# Patient Record
Sex: MALE | Race: WHITE | NOT HISPANIC OR LATINO | Employment: FULL TIME | URBAN - METROPOLITAN AREA
[De-identification: names, ages, dates, MRNs, and addresses within clinical notes are randomized per-mention and may not be internally consistent; named-entity substitution may affect disease eponyms.]

---

## 2017-10-09 ENCOUNTER — ALLSCRIPTS OFFICE VISIT (OUTPATIENT)
Dept: OTHER | Facility: OTHER | Age: 62
End: 2017-10-09

## 2017-10-10 NOTE — PROGRESS NOTES
Assessment  1  Swelling of right eyelid (374 82) (H02 843)    Plan  Swelling of right eyelid    · Erythromycin 5 MG/GM Ophthalmic Ointment; APPLY 1 cm ribbon IN affected EYE  3 TIMES A DAY   · Medrol 4 MG Oral Tablet Therapy Pack (MethylPREDNISolone); Take 6 tablets with  food on day 1, then 5 tablets on day 2, then 4 tablets on day  3 then 3 tablets on day 4 then 2 tablets on day 5 then 1 t    Discussion/Summary    Rto prn  Possible side effects of new medications were reviewed with the patient/guardian today  The treatment plan was reviewed with the patient/guardian  The patient/guardian understands and agrees with the treatment plan      Chief Complaint  patient presenting c/o right eye stye sl/cma      History of Present Illness  HPI: 65 y o m seen for R upper eyelid swelling after insect bite 5 days ago, not better - no vision changes, no therapy tried      Review of Systems    Constitutional: no fever or chills, feels well, no tiredness, no recent weight loss or weight gain  ENT: no complaints of earache, no loss of hearing, no nosebleeds or nasal discharge, no sore throat or hoarseness  Active Problems  1  Atrial fibrillation (427 31) (I48 91)   2  Encounter for CDL (commercial driving license) exam (A37 7) (Z02 4)   3  Encounter for pre-employment health screening examination (V70 5) (Z02 1)    Social History   · Never A Smoker    Current Meds   1  Aspirin 325 MG Oral Tablet; 1 Every Day; Therapy: 82TQG0076 to  Requested for: 33YEK0024 Recorded   2  Verapamil HCl  MG Oral Tablet Extended Release; TAKE 1 TABLET DAILY AS   DIRECTED; Therapy: 76HCM1697 to (Renew:[id number])  Requested for: 13Jun2017; Last   Rx:12Jun2017; Status: ACTIVE - Transmit to [facility] Verification Ordered    The medication list was reviewed and updated today  Allergies  1   No Known Drug Allergies    Vitals   Recorded: 99SWF9923 01:19PM   Temperature 97 4 F   Heart Rate 80   Respiration 16   Systolic 888 Diastolic 78   Height 6 ft    Weight 285 lb    BMI Calculated 38 65   BSA Calculated 2 48     Physical Exam    Constitutional   General appearance: No acute distress, well appearing and well nourished  morbidly obese  Eyes   Conjunctiva and lids: Abnormal   Conjunctiva Findings: normal in both eyes  Eye Lids: right upper eyelid swelling  Pupils and irises: Equal, round and reactive to light  Skin   Skin and subcutaneous tissue: Normal without rashes or lesions           Signatures   Electronically signed by : EVARISTO Cadena ; Oct  9 2017  1:46PM EST                       (Author)

## 2017-11-27 ENCOUNTER — ALLSCRIPTS OFFICE VISIT (OUTPATIENT)
Dept: OTHER | Facility: OTHER | Age: 62
End: 2017-11-27

## 2017-11-28 NOTE — PROGRESS NOTES
Assessment    1  Acute upper respiratory infection (465 9) (J06 9)    Discussion/Summary    Viral URIMucinexprn  The treatment plan was reviewed with the patient/guardian  The patient/guardian understands and agrees with the treatment plan      Chief Complaint  Patient presents for c/o sore throat and cough  nil/lpn      History of Present Illness  HPI: 65 y o m seen for cough, sore throat, hoarseness of voice x 3 days, better today, no fever, no therapy tried      Review of Systems   Constitutional: no fever or chills, feels well, no tiredness, no recent weight loss or weight gain  ENT: sore throat-- and-- hoarseness, but-- no earache-- and-- no nasal discharge  Respiratory: cough, but-- no shortness of breath-- and-- no wheezing  Gastrointestinal: no complaints of abdominal pain, no constipation, no nausea or vomiting, no diarrhea or bloody stools  Active Problems  1  Atrial fibrillation (427 31) (I48 91)   2  Encounter for CDL (commercial driving license) exam (U74 0) (Z02 4)   3  Encounter for pre-employment health screening examination (V70 5) (Z02 1)    Social History     · Never A Smoker    Current Meds   1  Aspirin 325 MG Oral Tablet; 1 Every Day; Therapy: 74DXI8191 to  Requested for: 10OSF3689 Recorded   2  Verapamil HCl  MG Oral Tablet Extended Release; TAKE 1 TABLET DAILY AS DIRECTED; Therapy: 71UEQ2760 to (Renew:[id number])  Requested for: 13Jun2017; Last Rx:12Jun2017; Status: ACTIVE - Transmit to [facility] Verification Ordered    The medication list was reviewed and updated today  Allergies  1  No Known Drug Allergies    Vitals   Recorded: 07XJZ7678 08:35AM   Temperature 97 2 F   Heart Rate 84   Respiration Quality Normal   Respiration 16   Systolic 206   Diastolic 80   Height 6 ft    Weight 287 lb    BMI Calculated 38 92   BSA Calculated 2 48       Physical Exam   Constitutional  General appearance: No acute distress, well appearing and well nourished  obese    Ears, Nose, Mouth, and Throat  Otoscopic examination: Tympanic membrance translucent with normal light reflex  Canals patent without erythema  Oropharynx: Normal with no erythema, edema, exudate or lesions  Pulmonary  Respiratory effort: No increased work of breathing or signs of respiratory distress  Auscultation of lungs: Clear to auscultation, equal breath sounds bilaterally, no wheezes, no rales, no rhonci           Signatures   Electronically signed by : EVARISTO Fagan ; Nov 27 2017 10:14AM EST                       (Author)

## 2018-01-10 ENCOUNTER — ALLSCRIPTS OFFICE VISIT (OUTPATIENT)
Dept: OTHER | Facility: OTHER | Age: 63
End: 2018-01-10

## 2018-01-10 DIAGNOSIS — R51.9 HEADACHE: ICD-10-CM

## 2018-01-12 VITALS
HEART RATE: 84 BPM | SYSTOLIC BLOOD PRESSURE: 130 MMHG | TEMPERATURE: 97.2 F | WEIGHT: 287 LBS | BODY MASS INDEX: 38.87 KG/M2 | HEIGHT: 72 IN | RESPIRATION RATE: 16 BRPM | DIASTOLIC BLOOD PRESSURE: 80 MMHG

## 2018-01-12 NOTE — PROGRESS NOTES
Assessment   1  Frequent headaches (784 0) (R51)    Plan   Frequent headaches    · * CT HEAD WO CONTRAST; Status:Need Information - Financial Authorization; Requested [id number]; Discussion/Summary      Rto prn  The treatment plan was reviewed with the patient/guardian  The patient/guardian understands and agrees with the treatment plan      Chief Complaint   Patient presents for c o headaches secondary to toothaches or sinus  Patient has been using his wife's nasonex  nil/lpn      History of Present Illness   HPI: 65 y o m seen for HAs starting in R sided occipital are and moving up to R temple daily for 2 m - contributed to toothache ( started as the same time) - did not have dental insurance and postponed dental visit until 2 wks ago - had dental work done - toothache resolved, HAs cont - dentist advised pt to change sleeping position - pt increased head of the bed last 2 days and HAs resolved      Review of Systems        Constitutional: no fever or chills, feels well, no tiredness, no recent weight loss or weight gain  ENT: no complaints of earache, no loss of hearing, no nosebleeds or nasal discharge, no sore throat or hoarseness  Respiratory: no complaints of shortness of breath, no wheezing or cough, no dyspnea on exertion, no orthopnea or PND  Active Problems   1  Atrial fibrillation (427 31) (I48 91)   2  Encounter for CDL (commercial driving license) exam (C38 1) (Z02 4)   3  Encounter for pre-employment health screening examination (V70 5) (Z02 1)    Social History    · Never A Smoker    Current Meds    1  Aspirin 325 MG Oral Tablet; 1 Every Day; Therapy: 46FHJ5901 to  Requested for: 59CPV0985 Recorded   2  Verapamil HCl  MG Oral Tablet Extended Release; TAKE 1 TABLET DAILY AS     DIRECTED; Therapy: 54RJC6056 to (Noemi Juárez)  Requested for: 05MCQ0192; Last     Rx:27Xfz0206 Ordered     The medication list was reviewed and updated today  Allergies   1   No Known Drug Allergies    Vitals    Recorded: 94UTV6714 08:27AM   Temperature 97 1 F   Heart Rate 88   Respiration Quality Normal   Respiration 16   Systolic 359   Diastolic 80   Height 6 ft    Weight 288 lb    BMI Calculated 39 06   BSA Calculated 2 49     Physical Exam        Constitutional      General appearance: No acute distress, well appearing and well nourished  morbidly obese  Eyes      Conjunctiva and lids: No swelling, erythema, or discharge  Pupils and irises: Equal, round and reactive to light  Musculoskeletal      Gait and station: Normal        Neurologic      Cranial nerves: Cranial nerves 2-12 intact            Signatures    Electronically signed by : EVARISTO Payne ; Boom 10 2018 10:43AM EST                       (Author)

## 2018-01-14 VITALS
BODY MASS INDEX: 38.6 KG/M2 | DIASTOLIC BLOOD PRESSURE: 78 MMHG | WEIGHT: 285 LBS | HEIGHT: 72 IN | HEART RATE: 80 BPM | RESPIRATION RATE: 16 BRPM | SYSTOLIC BLOOD PRESSURE: 130 MMHG | TEMPERATURE: 97.4 F

## 2018-01-22 ENCOUNTER — HOSPITAL ENCOUNTER (OUTPATIENT)
Dept: RADIOLOGY | Facility: HOSPITAL | Age: 63
Discharge: HOME | End: 2018-01-22
Attending: FAMILY MEDICINE
Payer: COMMERCIAL

## 2018-01-22 VITALS
TEMPERATURE: 97.1 F | DIASTOLIC BLOOD PRESSURE: 80 MMHG | WEIGHT: 288 LBS | BODY MASS INDEX: 39.01 KG/M2 | HEART RATE: 88 BPM | HEIGHT: 72 IN | RESPIRATION RATE: 16 BRPM | SYSTOLIC BLOOD PRESSURE: 135 MMHG

## 2018-01-22 DIAGNOSIS — R51.9 HEADACHE: ICD-10-CM

## 2018-01-22 PROCEDURE — 70450 CT HEAD/BRAIN W/O DYE: CPT

## 2018-01-23 ENCOUNTER — GENERIC CONVERSION - ENCOUNTER (OUTPATIENT)
Dept: OTHER | Facility: OTHER | Age: 63
End: 2018-01-23

## 2018-01-24 NOTE — RESULT NOTES
Verified Results  * CT HEAD WO CONTRAST 38NLC6459 10:23AM Stone Sommers Order Number: [id number]   Performing Comments: NO AUTHORIZATION REQUIRED WITH PLAN PLEASE SCHEDULE Meme Hash   - Patient Instructions: To schedule this appointment, please contact Central Scheduling at [phone]  Test Name Result Flag Reference   CT HEAD WO CONTRAST (Report)     CT BRAIN - WITHOUT CONTRAST     INDICATION: Headache     COMPARISON: None  TECHNIQUE: CT examination of the brain was performed  In addition to axial images, coronal reformatted images were created and submitted for interpretation  Radiation dose length product (DLP) for this visit: 1237 05 mGy-cm   This examination, like all CT scans performed in the [facility], was performed utilizing techniques to minimize radiation dose exposure, including the use of    iterative reconstruction and automated exposure control  IMAGE QUALITY: Diagnostic  FINDINGS:      PARENCHYMA: No intracranial mass, mass effect or midline shift  No CT signs of acute infarction  There is no parenchymal hemorrhage  VENTRICLES AND EXTRA-AXIAL SPACES: Normal for patient's age  VISUALIZED ORBITS AND PARANASAL SINUSES: Retention cyst or polyp left maxillary base  CALVARIUM AND EXTRACRANIAL SOFT TISSUES: Degenerative change right temporomandibular joint  IMPRESSION:     No acute intracranial abnormality  Workstation performed: [id number]     Signed by:    Nancy Noel MD   1/23/18

## 2018-01-31 ENCOUNTER — HOSPITAL ENCOUNTER (EMERGENCY)
Facility: HOSPITAL | Age: 63
Discharge: HOME | End: 2018-01-31
Attending: EMERGENCY MEDICINE | Admitting: EMERGENCY MEDICINE
Payer: COMMERCIAL

## 2018-01-31 ENCOUNTER — APPOINTMENT (EMERGENCY)
Dept: RADIOLOGY | Facility: HOSPITAL | Age: 63
End: 2018-01-31
Payer: COMMERCIAL

## 2018-01-31 VITALS
WEIGHT: 290 LBS | RESPIRATION RATE: 18 BRPM | OXYGEN SATURATION: 99 % | DIASTOLIC BLOOD PRESSURE: 75 MMHG | TEMPERATURE: 96.9 F | BODY MASS INDEX: 39.33 KG/M2 | HEART RATE: 82 BPM | SYSTOLIC BLOOD PRESSURE: 128 MMHG

## 2018-01-31 DIAGNOSIS — J32.0 RIGHT MAXILLARY SINUSITIS: ICD-10-CM

## 2018-01-31 DIAGNOSIS — R51.9 HEADACHE: Primary | ICD-10-CM

## 2018-01-31 LAB
ALBUMIN SERPL BCP-MCNC: 3.5 G/DL (ref 3.5–5)
ALP SERPL-CCNC: 65 U/L (ref 46–116)
ALT SERPL W P-5'-P-CCNC: 37 U/L (ref 12–78)
ANION GAP SERPL CALCULATED.3IONS-SCNC: 7 MMOL/L (ref 4–13)
AST SERPL W P-5'-P-CCNC: 19 U/L (ref 5–45)
BASOPHILS # BLD AUTO: 0 THOUSANDS/ΜL (ref 0–0.1)
BASOPHILS NFR BLD AUTO: 0 % (ref 0–1)
BILIRUB SERPL-MCNC: 0.4 MG/DL (ref 0.2–1)
BUN SERPL-MCNC: 18 MG/DL (ref 5–25)
CALCIUM SERPL-MCNC: 8.5 MG/DL (ref 8.3–10.1)
CHLORIDE SERPL-SCNC: 106 MMOL/L (ref 100–108)
CO2 SERPL-SCNC: 28 MMOL/L (ref 21–32)
CREAT SERPL-MCNC: 0.76 MG/DL (ref 0.6–1.3)
EOSINOPHIL # BLD AUTO: 0.2 THOUSAND/ΜL (ref 0–0.61)
EOSINOPHIL NFR BLD AUTO: 2 % (ref 0–6)
ERYTHROCYTE [DISTWIDTH] IN BLOOD BY AUTOMATED COUNT: 12.9 % (ref 11.6–15.1)
ERYTHROCYTE [SEDIMENTATION RATE] IN BLOOD: 14 MM/HOUR (ref 2–10)
GFR SERPL CREATININE-BSD FRML MDRD: 98 ML/MIN/1.73SQ M
GLUCOSE SERPL-MCNC: 101 MG/DL (ref 65–140)
HCT VFR BLD AUTO: 47 % (ref 42–52)
HGB BLD-MCNC: 15.9 G/DL (ref 14–18)
LYMPHOCYTES # BLD AUTO: 2.7 THOUSANDS/ΜL (ref 0.6–4.47)
LYMPHOCYTES NFR BLD AUTO: 36 % (ref 14–44)
MCH RBC QN AUTO: 29.4 PG (ref 27–31)
MCHC RBC AUTO-ENTMCNC: 33.8 G/DL (ref 31.4–37.4)
MCV RBC AUTO: 87 FL (ref 82–98)
MONOCYTES # BLD AUTO: 0.7 THOUSAND/ΜL (ref 0.17–1.22)
MONOCYTES NFR BLD AUTO: 9 % (ref 4–12)
NEUTROPHILS # BLD AUTO: 3.9 THOUSANDS/ΜL (ref 1.85–7.62)
NEUTS SEG NFR BLD AUTO: 52 % (ref 43–75)
NRBC BLD AUTO-RTO: 0 /100 WBCS
PLATELET # BLD AUTO: 201 THOUSANDS/UL (ref 130–400)
PMV BLD AUTO: 8.8 FL (ref 8.9–12.7)
POTASSIUM SERPL-SCNC: 4.2 MMOL/L (ref 3.5–5.3)
PROT SERPL-MCNC: 7.5 G/DL (ref 6.4–8.2)
RBC # BLD AUTO: 5.41 MILLION/UL (ref 4.7–6.1)
SODIUM SERPL-SCNC: 141 MMOL/L (ref 136–145)
WBC # BLD AUTO: 7.5 THOUSAND/UL (ref 4.8–10.8)

## 2018-01-31 PROCEDURE — 80053 COMPREHEN METABOLIC PANEL: CPT | Performed by: EMERGENCY MEDICINE

## 2018-01-31 PROCEDURE — 99284 EMERGENCY DEPT VISIT MOD MDM: CPT

## 2018-01-31 PROCEDURE — 85652 RBC SED RATE AUTOMATED: CPT | Performed by: EMERGENCY MEDICINE

## 2018-01-31 PROCEDURE — 85025 COMPLETE CBC W/AUTO DIFF WBC: CPT | Performed by: EMERGENCY MEDICINE

## 2018-01-31 PROCEDURE — 36415 COLL VENOUS BLD VENIPUNCTURE: CPT | Performed by: EMERGENCY MEDICINE

## 2018-01-31 PROCEDURE — 70496 CT ANGIOGRAPHY HEAD: CPT

## 2018-01-31 PROCEDURE — 70498 CT ANGIOGRAPHY NECK: CPT

## 2018-01-31 RX ORDER — ASPIRIN 325 MG
TABLET ORAL DAILY
COMMUNITY
Start: 2009-03-18 | End: 2021-01-07 | Stop reason: HOSPADM

## 2018-01-31 RX ORDER — AMOXICILLIN AND CLAVULANATE POTASSIUM 875; 125 MG/1; MG/1
1 TABLET, FILM COATED ORAL ONCE
Status: COMPLETED | OUTPATIENT
Start: 2018-01-31 | End: 2018-01-31

## 2018-01-31 RX ORDER — AMOXICILLIN AND CLAVULANATE POTASSIUM 875; 125 MG/1; MG/1
1 TABLET, FILM COATED ORAL EVERY 12 HOURS SCHEDULED
Qty: 20 TABLET | Refills: 0 | Status: SHIPPED | OUTPATIENT
Start: 2018-01-31 | End: 2018-02-10

## 2018-01-31 RX ORDER — METHYLPREDNISOLONE 4 MG/1
TABLET ORAL
Qty: 21 TABLET | Refills: 0 | Status: SHIPPED | OUTPATIENT
Start: 2018-01-31 | End: 2018-06-21 | Stop reason: ALTCHOICE

## 2018-01-31 RX ORDER — BUTALBITAL, ACETAMINOPHEN AND CAFFEINE 50; 325; 40 MG/1; MG/1; MG/1
1 TABLET ORAL EVERY 6 HOURS PRN
Qty: 15 TABLET | Refills: 0 | Status: SHIPPED | OUTPATIENT
Start: 2018-01-31 | End: 2018-06-21

## 2018-01-31 RX ORDER — VERAPAMIL HYDROCHLORIDE 240 MG/1
1 TABLET, FILM COATED, EXTENDED RELEASE ORAL DAILY
COMMUNITY
Start: 2016-12-14 | End: 2018-06-21 | Stop reason: SDUPTHER

## 2018-01-31 RX ORDER — MOMETASONE FUROATE 50 UG/1
2 SPRAY, METERED NASAL DAILY
COMMUNITY
Start: 2012-10-17 | End: 2021-01-05 | Stop reason: CLARIF

## 2018-01-31 RX ORDER — BUTALBITAL, ACETAMINOPHEN AND CAFFEINE 50; 325; 40 MG/1; MG/1; MG/1
1 TABLET ORAL ONCE
Status: COMPLETED | OUTPATIENT
Start: 2018-01-31 | End: 2018-01-31

## 2018-01-31 RX ADMIN — IOHEXOL 85 ML: 350 INJECTION, SOLUTION INTRAVENOUS at 06:12

## 2018-01-31 RX ADMIN — AMOXICILLIN AND CLAVULANATE POTASSIUM 1 TABLET: 875; 125 TABLET, FILM COATED ORAL at 07:29

## 2018-01-31 RX ADMIN — BUTALBITAL, ACETAMINOPHEN, AND CAFFEINE 1 TABLET: 50; 325; 40 TABLET ORAL at 04:29

## 2018-01-31 NOTE — DISCHARGE INSTRUCTIONS

## 2018-02-12 ENCOUNTER — TELEPHONE (OUTPATIENT)
Dept: FAMILY MEDICINE CLINIC | Facility: CLINIC | Age: 63
End: 2018-02-12

## 2018-02-12 NOTE — TELEPHONE ENCOUNTER
Patient called stating that he had a CT without contrast on 1/22/18  The CT was normal   His symptoms got worse so her went to the ER where they performed a CT with contrast   He states that they found an infection in his sinuses  He is upset because he states the CT with contrast should've been ordered in the first place  He is going to appeal this with his insurance company    I spoke to Dr Albino Harrington who states that his symptoms did not warrant a CT with contrast

## 2018-06-20 ENCOUNTER — TELEPHONE (OUTPATIENT)
Dept: CARDIOLOGY CLINIC | Facility: CLINIC | Age: 63
End: 2018-06-20

## 2018-06-20 RX ORDER — VERAPAMIL HYDROCHLORIDE 240 MG/1
240 TABLET, FILM COATED, EXTENDED RELEASE ORAL DAILY
Qty: 30 TABLET | Refills: 5 | Status: CANCELLED | OUTPATIENT
Start: 2018-06-20

## 2018-06-20 NOTE — TELEPHONE ENCOUNTER
LM a message to call cardiology and set up a follow up appt in order to get the med refilled or contact pcp and ask them to refill the med

## 2018-06-20 NOTE — TELEPHONE ENCOUNTER
I see  [facility]  Please confirm this  If that is the case, we cannot refill this medication until the patient is seen in cardiology follow-up

## 2018-06-21 ENCOUNTER — OFFICE VISIT (OUTPATIENT)
Dept: CARDIOLOGY CLINIC | Facility: CLINIC | Age: 63
End: 2018-06-21
Payer: COMMERCIAL

## 2018-06-21 VITALS
HEIGHT: 73 IN | HEART RATE: 76 BPM | SYSTOLIC BLOOD PRESSURE: 120 MMHG | DIASTOLIC BLOOD PRESSURE: 80 MMHG | BODY MASS INDEX: 38.04 KG/M2 | OXYGEN SATURATION: 98 % | WEIGHT: 287 LBS

## 2018-06-21 DIAGNOSIS — I48.0 PAROXYSMAL ATRIAL FIBRILLATION (HCC): Primary | ICD-10-CM

## 2018-06-21 PROCEDURE — 93000 ELECTROCARDIOGRAM COMPLETE: CPT | Performed by: INTERNAL MEDICINE

## 2018-06-21 PROCEDURE — 99214 OFFICE O/P EST MOD 30 MIN: CPT | Performed by: INTERNAL MEDICINE

## 2018-06-21 RX ORDER — VERAPAMIL HYDROCHLORIDE 240 MG/1
240 TABLET, FILM COATED, EXTENDED RELEASE ORAL DAILY
Qty: 90 TABLET | Refills: 3 | Status: SHIPPED | OUTPATIENT
Start: 2018-06-21 | End: 2019-04-23 | Stop reason: SDUPTHER

## 2018-06-21 NOTE — PROGRESS NOTES
Cardiology Outpatient Consultation                                                           Gerardo Cochran  [id number]  1955      Appreciate consult by: Delia Morales MD    1  Paroxysmal atrial fibrillation (HCC)  POCT ECG    verapamil (CALAN-SR) 240 mg CR tablet       Discussion/Summary:  Persistent atrial fibrillation-rate controlled  Chads Vasc score of 1  Continue verapamil 240 mg daily  Continue aspirin 325 mg daily    Hypertension-controlled    Aortic sclerosis-benign physical examination    Morbid obesity/deconditioning-discussed weight loss    HPI:  29-year-old gentleman with a history of obesity, hypertension, persistent atrial fibrillation on longstanding rate control agent presents for follow-up  He reports his heart rates have been well controlled  He denies having dizziness or lightheadedness  He denies having chest heaviness  He denies having any syncopal episodes  He denies having any significant bleeding or bruising  Past Medical History:   Diagnosis Date    Atrial fibrillation (HCC)     Hypertension     Paroxysmal atrial fibrillation (HCC)     Valvular heart disease      Social History     Social History    Marital status: /Civil Union     Spouse name: N/A    Number of children: N/A    Years of education: N/A     Occupational History    Not on file       Social History Main Topics    Smoking status: Never Smoker    Smokeless tobacco: Never Used    Alcohol use Yes      Comment: occ - once week    Drug use: No    Sexual activity: Not on file     Other Topics Concern    Not on file     Social History Narrative    No narrative on file      Family History   Problem Relation Age of Onset    No Known Problems Mother     Stroke Father     Heart failure Brother         heart problems    Brain cancer Brother          0     Past Surgical History:   Procedure Laterality Date    HERNIA REPAIR      KNEE SURGERY Current Outpatient Prescriptions:     aspirin 325 mg tablet, Take by mouth daily, Disp: , Rfl:     mometasone (NASONEX) 50 mcg/act nasal spray, 2 puffs into each nostril daily, Disp: , Rfl:     verapamil (CALAN-SR) 240 mg CR tablet, Take 1 tablet (240 mg total) by mouth daily, Disp: 90 tablet, Rfl: 3  No Known Allergies  Vitals:    06/21/18 1545   BP: 120/80   BP Location: Left arm   Patient Position: Sitting   Cuff Size: Large   Pulse: 76   SpO2: 98%   Weight: 130 kg (287 lb)   Height: 6' 1" (1 854 m)       Review of Systems:  Review of Systems   Constitutional: Negative  HENT: Negative  Eyes: Negative  Respiratory: Negative  Cardiovascular: Negative  Gastrointestinal: Negative  Endocrine: Negative  Genitourinary: Negative  Musculoskeletal: Negative  Skin: Negative  Allergic/Immunologic: Negative  Neurological: Negative  Hematological: Negative  Psychiatric/Behavioral: Negative  Vitals:    06/21/18 1545   BP: 120/80   BP Location: Left arm   Patient Position: Sitting   Cuff Size: Large   Pulse: 76   SpO2: 98%   Weight: 130 kg (287 lb)   Height: 6' 1" (1 854 m)     Physical Exam:  Physical Exam   Constitutional: He is oriented to person, place, and time  No distress  HENT:   Head: Normocephalic and atraumatic  Right Ear: External ear normal    Left Ear: External ear normal    Eyes: Conjunctivae are normal  Pupils are equal, round, and reactive to light  Right eye exhibits no discharge  Left eye exhibits no discharge  No scleral icterus  Neck: Normal range of motion  Neck supple  No JVD present  No tracheal deviation present  No thyromegaly present  Cardiovascular: An irregular rhythm present  Exam reveals gallop  Exam reveals no friction rub  Murmur heard  Pulmonary/Chest: Effort normal and breath sounds normal  No stridor  No respiratory distress  He has no wheezes  He has no rales  He exhibits no tenderness  Abdominal: Soft   Bowel sounds are normal  He exhibits no distension and no mass  There is no tenderness  There is no rebound and no guarding  Musculoskeletal: Normal range of motion  He exhibits no edema, tenderness or deformity  Neurological: He is alert and oriented to person, place, and time  He has normal reflexes  No cranial nerve deficit  He exhibits normal muscle tone  Coordination normal    Skin: Skin is warm and dry  No rash noted  He is not diaphoretic  No erythema  No pallor  Psychiatric: He has a normal mood and affect  His behavior is normal  Judgment and thought content normal        Labs:     Lab Results   Component Value Date    WBC 7 50 01/31/2018    HGB 15 9 01/31/2018    HCT 47 0 01/31/2018    MCV 87 01/31/2018     01/31/2018     Lab Results   Component Value Date     01/31/2018    K 4 2 01/31/2018     01/31/2018    CO2 28 01/31/2018    ANIONGAP 7 01/31/2018    BUN 18 01/31/2018    CREATININE 0 76 01/31/2018    GLUCOSE 101 01/31/2018    CALCIUM 8 5 01/31/2018    AST 19 01/31/2018    ALT 37 01/31/2018    ALKPHOS 65 01/31/2018    PROT 7 5 01/31/2018    BILITOT 0 40 01/31/2018    EGFR 98 01/31/2018     No results found for: CHOL  No results found for: HDL  No results found for: LDLCALC  No results found for: TRIG  No results found for: HGBA1C  No results found for: TSH    Imaging & Testing   I have personally reviewed pertinent reports  EKG: Personally reviewed  Atrial fibrillation lafb no acute st/t wave changes    Cardiac testing:   No results found for this or any previous visit  Umu Jacome MD [facility] [phone]  Please call with any questions or suggestions    Counseling :  A description of the counseling:   Goals and Barriers:  Patient's ability to self care:  Medication side effect reviewed with patient in detail and all their questions answered  "This note has been constructed using a voice recognition system  Therefore there may be syntax, spelling, and/or grammatical errors   Please call if you have any questions  "

## 2019-04-23 DIAGNOSIS — I48.0 PAROXYSMAL ATRIAL FIBRILLATION (HCC): ICD-10-CM

## 2019-04-23 RX ORDER — VERAPAMIL HYDROCHLORIDE 240 MG/1
240 TABLET, FILM COATED, EXTENDED RELEASE ORAL DAILY
Qty: 90 TABLET | Refills: 3 | Status: SHIPPED | OUTPATIENT
Start: 2019-04-23 | End: 2020-04-23 | Stop reason: SDUPTHER

## 2020-04-20 ENCOUNTER — TELEPHONE (OUTPATIENT)
Dept: CARDIOLOGY CLINIC | Facility: CLINIC | Age: 65
End: 2020-04-20

## 2020-04-23 ENCOUNTER — TELEMEDICINE (OUTPATIENT)
Dept: CARDIOLOGY CLINIC | Facility: CLINIC | Age: 65
End: 2020-04-23
Payer: COMMERCIAL

## 2020-04-23 VITALS — SYSTOLIC BLOOD PRESSURE: 120 MMHG | DIASTOLIC BLOOD PRESSURE: 70 MMHG | WEIGHT: 250 LBS | BODY MASS INDEX: 32.98 KG/M2

## 2020-04-23 DIAGNOSIS — I48.0 PAROXYSMAL ATRIAL FIBRILLATION (HCC): ICD-10-CM

## 2020-04-23 PROCEDURE — 99214 OFFICE O/P EST MOD 30 MIN: CPT | Performed by: INTERNAL MEDICINE

## 2020-04-23 RX ORDER — VERAPAMIL HYDROCHLORIDE 240 MG/1
240 TABLET, FILM COATED, EXTENDED RELEASE ORAL DAILY
Qty: 90 TABLET | Refills: 3 | Status: SHIPPED | OUTPATIENT
Start: 2020-04-23 | End: 2021-01-07 | Stop reason: HOSPADM

## 2020-09-24 ENCOUNTER — TELEPHONE (OUTPATIENT)
Dept: CARDIOLOGY CLINIC | Facility: CLINIC | Age: 65
End: 2020-09-24

## 2021-01-05 ENCOUNTER — HOSPITAL ENCOUNTER (OUTPATIENT)
Facility: HOSPITAL | Age: 66
Setting detail: OBSERVATION
Discharge: HOME | End: 2021-01-07
Attending: EMERGENCY MEDICINE | Admitting: INTERNAL MEDICINE
Payer: COMMERCIAL

## 2021-01-05 ENCOUNTER — APPOINTMENT (EMERGENCY)
Dept: RADIOLOGY | Facility: HOSPITAL | Age: 66
End: 2021-01-05
Payer: COMMERCIAL

## 2021-01-05 DIAGNOSIS — R29.90 STROKE-LIKE SYMPTOMS: ICD-10-CM

## 2021-01-05 DIAGNOSIS — I63.9 ACUTE ISCHEMIC STROKE (HCC): ICD-10-CM

## 2021-01-05 DIAGNOSIS — G45.9 TIA (TRANSIENT ISCHEMIC ATTACK): ICD-10-CM

## 2021-01-05 DIAGNOSIS — I48.91 ATRIAL FIBRILLATION, UNSPECIFIED TYPE (HCC): ICD-10-CM

## 2021-01-05 DIAGNOSIS — R29.810 FACIAL DROOP: Primary | ICD-10-CM

## 2021-01-05 LAB
ALBUMIN SERPL BCP-MCNC: 3.4 G/DL (ref 3.5–5)
ALP SERPL-CCNC: 54 U/L (ref 46–116)
ALT SERPL W P-5'-P-CCNC: 58 U/L (ref 12–78)
ANION GAP SERPL CALCULATED.3IONS-SCNC: 7 MMOL/L (ref 4–13)
APTT PPP: 28 SECONDS (ref 23–37)
AST SERPL W P-5'-P-CCNC: 33 U/L (ref 5–45)
BACTERIA UR QL AUTO: NORMAL /HPF
BASOPHILS # BLD AUTO: 0.04 THOUSANDS/ΜL (ref 0–0.1)
BASOPHILS NFR BLD AUTO: 1 % (ref 0–1)
BILIRUB SERPL-MCNC: 0.2 MG/DL (ref 0.2–1)
BILIRUB UR QL STRIP: NEGATIVE
BUN SERPL-MCNC: 21 MG/DL (ref 5–25)
CALCIUM ALBUM COR SERPL-MCNC: 8.9 MG/DL (ref 8.3–10.1)
CALCIUM SERPL-MCNC: 8.4 MG/DL (ref 8.3–10.1)
CHLORIDE SERPL-SCNC: 100 MMOL/L (ref 100–108)
CLARITY UR: CLEAR
CO2 SERPL-SCNC: 28 MMOL/L (ref 21–32)
COLOR UR: COLORLESS
CREAT SERPL-MCNC: 0.81 MG/DL (ref 0.6–1.3)
EOSINOPHIL # BLD AUTO: 0.26 THOUSAND/ΜL (ref 0–0.61)
EOSINOPHIL NFR BLD AUTO: 3 % (ref 0–6)
ERYTHROCYTE [DISTWIDTH] IN BLOOD BY AUTOMATED COUNT: 13.2 % (ref 11.6–15.1)
GFR SERPL CREATININE-BSD FRML MDRD: 94 ML/MIN/1.73SQ M
GLUCOSE SERPL-MCNC: 113 MG/DL (ref 65–140)
GLUCOSE SERPL-MCNC: 96 MG/DL (ref 65–140)
GLUCOSE UR STRIP-MCNC: NEGATIVE MG/DL
HCT VFR BLD AUTO: 45.2 % (ref 36.5–49.3)
HGB BLD-MCNC: 15.5 G/DL (ref 12–17)
HGB UR QL STRIP.AUTO: ABNORMAL
INR PPP: 1.06 (ref 0.84–1.19)
KETONES UR STRIP-MCNC: NEGATIVE MG/DL
LEUKOCYTE ESTERASE UR QL STRIP: NEGATIVE
LYMPHOCYTES # BLD AUTO: 2.46 THOUSANDS/ΜL (ref 0.6–4.47)
LYMPHOCYTES NFR BLD AUTO: 31 % (ref 14–44)
MCH RBC QN AUTO: 30.7 PG (ref 26.8–34.3)
MCHC RBC AUTO-ENTMCNC: 34.3 G/DL (ref 31.4–37.4)
MCV RBC AUTO: 90 FL (ref 82–98)
MONOCYTES # BLD AUTO: 0.71 THOUSAND/ΜL (ref 0.17–1.22)
MONOCYTES NFR BLD AUTO: 9 % (ref 4–12)
NEUTROPHILS # BLD AUTO: 4.42 THOUSANDS/ΜL (ref 1.85–7.62)
NEUTS SEG NFR BLD AUTO: 56 % (ref 43–75)
NITRITE UR QL STRIP: NEGATIVE
NON-SQ EPI CELLS URNS QL MICRO: NORMAL /HPF
PH UR STRIP.AUTO: 6 [PH]
PLATELET # BLD AUTO: 182 THOUSANDS/UL (ref 149–390)
PMV BLD AUTO: 10.6 FL (ref 8.9–12.7)
POTASSIUM SERPL-SCNC: 4.1 MMOL/L (ref 3.5–5.3)
PROT SERPL-MCNC: 7.3 G/DL (ref 6.4–8.2)
PROT UR STRIP-MCNC: NEGATIVE MG/DL
PROTHROMBIN TIME: 13.8 SECONDS (ref 11.6–14.5)
RBC # BLD AUTO: 5.05 MILLION/UL (ref 3.88–5.62)
RBC #/AREA URNS AUTO: NORMAL /HPF
SODIUM SERPL-SCNC: 135 MMOL/L (ref 136–145)
SP GR UR STRIP.AUTO: <=1.005 (ref 1–1.03)
TROPONIN I SERPL-MCNC: <0.02 NG/ML
UROBILINOGEN UR QL STRIP.AUTO: 0.2 E.U./DL
WBC # BLD AUTO: 7.89 THOUSAND/UL (ref 4.31–10.16)
WBC #/AREA URNS AUTO: NORMAL /HPF

## 2021-01-05 PROCEDURE — 82948 REAGENT STRIP/BLOOD GLUCOSE: CPT

## 2021-01-05 PROCEDURE — 80053 COMPREHEN METABOLIC PANEL: CPT | Performed by: EMERGENCY MEDICINE

## 2021-01-05 PROCEDURE — 96360 HYDRATION IV INFUSION INIT: CPT

## 2021-01-05 PROCEDURE — 81001 URINALYSIS AUTO W/SCOPE: CPT | Performed by: EMERGENCY MEDICINE

## 2021-01-05 PROCEDURE — G1004 CDSM NDSC: HCPCS

## 2021-01-05 PROCEDURE — 93005 ELECTROCARDIOGRAM TRACING: CPT

## 2021-01-05 PROCEDURE — 70498 CT ANGIOGRAPHY NECK: CPT

## 2021-01-05 PROCEDURE — 99285 EMERGENCY DEPT VISIT HI MDM: CPT

## 2021-01-05 PROCEDURE — 84484 ASSAY OF TROPONIN QUANT: CPT | Performed by: EMERGENCY MEDICINE

## 2021-01-05 PROCEDURE — 36415 COLL VENOUS BLD VENIPUNCTURE: CPT | Performed by: EMERGENCY MEDICINE

## 2021-01-05 PROCEDURE — 99285 EMERGENCY DEPT VISIT HI MDM: CPT | Performed by: EMERGENCY MEDICINE

## 2021-01-05 PROCEDURE — 99220 PR INITIAL OBSERVATION CARE/DAY 70 MINUTES: CPT | Performed by: NURSE PRACTITIONER

## 2021-01-05 PROCEDURE — 70496 CT ANGIOGRAPHY HEAD: CPT

## 2021-01-05 PROCEDURE — 85730 THROMBOPLASTIN TIME PARTIAL: CPT | Performed by: EMERGENCY MEDICINE

## 2021-01-05 PROCEDURE — 85610 PROTHROMBIN TIME: CPT | Performed by: EMERGENCY MEDICINE

## 2021-01-05 PROCEDURE — 85025 COMPLETE CBC W/AUTO DIFF WBC: CPT | Performed by: EMERGENCY MEDICINE

## 2021-01-05 RX ORDER — CLOPIDOGREL BISULFATE 75 MG/1
300 TABLET ORAL ONCE
Status: COMPLETED | OUTPATIENT
Start: 2021-01-05 | End: 2021-01-05

## 2021-01-05 RX ORDER — ATORVASTATIN CALCIUM 40 MG/1
40 TABLET, FILM COATED ORAL
Status: DISCONTINUED | OUTPATIENT
Start: 2021-01-05 | End: 2021-01-07

## 2021-01-05 RX ORDER — ASPIRIN 81 MG/1
81 TABLET, CHEWABLE ORAL DAILY
Status: DISCONTINUED | OUTPATIENT
Start: 2021-01-06 | End: 2021-01-07 | Stop reason: HOSPADM

## 2021-01-05 RX ORDER — CLOPIDOGREL BISULFATE 75 MG/1
75 TABLET ORAL DAILY
Status: DISCONTINUED | OUTPATIENT
Start: 2021-01-06 | End: 2021-01-07

## 2021-01-05 RX ORDER — ASPIRIN 325 MG
325 TABLET ORAL ONCE
Status: COMPLETED | OUTPATIENT
Start: 2021-01-05 | End: 2021-01-05

## 2021-01-05 RX ADMIN — SODIUM CHLORIDE 1000 ML: 0.9 INJECTION, SOLUTION INTRAVENOUS at 18:01

## 2021-01-05 RX ADMIN — ASPIRIN 325 MG: 325 TABLET, FILM COATED ORAL at 18:53

## 2021-01-05 RX ADMIN — ATORVASTATIN CALCIUM 40 MG: 40 TABLET, FILM COATED ORAL at 18:53

## 2021-01-05 RX ADMIN — CLOPIDOGREL BISULFATE 300 MG: 75 TABLET ORAL at 18:53

## 2021-01-05 RX ADMIN — IOHEXOL 85 ML: 350 INJECTION, SOLUTION INTRAVENOUS at 17:49

## 2021-01-05 NOTE — LETTER
[address] [facility] [address]  Dept: [phone]    January 7, 2021     Patient: Katya Wheatley   YOB: 1955   Date of Visit: 1/5/2021       To Whom it May Concern:    Dayo Gisell is under my professional care  He was seen in the hospital from 1/5/2021   to 01/07/21  He may return to work on 1/11/21 without limitations  If you have any questions or concerns, please don't hesitate to call           Sincerely,          Renu Acosta MD

## 2021-01-05 NOTE — QUICK NOTE
Stroke alert activated by Dr Nickolas Tsang in the [facility] ED via the [facility]  at [phone]  Patient is a 60year old man with afib, not on AC (coumadin previously DC'd about 4 years ago), who presents with acute onset of L facial weakness an slurred speech  Time of onset was about 1645  Patient was at home with his wife  She noticed that he had new onset L facial weakness and dysarthria at that time  The symptoms improved slightly by the time he was in the ED, and during the evaluation, they resolved nearly completely  On exam:   /79  Cardiac rhythm is irregularly irregular, rate is 120-130  Awake, alert, following commands, normal orientation, naming, and fluency  Mild facial weakness on the L (improving on subsequent evaluations), and mild slurred speech  Full VF's, and normal eye movements  MOTOR: normal, no drift, 5/5 throughout  COORDINAITON: normal    GAIT: narrow based  Normal stride  No ataxia  NIHSS 0-1    CTH images reviewed: no hemorrhage  No loss of grey white differentiation  CTA H&N images reviewed: distal M2 cutoff on the R      Images were reviewed with Dr Jack Cobb in neuroradiology  AP: 60year old man with afib not on AC, presenting with L facial weakness and dysarthria, 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       Had an extensive discussion with the patient and his wife, Frank Gruber  We discussed     -the occlusion identified on his imaging  -the possibility and likelihood that deficits would return due to the occlusion     -the risks of TPA, including symptomatic hemorrhage, which had to be weighed against potential facial weakness and dysarthria, and   -the possibility that TPA would not be offered if symptoms returned, unless a stat MRI could be performed that showed low risk of hemorrhage  The patient and his wife expressed understanding of this complex situation, and they opted not to take on the risk of TPA        I discussed with Dr Priyank Rendon, and recommended a dual antiplatelet load with aspirin 325 and plavix 75, and high intensity statin, admission for stroke evaluation, and restarting anticoagulation if appropriate        Patient to have a formal neurology evaluation in AM

## 2021-01-05 NOTE — ED PROVIDER NOTES
History  Chief Complaint   Patient presents with    STROKE Alert     Pt was last seen well at 1630, arrived with L facial droop  Patient was last seen well 1 hour prior to arrival   Approximately 1/2 hour prior to arrival his wife noted facial droop on the left side associated with slurred speech  The patient was awake and alert and able to walk down the steps by himself  She quickly brought him to the hospital   By the time of arrival wife states that the speech has improved tenderness remarkably less slurred  Facial droop is about the same  Patient was examined in a wheelchair in sent immediately to CT scan  Of note, the patient has had a history of AFib and was maintained on Coumadin for years  He was taken off Coumadin for 5 years ago after he was found to have converted to sinus rhythm  Patient is only on aspirin now          Prior to Admission Medications   Prescriptions Last Dose Informant Patient Reported? Taking?   aspirin 325 mg tablet  Self Yes No   Sig: Take by mouth daily   mometasone (NASONEX) 50 mcg/act nasal spray  Self Yes No   Si puffs into each nostril daily   verapamil (CALAN-SR) 240 mg CR tablet   No No   Sig: Take 1 tablet (240 mg total) by mouth daily      Facility-Administered Medications: None       Past Medical History:   Diagnosis Date    Atrial fibrillation (HCC)     Hypertension     Paroxysmal atrial fibrillation (HCC)     Valvular heart disease        Past Surgical History:   Procedure Laterality Date    HERNIA REPAIR      KNEE SURGERY         Family History   Problem Relation Age of Onset    No Known Problems Mother     Stroke Father     Heart failure Brother         heart problems    Brain cancer Brother          0     I have reviewed and agree with the history as documented      E-Cigarette/Vaping     E-Cigarette/Vaping Substances     Social History     Tobacco Use    Smoking status: Never Smoker    Smokeless tobacco: Never Used   Substance Use Topics    Alcohol use: Not Currently    Drug use: No       Review of Systems   Constitutional: Negative for chills and fever  HENT: Negative for congestion and sore throat  Eyes: Negative for visual disturbance  Respiratory: Negative for cough and shortness of breath  Cardiovascular: Negative for chest pain  Gastrointestinal: Negative for abdominal pain  Genitourinary: Negative for dysuria  Musculoskeletal: Negative for back pain  Neurological: Positive for facial asymmetry and speech difficulty  Negative for tremors, syncope, weakness, light-headedness, numbness and headaches  Hematological: Does not bruise/bleed easily  Psychiatric/Behavioral: Negative for confusion  All other systems reviewed and are negative  Physical Exam  Physical Exam  Vitals signs and nursing note reviewed  Constitutional:       Appearance: Normal appearance  HENT:      Head: Normocephalic  Right Ear: External ear normal       Left Ear: External ear normal       Nose: Nose normal       Mouth/Throat:      Pharynx: Oropharynx is clear  Eyes:      Conjunctiva/sclera: Conjunctivae normal    Neck:      Musculoskeletal: Normal range of motion and neck supple  Cardiovascular:      Rate and Rhythm: Normal rate  Rhythm irregular  Pulses: Normal pulses  Pulmonary:      Effort: Pulmonary effort is normal    Abdominal:      General: Bowel sounds are normal       Palpations: Abdomen is soft  Musculoskeletal: Normal range of motion  Skin:     General: Skin is warm and dry  Capillary Refill: Capillary refill takes less than 2 seconds  Neurological:      General: No focal deficit present  Mental Status: He is alert and oriented to person, place, and time     Psychiatric:         Mood and Affect: Mood normal          Behavior: Behavior normal          Vital Signs  ED Triage Vitals   Temperature Pulse Respirations Blood Pressure SpO2   01/05/21 1745 01/05/21 1745 01/05/21 1745 01/05/21 1745 01/05/21 1745   97 6 °F (36 4 °C) 89 16 123/74 94 %      Temp Source Heart Rate Source Patient Position - Orthostatic VS BP Location FiO2 (%)   01/05/21 1745 01/05/21 1745 01/05/21 1745 01/05/21 1745 --   Tympanic Monitor Sitting Right arm       Pain Score       01/05/21 1845       4           Vitals:    01/05/21 1800 01/05/21 1815 01/05/21 1830 01/05/21 1845   BP: 145/79 137/81 135/83 152/81   Pulse: 96 86 88 92   Patient Position - Orthostatic VS: Lying Sitting Sitting Sitting         Visual Acuity  Visual Acuity      Most Recent Value   L Pupil Size (mm)  3   R Pupil Size (mm)  3          ED Medications  Medications   atorvastatin (LIPITOR) tablet 40 mg (40 mg Oral Given 1/5/21 1853)   sodium chloride 0 9 % bolus 1,000 mL (1,000 mL Intravenous New Bag 1/5/21 1801)   iohexol (OMNIPAQUE) 350 MG/ML injection (SINGLE-DOSE) 85 mL (85 mL Intravenous Given 1/5/21 1749)   aspirin tablet 325 mg (325 mg Oral Given 1/5/21 1853)   clopidogrel (PLAVIX) tablet 300 mg (300 mg Oral Given 1/5/21 1853)       Diagnostic Studies  Results Reviewed     Procedure Component Value Units Date/Time    Urine Microscopic [[id number]]  (Normal) Collected: 01/05/21 1817    Lab Status: Final result Specimen: Urine, Clean Catch Updated: 01/05/21 1836     RBC, UA 0-1 /hpf      WBC, UA None Seen /hpf      Epithelial Cells Occasional /hpf      Bacteria, UA Occasional /hpf     UA (URINE) with reflex to Scope [[id number]]  (Abnormal) Collected: 01/05/21 1817    Lab Status: Final result Specimen: Urine, Clean Catch Updated: 01/05/21 1826     Color, UA Colorless     Clarity, UA Clear     Specific Gravity, UA <=1 005     pH, UA 6 0     Leukocytes, UA Negative     Nitrite, UA Negative     Protein, UA Negative mg/dl      Glucose, UA Negative mg/dl      Ketones, UA Negative mg/dl      Urobilinogen, UA 0 2 E U /dl      Bilirubin, UA Negative     Blood, UA Trace-Intact    Troponin I [[id number]]  (Normal) Collected: 01/05/21 1758    Lab Status: Final result Specimen: Blood from Arm, Right Updated: 01/05/21 1824     Troponin I <0 02 ng/mL     Comprehensive metabolic panel [[id number]]  (Abnormal) Collected: 01/05/21 1758    Lab Status: Final result Specimen: Blood from Arm, Right Updated: 01/05/21 1821     Sodium 135 mmol/L      Potassium 4 1 mmol/L      Chloride 100 mmol/L      CO2 28 mmol/L      ANION GAP 7 mmol/L      BUN 21 mg/dL      Creatinine 0 81 mg/dL      Glucose 96 mg/dL      Calcium 8 4 mg/dL      Corrected Calcium 8 9 mg/dL      AST 33 U/L      ALT 58 U/L      Alkaline Phosphatase 54 U/L      Total Protein 7 3 g/dL      Albumin 3 4 g/dL      Total Bilirubin 0 20 mg/dL      eGFR 94 ml/min/1 73sq m     Narrative:      [place] guidelines for Chronic Kidney Disease (CKD):     Stage 1 with normal or high GFR (GFR > 90 mL/min/1 73 square meters)    Stage 2 Mild CKD (GFR = 60-89 mL/min/1 73 square meters)    Stage 3A Moderate CKD (GFR = 45-59 mL/min/1 73 square meters)    Stage 3B Moderate CKD (GFR = 30-44 mL/min/1 73 square meters)    Stage 4 Severe CKD (GFR = 15-29 mL/min/1 73 square meters)    Stage 5 End Stage CKD (GFR <15 mL/min/1 73 square meters)  Note: GFR calculation is accurate only with a steady state creatinine    Protime-INR [[id number]]  (Normal) Collected: 01/05/21 1758    Lab Status: Final result Specimen: Blood from Arm, Right Updated: 01/05/21 1816     Protime 13 8 seconds      INR 1 06    APTT [[id number]]  (Normal) Collected: 01/05/21 1758    Lab Status: Final result Specimen: Blood from Arm, Right Updated: 01/05/21 1816     PTT 28 seconds     CBC and differential [[id number]]  (Normal) Collected: 01/05/21 1758    Lab Status: Final result Specimen: Blood from Arm, Right Updated: 01/05/21 1805     WBC 7 89 Thousand/uL      RBC 5 05 Million/uL      Hemoglobin 15 5 g/dL      Hematocrit 45 2 %      MCV 90 fL      MCH 30 7 pg      MCHC 34 3 g/dL      RDW 13 2 %      MPV 10 6 fL      Platelets 377 Thousands/uL Neutrophils Relative 56 %      Lymphocytes Relative 31 %      Monocytes Relative 9 %      Eosinophils Relative 3 %      Basophils Relative 1 %      Neutrophils Absolute 4 42 Thousands/µL      Lymphocytes Absolute 2 46 Thousands/µL      Monocytes Absolute 0 71 Thousand/µL      Eosinophils Absolute 0 26 Thousand/µL      Basophils Absolute 0 04 Thousands/µL     Fingerstick Glucose (POCT) [[id number]]  (Normal) Collected: 01/05/21 1734    Lab Status: Final result Updated: 01/05/21 1741     POC Glucose 113 mg/dl                  CTA head and neck with and without contrast   Final Result by Rik Ortiz MD (01/05 1838)      Right distal M2/M3 branch occlusion  Cardiomegaly  I personally discussed this study with DR [facility] on 1/5/2021 at 6:11 PM                         Workstation performed: [id number]         CT stroke alert brain   Final Result by Rik Otriz MD (01/05 1802)      No acute intracranial abnormality  Microangiopathic changes        Findings were directly discussed with Yen Rosas on 1/5/2021 5:49 PM       Workstation performed: [id number]                    Procedures  ECG 12 Lead Documentation Only    Date/Time: 1/5/2021 6:05 PM  Performed by: Alan Pallas, MD  Authorized by: Alan Pallas, MD     Indications / Diagnosis:  Stroke alert, AFib  ECG reviewed by me, the ED Provider: yes    Patient location:  ED  Interpretation:     Interpretation: abnormal    Rate:     ECG rate:  98    ECG rate assessment: normal    Rhythm:     Rhythm: atrial fibrillation    Ectopy:     Ectopy: none    QRS:     QRS axis:  Left    QRS intervals:  Normal  Conduction:     Conduction: normal    ST segments:     ST segments:  Normal  T waves:     T waves: normal    Other findings:     Other findings: prolonged qTc interval               ED Course                 Stroke Assessment     Row Name 01/05/21 1955             NIH Stroke Scale    Interval  Baseline      Level of Consciousness (1a )  0      LOC Questions (1b )  0      LOC Commands (1c )  0      Best Gaze (2 )  0      Visual (3 )  0      Facial Palsy (4 )  1      Motor Arm, Left (5a )  0      Motor Arm, Right (5b )  0      Motor Leg, Left (6a )  0      Motor Leg, Right (6b )  0      Limb Ataxia (7 )  0      Sensory (8 )  0      Best Language (9 )  0      Dysarthria (10 )  1      Extinction and Inattention (11 ) (Formerly Neglect)  0      Total  2                                  MDM  Number of Diagnoses or Management Options  Facial droop:   Stroke-like symptoms:   Diagnosis management comments: After discussion with the patient his wife and neurologist, and noted that his exam is much improved after returning from [address], he has decided to go against tPA for now  Disposition  Final diagnoses:   Facial droop   Stroke-like symptoms     Time reflects when diagnosis was documented in both MDM as applicable and the Disposition within this note     Time User Action Codes Description Comment    1/5/2021  7:54 PM Sameul Seat A Add [R29 810] Facial droop     1/5/2021  7:54 PM Sameul Seat A Add [R29 90] Stroke-like symptoms       ED Disposition     ED Disposition Condition Date/Time Comment    Admit Stable Tue Jan 5, 2021  7:54 PM Case was discussed with hospitalist and the patient's admission status was agreed to be Ad mission Status: observation status to the service of Dr Cady Haskins   Follow-up Information    None         Patient's Medications   Discharge Prescriptions    No medications on file     No discharge procedures on file      PDMP Review     None          ED Provider  Electronically Signed by           Chad Chua MD  01/05/21 0358

## 2021-01-06 ENCOUNTER — APPOINTMENT (OUTPATIENT)
Dept: NON INVASIVE DIAGNOSTICS | Facility: HOSPITAL | Age: 66
End: 2021-01-06
Payer: COMMERCIAL

## 2021-01-06 LAB
ALBUMIN SERPL BCP-MCNC: 3.7 G/DL (ref 3.5–5)
ALP SERPL-CCNC: 63 U/L (ref 46–116)
ALT SERPL W P-5'-P-CCNC: 69 U/L (ref 12–78)
ANION GAP SERPL CALCULATED.3IONS-SCNC: 8 MMOL/L (ref 4–13)
AST SERPL W P-5'-P-CCNC: 35 U/L (ref 5–45)
BILIRUB SERPL-MCNC: 0.7 MG/DL (ref 0.2–1)
BUN SERPL-MCNC: 17 MG/DL (ref 5–25)
CALCIUM SERPL-MCNC: 8.8 MG/DL (ref 8.3–10.1)
CHLORIDE SERPL-SCNC: 103 MMOL/L (ref 100–108)
CHOLEST SERPL-MCNC: 186 MG/DL (ref 50–200)
CO2 SERPL-SCNC: 26 MMOL/L (ref 21–32)
CREAT SERPL-MCNC: 0.91 MG/DL (ref 0.6–1.3)
ERYTHROCYTE [DISTWIDTH] IN BLOOD BY AUTOMATED COUNT: 13.3 % (ref 11.6–15.1)
EST. AVERAGE GLUCOSE BLD GHB EST-MCNC: 108 MG/DL
GFR SERPL CREATININE-BSD FRML MDRD: 89 ML/MIN/1.73SQ M
GLUCOSE SERPL-MCNC: 102 MG/DL (ref 65–140)
HBA1C MFR BLD: 5.4 %
HCT VFR BLD AUTO: 48.4 % (ref 36.5–49.3)
HDLC SERPL-MCNC: 38 MG/DL
HGB BLD-MCNC: 16.4 G/DL (ref 12–17)
LDLC SERPL CALC-MCNC: 129 MG/DL (ref 0–100)
MAGNESIUM SERPL-MCNC: 2.1 MG/DL (ref 1.6–2.6)
MCH RBC QN AUTO: 30.3 PG (ref 26.8–34.3)
MCHC RBC AUTO-ENTMCNC: 33.9 G/DL (ref 31.4–37.4)
MCV RBC AUTO: 90 FL (ref 82–98)
PLATELET # BLD AUTO: 185 THOUSANDS/UL (ref 149–390)
PMV BLD AUTO: 10.6 FL (ref 8.9–12.7)
POTASSIUM SERPL-SCNC: 3.8 MMOL/L (ref 3.5–5.3)
PROT SERPL-MCNC: 8.1 G/DL (ref 6.4–8.2)
RBC # BLD AUTO: 5.41 MILLION/UL (ref 3.88–5.62)
SODIUM SERPL-SCNC: 137 MMOL/L (ref 136–145)
TRIGL SERPL-MCNC: 94 MG/DL
WBC # BLD AUTO: 7.35 THOUSAND/UL (ref 4.31–10.16)

## 2021-01-06 PROCEDURE — 99245 OFF/OP CONSLTJ NEW/EST HI 55: CPT | Performed by: INTERNAL MEDICINE

## 2021-01-06 PROCEDURE — 85027 COMPLETE CBC AUTOMATED: CPT | Performed by: NURSE PRACTITIONER

## 2021-01-06 PROCEDURE — 80053 COMPREHEN METABOLIC PANEL: CPT | Performed by: NURSE PRACTITIONER

## 2021-01-06 PROCEDURE — 99226 PR SBSQ OBSERVATION CARE/DAY 35 MINUTES: CPT | Performed by: INTERNAL MEDICINE

## 2021-01-06 PROCEDURE — 80061 LIPID PANEL: CPT | Performed by: NURSE PRACTITIONER

## 2021-01-06 PROCEDURE — 83735 ASSAY OF MAGNESIUM: CPT | Performed by: NURSE PRACTITIONER

## 2021-01-06 PROCEDURE — 99245 OFF/OP CONSLTJ NEW/EST HI 55: CPT | Performed by: PSYCHIATRY & NEUROLOGY

## 2021-01-06 PROCEDURE — 93306 TTE W/DOPPLER COMPLETE: CPT | Performed by: INTERNAL MEDICINE

## 2021-01-06 PROCEDURE — 83036 HEMOGLOBIN GLYCOSYLATED A1C: CPT | Performed by: NURSE PRACTITIONER

## 2021-01-06 PROCEDURE — 93306 TTE W/DOPPLER COMPLETE: CPT

## 2021-01-06 PROCEDURE — 97161 PT EVAL LOW COMPLEX 20 MIN: CPT

## 2021-01-06 RX ORDER — PANTOPRAZOLE SODIUM 40 MG/1
40 TABLET, DELAYED RELEASE ORAL
Status: DISCONTINUED | OUTPATIENT
Start: 2021-01-06 | End: 2021-01-07 | Stop reason: HOSPADM

## 2021-01-06 RX ADMIN — ENOXAPARIN SODIUM 135 MG: 150 INJECTION SUBCUTANEOUS at 09:34

## 2021-01-06 RX ADMIN — CLOPIDOGREL BISULFATE 75 MG: 75 TABLET ORAL at 09:38

## 2021-01-06 RX ADMIN — PANTOPRAZOLE SODIUM 40 MG: 40 TABLET, DELAYED RELEASE ORAL at 09:34

## 2021-01-06 RX ADMIN — ASPIRIN 81 MG CHEWABLE TABLET 81 MG: 81 TABLET CHEWABLE at 09:34

## 2021-01-06 RX ADMIN — METOPROLOL TARTRATE 25 MG: 25 TABLET, FILM COATED ORAL at 20:03

## 2021-01-06 RX ADMIN — ENOXAPARIN SODIUM 135 MG: 150 INJECTION SUBCUTANEOUS at 20:02

## 2021-01-06 RX ADMIN — ATORVASTATIN CALCIUM 40 MG: 40 TABLET, FILM COATED ORAL at 17:02

## 2021-01-06 RX ADMIN — METOPROLOL TARTRATE 25 MG: 25 TABLET, FILM COATED ORAL at 09:38

## 2021-01-06 NOTE — PROGRESS NOTES
Progress Note Emmanuel Garay 1955, 60 y o  male MRN: [id number]    Unit/Bed#: [address] [place] [address] Encounter: [id number]    Primary Care Provider: Chelly Anderson MD   Date and time admitted to hospital: 1/5/2021  5:45 PM        * TIA (transient ischemic attack)  Assessment & Plan  Patient came home after work and had 2 beers  Wife reported left facial droop slurred speech  Patient's symptoms resolved in the ED  ·  CT of the head revealed a right distal M2/M3 branch occlusion  TPA was discussed but ultimately patient and wife declined  Admit to telemetry  · Patient currently on aspirin, Plavix and statin  · Lipid panel showed cholesterol 186,   · Hemoglobin A1c was 5 4  · MRI of the brain is pending  · 2D echo with bubble study pending  · Neurology consult appreciated  · Patient noted to be in atrial fibrillation in the emergency room  Patient might need to be transitioned to aspirin along with Eliquis    Atrial fibrillation [facility])  Assessment & Plan  Patient rate controlled atrial fibrillation in the emergency department, unclear onset  · Patient has known history of atrial fibrillation from 2015  Patient was on Coumadin anticoagulation previously and was transitioned to aspirin  · Patient's chads vascular score is 2 with an adjusted stroke rate of 2 2% per year  · Follow-up 2D echo  · Patient started on metoprolol 25 milligram p o  B i d   Verapamil was discontinued  · Discussed the risks and benefits of novel anticoagulants versus Coumadin  Patient agreeable to take Eliquis  · Patient is currently on Lovenox 1 milligram/kilogram subcutaneously b i d  Pending MRI  · Patient is scheduled for nuclear stress test in a m  To rule out ischemia      VTE Pharmacologic Prophylaxis:   Pharmacologic: Enoxaparin (Lovenox)  Mechanical VTE Prophylaxis in Place: Yes    Patient Centered Rounds: I have performed bedside rounds with nursing staff today      Discussions with Specialists or Other Care Team Provider: Dr Adeola Alva    Education and Discussions with Family / Patient: yes    Time Spent for Care: 45 minutes  More than 50% of total time spent on counseling and coordination of care as described above  Current Length of Stay: 0 day(s)    Current Patient Status: Observation   Certification Statement: The patient will continue to require additional inpatient hospital stay due to TIA, atrial fibrillation    Discharge Plan:  Home    Code Status: Level 1 - Full Code      Subjective:   Patient is feeling much better  Patient feels back to normal   No slurred speech left facial droop or weakness  Patient denies any chest pain, palpitations or shortness of breath  Objective:     Vitals:   Temp (24hrs), Av °F (36 7 °C), Min:97 6 °F (36 4 °C), Max:98 6 °F (37 °C)    Temp:  [97 6 °F (36 4 °C)-98 6 °F (37 °C)] 98 5 °F (36 9 °C)  HR:  [] 96  Resp:  [16-21] 18  BP: (121-152)/(71-93) 139/93  SpO2:  [90 %-97 %] 96 %  Body mass index is 37 81 kg/m²  Input and Output Summary (last 24 hours): Intake/Output Summary (Last 24 hours) at 2021 1427  Last data filed at 2021 0219  Gross per 24 hour   Intake 1310 ml   Output 200 ml   Net 1110 ml       Physical Exam:     Physical Exam  Constitutional:       General: He is not in acute distress  HENT:      Head: Normocephalic and atraumatic  Eyes:      Conjunctiva/sclera: Conjunctivae normal       Pupils: Pupils are equal, round, and reactive to light  Neck:      Musculoskeletal: Normal range of motion and neck supple  Cardiovascular:      Rate and Rhythm: Normal rate  Heart sounds: Murmur present  Comments: Irregularly irregular   ejection systolic murmur  Pulmonary:      Effort: Pulmonary effort is normal  No respiratory distress  Breath sounds: No wheezing, rhonchi or rales  Chest:      Chest wall: No tenderness  Abdominal:      General: Bowel sounds are normal  There is no distension  Palpations: Abdomen is soft  Tenderness: There is no abdominal tenderness  There is no guarding or rebound  Skin:     General: Skin is warm and dry  Findings: No rash  Neurological:      Mental Status: He is alert  Cranial Nerves: No cranial nerve deficit  Additional Data:     Labs:    Results from last 7 days   Lab Units 01/06/21  0831 01/05/21  1758   WBC Thousand/uL 7 35 7 89   HEMOGLOBIN g/dL 16 4 15 5   HEMATOCRIT % 48 4 45 2   PLATELETS Thousands/uL 185 182   NEUTROS PCT %  --  56   LYMPHS PCT %  --  31   MONOS PCT %  --  9   EOS PCT %  --  3     Results from last 7 days   Lab Units 01/06/21  0831   POTASSIUM mmol/L 3 8   CHLORIDE mmol/L 103   CO2 mmol/L 26   BUN mg/dL 17   CREATININE mg/dL 0 91   CALCIUM mg/dL 8 8   ALK PHOS U/L 63   ALT U/L 69   AST U/L 35     Results from last 7 days   Lab Units 01/05/21  1758   INR  1 06       * I Have Reviewed All Lab Data Listed Above  * Additional Pertinent Lab Tests Reviewed: All [facility] Admission Reviewed      Recent Cultures (last 7 days):           Last 24 Hours Medication List:   Current Facility-Administered Medications   Medication Dose Route Frequency Provider Last Rate    aspirin  81 mg Oral Daily HAILEE Gamboa      atorvastatin  40 mg Oral Daily With [employer], CRNP      clopidogrel  75 mg Oral Daily HAILEE Gamboa      enoxaparin  1 mg/kg Subcutaneous Q12H [address] Otoniel Vinh, CRNP      metoprolol tartrate  25 mg Oral Q12H [address] Otoniel Vinh, CRNP      pantoprazole  40 mg Oral Early Morning Otoniel Vinh, CRNP          Today, Patient Was Seen By: Deloris Rodriguez MD    ** Please Note: Dictation voice to text software may have been used in the creation of this document   **

## 2021-01-06 NOTE — CONSULTS
Clarice[address]   Neurology Initial Consult    Alen Jo is a 60 y o  male  [address] [place] 86-*          Information obtained from:   Chief Complaint   Patient presents with   [address]     Pt was last seen well at 1630, arrived with L facial droop  Assessment/Plan:    1  Questionable CVA of M to territory-MRI remains pending  2  Atrial fibrillation-not being treated with anticoagulation  3  Hypertension  4  Hyperlipidemia-not on statin prior to arrival     -monitor on telemetry  -neuro assessments per stroke pathway  -full-dose aspirin given in the ER  -continue baby aspirin 81 mg daily  -Lipitor 40 mg daily  -Plavix 300 mg given in the ER, then followed by 75 mg daily-continued pending outcome of MRI  -weight based Lovenox per Cardiology-cardiology recommending switched to Eliquis 5 mg b i d  When cleared by Neurology  -blood pressure management per Cardiology  -MRI of the brain pending  -echocardiogram completed  -PT/OT  -speech therapy  -cardiology planning nuclear stress test in a m  The patient is is 71-year-old male with comorbid history consistent with atrial fibrillation, hypertension and now hyperlipidemia  Patient was on anticoagulation for some time up until several years ago at which point this was discontinued  Patient returns from work yesterday sat down to play video games and have a beverage when he started slurring his words and having facial drooping  Patient's wife took a look at him brought him into the ER via car  Stroke alert was called upon patient's arrival, continued to have facial asymmetry  Was taken to CT scan for further testing  Patient noted to have right distal M2/M3 occlusion which is consistent with patient's symptomatology of left facial droop and slurred speech    Neurology MD had extensive discussion with patient and his wife noted occlusion was identified on imaging and possible likelihood that deficits would return due to this occlusion  Risks of tPA including symptomatic hemorrhage had to be weighed against the potential facial weakness and dysarthria  Possibility that tPA would not be offered if symptoms return, unless stat MRI should be performed and show low risk of hemorrhage  Patient is wife for aware of a complex situation they opted not to take the risk of tPA at this time  Patient was then loaded on Plavix and aspirin noted for further evaluation and treatment under the stroke pathway  MRI remains pending at this time, echocardiogram has been completed and Cardiology has followed up with their patient recommendations  Patient started on weight based Lovenox however cardiology prefers starting patient on Eliquis 5 mg b i d  When cleared by Neurology  Will await MRI of the brain for further recommendations at this time  Patient remains on dual anti-platelet therapies with statin  Will consider increase in statin to 80 mg daily pending outcome of MRI  Morris Cruz will need follow up in 8-10 weeks with general attending or advance practitioner  He will not require outpatient neurological testing  HPI:  Morris Cruz is a 52-year-old male with comorbid history consistent with hypertension, valve disease and atrial fibrillation  Patient previously been treated with anticoagulation using Coumadin up to approximately 4 years ago at which point this was discontinued  Patient has had follow-up with Cardiology since this time  Patient reported that he had gotten home from work, set down the play video games and have a beer when he started having slurred speech  He stated that his son had indicated to his wife that he was having odd speech and when she came to look at him she noticed a facial droop at which point she got him in the car brought him to the ER  Upon arrival to the ER at 5:30 p m , stroke alert was called and patient was taken to CT scan    CT of the head with no acute intracranial abnormalities, CT of the head and neck with positive right distal M2/M3 occlusion  No other atherosclerosis was noted on this exam   ER MD and Neurology MD spoke directly with patient and his wife regarding use of tPA  It is noted that signs and symptoms do correlate with the M2 territory, this is unlikely to improve with without treatment  The benefits and the risk of tPA were discussed with the patient and his wife and ultimately they decided against the use of thrombolytics therapy  Patient received full-dose aspirin and Plavix load 300 mg, was admitted to telemetry for further workup and treatment under the stroke pathway  Met with patient at bedside, he had noted no complaints of visual disturbances, dizziness, headache, paresthesias of the upper extremities or face, and denied any reports of eye drooping during this event  He did present today with slight labial droop of the left lower mouth which did recover with smile  No eye drooping, no facial paresthesia noted  Patient had good visual fields with EOM intact  Strength, reflexes and sensations were all equal and normal throughout  Patient did ambulate, gait is steady has good heel toe walking and tandem gait  Echocardiogram completed with EF of 65% with no diagnostic regional wall motion abnormality  Wall thickness was moderately increased  Left atrium was mildly to moderately dilated as well as the right atrium  There was no good quality bubble study recorded during this exam     Discussed with Cardiology this a m , planning on initiating patient on weight based Lovenox and schedule for stress testing in a m  Mitch Ammon Patient continues on dual anti-platelet therapies for now will consider discontinuation of Plavix once MRI is completed  Patient does have elevated LDL at 129, is currently on Lipitor 40 mg daily, he is not on this at home  Pending outcome of MRI will consider use of high-dose statin        Past Medical History:   Diagnosis Date    [facility] fibrillation ([facility] )     Hypertension     Paroxysmal atrial fibrillation ([facility] )     Valvular heart disease        Past Surgical History:   Procedure Laterality Date    HERNIA REPAIR      KNEE SURGERY         No Known Allergies      Current Facility-Administered Medications:     aspirin chewable tablet 81 mg, 81 mg, Oral, Daily, Isela Erica, CRNP, 81 mg at 01/06/21 0934    atorvastatin (LIPITOR) tablet 40 mg, 40 mg, Oral, Daily With Dinner, Isela Erica, CRNP, 40 mg at 01/05/21 1853    clopidogrel (PLAVIX) tablet 75 mg, 75 mg, Oral, Daily, Isela Erica, CRNP, 75 mg at 01/06/21 1402    enoxaparin (LOVENOX) subcutaneous injection 135 mg, 1 mg/kg, Subcutaneous, Q12H [address], Yan Link, CRNP, 135 mg at 01/06/21 0934    metoprolol tartrate (LOPRESSOR) tablet 25 mg, 25 mg, Oral, Q12H [address], Yan Link, CRNP, 25 mg at 01/06/21 7668    pantoprazole (PROTONIX) EC tablet 40 mg, 40 mg, Oral, Early Morning, Yan Link, CRNP, 40 mg at 01/06/21 8840    Social History     Socioeconomic History    Marital status: /Civil Union     Spouse name: Not on file    Number of children: Not on file    Years of education: Not on file    Highest education level: Not on file   Occupational History    Not on file   Social Needs    Financial resource strain: Not on file    Food insecurity     Worry: Not on file     Inability: Not on file   [employer] needs     Medical: Not on file     Non-medical: Not on file   Tobacco Use    Smoking status: Never Smoker    Smokeless tobacco: Never Used   Substance and Sexual Activity    Alcohol use:  Yes     Alcohol/week: 6 0 standard drinks     Types: 6 Cans of beer per week     Frequency: 2-3 times a week     Drinks per session: 1 or 2     Binge frequency: Monthly    Drug use: No    Sexual activity: Not on file     Comment: not asked   Lifestyle    Physical activity     Days per week: Not on file     Minutes per session: Not on file    Stress: Not on file   Relationships  Social connections     Talks on phone: Not on file     Gets together: Not on file     Attends Confucianist service: Not on file     Active member of club or organization: Not on file     Attends meetings of clubs or organizations: Not on file     Relationship status: Not on file    Intimate partner violence     Fear of current or ex partner: Not on file     Emotionally abused: Not on file     Physically abused: Not on file     Forced sexual activity: Not on file   Other Topics Concern    Not on file   Social History Narrative    Not on file       Family History   Problem Relation Age of Onset    No Known Problems Mother     Stroke Father     Heart failure Brother         heart problems    Brain cancer Brother          0         Review of systems:  Please see HPI for positive symptoms  Constitutional: No fever, no chills, no weight change  Ocular: No diplopia, no blurred vision, spots/zigzag lines  HEENT:  No sore throat, headache or congestion  COR:  No chest pain  No palpitations  Lungs:  no sob  GI:  no  nausea, no vomiting, no diarrhea, no constipation, no anorexia  :  No dysuria, frequency, or urgency  No hematuria  Musculoskeletal:  No joint pain or swelling   Skin:  No rash or itching  Psychiatric:  no anxiety, no depression  Endocrine:  No polyuria or polydipsia  Physical examination:  /93   Pulse 96   Temp 98 5 °F (36 9 °C)   Resp 18   Wt 130 kg (286 lb 9 6 oz)   SpO2 96%   BMI 37 81 kg/m²     GENERAL APPEARANCE:  The patient is alert, oriented  HEENT:  Head is normocephalic  Pupils are equal and reactive  NECK:  Supple without lymphadenopathy  HEART:  Regular rate and rhythm  LUNGS:  clear to auscultation  No crackles or wheezes are heard  ABDOMEN:  Soft, nontender, nondistended with good bowel sounds heard  EXTREMITIES:  Without cyanosis, clubbing or edema  Mental status: The patient is alert, attentive, and oriented     Speech is clear and fluent, good repetition, comprehension, and naming  Cranial nerves:  CN II: Visual fields are full to confrontation  Fundoscopic exam is normal with sharp discs and no vascular changes  Pupils are 3 mm and briskly reactive to light  CN III, IV, VI: At primary gaze, there is no eye deviation  CN V: Facial sensation is intact to pinprick in all 3 divisions bilaterally  Corneal responses are intact  CN VII: Face is asymmetric with normal eye closure and smile  CN VIII: Hearing is normal to rubbing fingers  CN IX, X: Palate elevates symmetrically  Phonation is normal   CN XI: Head turning and shoulder shrug are intact  CN XII: Tongue is midline with normal movements and no atrophy  Motor: There is no pronator drift of out-stretched arms  Muscle bulk and tone are normal    Muscle exam  Arm Right Left Leg Right Left   Deltoid 5/5 5/5 Iliopsoas 5/5 5/5   Biceps 5/5 5/5 Quads 5/5 5/5   Triceps 5/5 5/5 Hamstrings 5/5 5/5   Wrist Extension 5/5 5/5 Ankle Dorsi Flexion 5/5 5/5   Wrist Flexion 5/5 5/5 Ankle Plantar Flexion 5/5 5/5        Reflexes    RJ BJ TJ KJ AJ Plantars Carrasco's   Right 2+ 2+ 2+ 2+ 2+ Downgoing Not present   Left 2+ 2+ 2+ 2+ 2+ Downgoing Not present      Sensory:  Light touch, Temperature, position sense, and vibration sense are intact in fingers and toes  Coordination:  Rapid alternating movements and fine finger movements are intact  There is no dysmetria on finger-to-nose and heel-knee-shin  There are no abnormal or extraneous movements  Romberg negative  Gait/Stance:  Normal heel/toe walking and tandem gait      Lab Results   Component Value Date    WBC 7 35 01/06/2021    HGB 16 4 01/06/2021    HCT 48 4 01/06/2021    MCV 90 01/06/2021     01/06/2021     Lab Results   Component Value Date    HGBA1C 5 4 01/06/2021     Lab Results   Component Value Date    ALT 69 01/06/2021    AST 35 01/06/2021    ALKPHOS 63 01/06/2021    BILITOT 0 6 12/26/2015     Lab Results   Component Value Date    GLUCOSE 104 12/26/2015    CALCIUM 8 8 01/06/2021     12/26/2015    K 3 8 01/06/2021    CO2 26 01/06/2021     01/06/2021    BUN 17 01/06/2021    CREATININE 0 91 01/06/2021     Cholesterol 186      Review of reports and notes reveal:  Independent Interpretation of images or specimens:  Cta Head And Neck With And Without Contrast    Result Date: 1/5/2021  Right distal M2/M3 branch occlusion  Cardiomegaly  I personally discussed this study with DR BOSWELL NEUROLOGY on 1/5/2021 at 6:11 PM  Workstation performed: [id number]     Ct Stroke Alert Brain    Result Date: 1/5/2021  No acute intracranial abnormality  Microangiopathic changes  Findings were directly discussed with Yenny Maynard on 1/5/2021 5:49 PM  Workstation performed: [id number]           Thank you for this consult  Total time of encounter: 70 Minutes  More than 50% of time was spent in counseling and coordination of care of patient

## 2021-01-06 NOTE — CONSULTS
Consult received for stroke pathway  PT reports previously following keto diet though stopped this after COVID began, wt regain from 244# to 286#  Pt tries to select lean meats at home, encouraged unsaturated fats instead of saturated fat options  Discussed increasing whole grains though pt did not seem interested in this, possibly will be restarting keto diet for wt loss  Assessment completed remotely, colleague to provide nutrition information to pt  Continue diet as ordered

## 2021-01-06 NOTE — H&P
H&P- Ramila Barragan [birth year], 60 y o  male MRN: [id number]    Unit/Bed#: ED CT1 Encounter: [id number]    Primary Care Provider: Tesha Chow MD   Date and time admitted to hospital: 1/5/2021  5:45 PM    * TIA (transient ischemic attack)  Assessment & Plan  Patient came home after work and had 2 beers  He then tried to talk to his wife who noted that he had a left-sided facial droop, garbled speech and drooling  This prompted them to bring him immediately to the emergency department where he was called as a stroke alert  CT of the head revealed a right distal M2/M3 branch occlusion  TPA was discussed but ultimately patient and wife declined  Fortunately all of his symptoms resolved in the emergency department  Neurology recommendations are appreciated  Of note, patient was found to be in atrial fibrillation in the emergency department, patient was previously on Coumadin for AFib but it was stopped as he had been in normal sinus for prolonged period of time  · Admit to telemetry  · Continue aspirin and Plavix, statin  · Neuro checks q 4 hours  · Lipid panel, Hga1c in AM  · MRI in AM  · Echocardiogram  · Neuro eval    Atrial fibrillation [facility])  Assessment & Plan  Patient rate controlled atrial fibrillation in the emergency department, unclear onset  · Echo pending for the a m  · Cardiology consultation    VTE Prophylaxis: Enoxaparin (Lovenox)  / sequential compression device   Code Status: No Order    Anticipated Length of Stay:  Patient will be admitted on an Observation basis with an anticipated length of stay of less than 2 midnights  Justification for Hospital Stay: TIA     Total Time for Visit, including Counseling / Coordination of Care: 15 minutes  Greater than 50% of this total time spent on direct patient counseling and coordination of care      Chief Complaint:   STROKE Alert (Pt was last seen well at 1630, arrived with L facial droop )      History of Present Illness:    Ramila Barragan is a 60 y o  male with a PMH of hypertension, atrial fibrillation who presents with facial droop  Patient came home after work and had 2 beers  He then tried to talk to his wife who noted that he had a left-sided facial droop, garbled speech and drooling  This prompted them to bring him immediately to the emergency department where he was called as a stroke alert  CT of the head revealed a right distal M2/M3 branch occlusion  TPA was discussed but ultimately patient and wife declined  Fortunately all of his symptoms resolved in the emergency department  Neurology recommendations are appreciated  Of note, patient was found to be in atrial fibrillation in the emergency department, patient was previously on Coumadin for AFib but it was stopped as he had been in normal sinus for prolonged period of time  Labs unremarkable  Review of Systems:    Review of Systems   Constitutional: Negative  HENT: Negative  Eyes: Negative  Respiratory: Negative  Cardiovascular: Negative  Gastrointestinal: Negative  Endocrine: Negative  Genitourinary: Negative  Musculoskeletal: Negative  Skin: Negative  Allergic/Immunologic: Negative  Neurological: Positive for facial asymmetry and speech difficulty  Hematological: Negative  Psychiatric/Behavioral: Negative  Past Medical and Surgical History:     Past Medical History:   Diagnosis Date    [facility]     Hypertension     [facility]     Valvular heart disease        Past Surgical History:   Procedure Laterality Date    HERNIA REPAIR      KNEE SURGERY         Meds/Allergies:    Prior to Admission medications    Medication Sig Start Date End Date Taking?  Authorizing Provider   aspirin 325 mg tablet Take by mouth daily 3/18/09  Yes Historical Provider, MD   verapamil (CALAN-SR) 240 mg CR tablet Take 1 tablet (240 mg total) by mouth daily 4/23/20  Yes Shanna Pruitt MD   mometasone (NASONEX) 50 mcg/act nasal spray 2 puffs into each nostril daily 10/17/12 1/5/21  Historical Provider, MD       Allergies: No Known Allergies    Social History:     Marital Status: /Civil Union   Substance Use History:   Social History     Substance and Sexual Activity   Alcohol Use Yes    Frequency: 2-3 times a week    Drinks per session: 1 or 2     Social History     Tobacco Use   Smoking Status Never Smoker   Smokeless Tobacco Never Used     Social History     Substance and Sexual Activity   Drug Use No       Family History:    Family History   Problem Relation Age of Onset    No Known Problems Mother     Stroke Father     Heart failure Brother         heart problems    Brain cancer Brother          0       Physical Exam:     Vitals:   Blood Pressure: 138/84 (21)  Pulse: 82 (21)  Temperature: 97 6 °F (36 4 °C) (21)  Temp Source: Tympanic (21 174)  Respirations: 19 (21 1845)  Weight - Scale: 130 kg (286 lb 9 6 oz) (21 1800)  SpO2: 96 % (21)    Physical Exam  Vitals signs and nursing note reviewed  Constitutional:       Appearance: Normal appearance  HENT:      Head: Normocephalic and atraumatic  Nose: Nose normal    Eyes:      Extraocular Movements: Extraocular movements intact  Cardiovascular:      Rate and Rhythm: Normal rate and regular rhythm  Heart sounds: No murmur  Pulmonary:      Effort: Pulmonary effort is normal       Breath sounds: Normal breath sounds  Abdominal:      General: Abdomen is flat  Bowel sounds are normal  There is no distension  Palpations: Abdomen is soft  Tenderness: There is no abdominal tenderness  Musculoskeletal: Normal range of motion  General: No swelling  Skin:     General: Skin is warm and dry  Neurological:      General: No focal deficit present  Mental Status: He is alert and oriented to person, place, and time  Mental status is at baseline        GCS: GCS eye subscore is 4  GCS verbal subscore is 5  GCS motor subscore is 6  Cranial Nerves: No cranial nerve deficit, dysarthria or facial asymmetry  Sensory: Sensation is intact  No sensory deficit  Motor: Motor function is intact  No weakness  Coordination: Coordination is intact  Coordination normal  Finger-Nose-Finger Test and Heel to Sue Flight Test normal    Psychiatric:         Mood and Affect: Mood normal          Behavior: Behavior normal            Additional Data:     Lab Results: I have personally reviewed pertinent reports  Results from last 7 days   Lab Units 01/05/21  1758   WBC Thousand/uL 7 89   HEMOGLOBIN g/dL 15 5   HEMATOCRIT % 45 2   PLATELETS Thousands/uL 182   NEUTROS PCT % 56     Results from last 7 days   Lab Units 01/05/21  1758   SODIUM mmol/L 135*   POTASSIUM mmol/L 4 1   CHLORIDE mmol/L 100   CO2 mmol/L 28   BUN mg/dL 21   CREATININE mg/dL 0 81   CALCIUM mg/dL 8 4   TOTAL BILIRUBIN mg/dL 0 20   ALK PHOS U/L 54   ALT U/L 58   AST U/L 33     Results from last 7 days   Lab Units 01/05/21  1758   INR  1 06     Results from last 7 days   Lab Units 01/05/21  1758   TROPONIN I ng/mL <0 02     Results from last 7 days   Lab Units 01/05/21  1734   POC GLUCOSE mg/dl 113           Imaging: I have personally reviewed pertinent reports  CTA head and neck with and without contrast   Final Result by Trisha Sheppard MD (01/05 1838)      Right distal M2/M3 branch occlusion  Cardiomegaly  I personally discussed this study with DR BOSWELL NEUROLOGY on 1/5/2021 at 6:11 PM                         Workstation performed: [id number]         CT stroke alert brain   Final Result by Trisha Sheppard MD (01/05 1802)      No acute intracranial abnormality  Microangiopathic changes        Findings were directly discussed with Ck Avalos on 1/5/2021 5:49 PM       Workstation performed: [id number]             CTA head and neck with and without contrast   Final Result      Right distal M2/M3 branch occlusion  Cardiomegaly  I personally discussed this study with DR ELBERT SQUIRES on 1/5/2021 at 6:11 PM                         Workstation performed: [id number]         CT stroke alert brain   Final Result      No acute intracranial abnormality  Microangiopathic changes  Findings were directly discussed with Beatris Null on 1/5/2021 5:49 PM       Workstation performed: [id number]             EKG, Pathology, and Other Studies Reviewed on Admission:   · EKG: atrial fibrillation     Allscripts / Epic Records Reviewed: Yes     ** Please Note: This note has been constructed using a voice recognition system   **

## 2021-01-06 NOTE — CONSULTS
Consultation - Cardiology   Camille Núñez 60 y o  male MRN: [id number]  Unit/Bed#: [address] [place] [address] Encounter: [id number]    Assessment/Plan     Assessment:  1  TIA  2  Paroxysmal atrial fibrillation  3  Hypertension  4  Obesity       Plan:  Patient has been admitted to the hospitalist service  1  Patient is ordered for echocardiogram with bubble study to evaluate cardiac function, structure, wall motion and for any PFO ASD  2  Case discussed with Neurology, will start patient on weight based Lovenox 1 milligram/kilogram q 12 hours as patient's CHADS2 Vasc score is now 3 due to recent TIA  Will transition patient to oral anticoagulation prior to discharge  3  Will hold verapamil and add low-dose beta-blocker  4  Patient for MRI of the brain today    5  Will obtain nuclear stress test in a m  To evaluate for ischemia for consideration of possible antiarrhythmic therapy  History of Present Illness   Physician Requesting Consult: Tracey Owen MD  Reason for Consult / Principal Problem: TIA, PAF      HPI: Camille Núñez is a 60y o  year old male who presented to the ED with a one hour onset of Left facial droop and slurring of speech  At the time of arrival in the emergency room his speech was beginning to improved  Stroke alert was called and patient was evaluated by Neurology  CTA of the head neck did demonstrate an occlusion of the right distal M2/M3 branch  Patient did not receive tPA  Patient was also noted to be in rapid atrial fibrillation  Patient does have a history of paroxysmal atrial fibrillation of follows with Dr Jean Carlos Shell  Many years ago, patient had been on Coumadin, but this was discontinued due to a CHADS2 Vasc score of 1  Patient has been on verapamil and aspirin 325 mg  In the emergency room patient received 300 mg of Plavix in addition to aspirin  Patient states he was initially diagnosed with paroxysmal atrial fibrillation in 2003   He has been maintained on verapamil since that time  Patient does note he does have episodes of atrial fibrillation that have been noted on monitoring and EKG, but he does not have any symptoms he denies any palpitations, shortness of breath, change in his ability to perform activities  Patient has previously been tested for obstructive sleep apnea which was negative  Inpatient consult to Cardiology  Consult performed by: HAILEE Mcdonald  Consult ordered by: HAILEE Emerson          Review of Systems   Constitutional: Negative  Negative for activity change, appetite change and fatigue  HENT: Negative  Negative for congestion, facial swelling, sinus pain, sore throat and trouble swallowing  Eyes: Negative  Negative for photophobia and visual disturbance  Respiratory: Negative  Negative for chest tightness and shortness of breath  Cardiovascular: Negative  Negative for chest pain, palpitations and leg swelling  Gastrointestinal: Negative  Negative for abdominal distention, constipation, diarrhea, nausea and vomiting  Endocrine: Negative  Negative for polydipsia, polyphagia and polyuria  Genitourinary: Negative  Negative for difficulty urinating  Musculoskeletal: Negative  Negative for arthralgias, back pain and gait problem  Skin: Negative  Negative for color change  Neurological: Positive for facial asymmetry and speech difficulty  Negative for dizziness, syncope, weakness and light-headedness  Hematological: Negative  Psychiatric/Behavioral: Negative          Historical Information   Past Medical History:   Diagnosis Date    Atrial fibrillation (HCC)     Hypertension     Paroxysmal atrial fibrillation (HCC)     Valvular heart disease      Past Surgical History:   Procedure Laterality Date    HERNIA REPAIR      KNEE SURGERY       Social History     Substance and Sexual Activity   Alcohol Use Yes    Alcohol/week: 6 0 standard drinks    Types: 6 Cans of beer per week    Frequency: 2-3 times a week    Drinks per session: 1 or 2    Binge frequency: Monthly     Social History     Substance and Sexual Activity   Drug Use No     E-Cigarette/Vaping     E-Cigarette/Vaping Substances     Social History     Tobacco Use   Smoking Status Never Smoker   Smokeless Tobacco Never Used     Family History:   Family History   Problem Relation Age of Onset    No Known Problems Mother     Stroke Father     Heart failure Brother         heart problems    Brain cancer Brother          0       Meds/Allergies   all current active meds have been reviewed, current meds:   Current Facility-Administered Medications   Medication Dose Route Frequency    aspirin chewable tablet 81 mg  81 mg Oral Daily    atorvastatin (LIPITOR) tablet 40 mg  40 mg Oral Daily With Dinner    clopidogrel (PLAVIX) tablet 75 mg  75 mg Oral Daily    enoxaparin (LOVENOX) subcutaneous injection 135 mg  1 mg/kg Subcutaneous Q12H [address]    metoprolol tartrate (LOPRESSOR) tablet 25 mg  25 mg Oral Q12H [address]    pantoprazole (PROTONIX) EC tablet 40 mg  40 mg Oral Early Morning    and PTA meds:   Prior to Admission Medications   Prescriptions Last Dose Informant Patient Reported? Taking?   aspirin 325 mg tablet 2021 at Unknown time Self Yes Yes   Sig: Take by mouth daily   verapamil (CALAN-SR) 240 mg CR tablet 2021 at Unknown time  No Yes   Sig: Take 1 tablet (240 mg total) by mouth daily      Facility-Administered Medications: None     No Known Allergies    Objective   Vitals: Blood pressure 139/93, pulse 96, temperature 98 5 °F (36 9 °C), resp  rate 18, weight 130 kg (286 lb 9 6 oz), SpO2 96 %    Orthostatic Blood Pressures      Most Recent Value   Blood Pressure  139/93 filed at 2021 0734   Patient Position - Orthostatic VS  Sitting filed at 2021 2215            Intake/Output Summary (Last 24 hours) at 2021 0908  Last data filed at 2021 0219  Gross per 24 hour   Intake 1310 ml   Output 200 ml   Net 1110 ml       Invasive Devices     Peripheral Intravenous Line            Peripheral IV 01/05/21 Right Antecubital less than 1 day                Physical Exam  Constitutional:       General: He is not in acute distress  Appearance: Normal appearance  He is obese  HENT:      Head: Normocephalic and atraumatic  Right Ear: External ear normal       Left Ear: External ear normal       Nose: Nose normal    Eyes:      General: No scleral icterus  Right eye: No discharge  Left eye: No discharge  Conjunctiva/sclera: Conjunctivae normal       Pupils: Pupils are equal, round, and reactive to light  Neck:      Musculoskeletal: Normal range of motion and neck supple  Vascular: No carotid bruit  Cardiovascular:      Rate and Rhythm: Tachycardia present  Rhythm irregularly irregular  Pulses: Normal pulses  Heart sounds: No murmur  Pulmonary:      Effort: Pulmonary effort is normal  No respiratory distress  Breath sounds: Normal breath sounds  No wheezing, rhonchi or rales  Abdominal:      General: Bowel sounds are normal  There is no distension  Palpations: Abdomen is soft  Musculoskeletal:      Right lower leg: No edema  Left lower leg: No edema  Skin:     General: Skin is warm and dry  Capillary Refill: Capillary refill takes less than 2 seconds  Neurological:      Mental Status: He is alert and oriented to person, place, and time  Cranial Nerves: Facial asymmetry present  Comments: Slight left facial droop   Psychiatric:         Attention and Perception: Attention normal          Mood and Affect: Mood normal          Speech: Speech normal          Behavior: Behavior normal  Behavior is cooperative  Thought Content: Thought content normal          Cognition and Memory: Cognition normal          Judgment: Judgment normal          Lab Results:   I have personally reviewed pertinent lab results      CBC with diff:   Results from last 7 days   Lab Units 01/06/21  0831   WBC Thousand/uL 7 35   RBC Million/uL 5 41   HEMOGLOBIN g/dL 16 4   HEMATOCRIT % 48 4   MCV fL 90   MCH pg 30 3   MCHC g/dL 33 9   RDW % 13 3   MPV fL 10 6   PLATELETS Thousands/uL 185     CMP:   Results from last 7 days   Lab Units 01/06/21  0831   SODIUM mmol/L 137   POTASSIUM mmol/L 3 8   CHLORIDE mmol/L 103   CO2 mmol/L 26   BUN mg/dL 17   CREATININE mg/dL 0 91   CALCIUM mg/dL 8 8   AST U/L 35   ALT U/L 69   ALK PHOS U/L 63   EGFR ml/min/1 73sq m 89     Troponin:   0   Lab Value Date/Time    TROPONINI <0 02 01/05/2021 1758     BNP:   Results from last 7 days   Lab Units 01/06/21  0831   POTASSIUM mmol/L 3 8   CHLORIDE mmol/L 103   CO2 mmol/L 26   BUN mg/dL 17   CREATININE mg/dL 0 91   CALCIUM mg/dL 8 8   EGFR ml/min/1 73sq m 89     Coags:   Results from last 7 days   Lab Units 01/05/21  1758   PTT seconds 28   INR  1 06     Magnesium:   Results from last 7 days   Lab Units 01/06/21  0831   MAGNESIUM mg/dL 2 1     Lipid Profile:   Results from last 7 days   Lab Units 01/06/21  0831   HDL mg/dL 38*   LDL CALC mg/dL 129*   TRIGLYCERIDES mg/dL 94     Imaging: I have personally reviewed pertinent reports      EKG:  Atrial fibrillation with rapid ventricular response  VTE Prophylaxis: Sequential compression device (Venodyne)  and Enoxaparin (Lovenox)    Code Status: Level 1 - Full Code  Advance Directive and Living Will:      Power of :    POLST:      [facility]  Cardiology

## 2021-01-06 NOTE — ASSESSMENT & PLAN NOTE
Patient rate controlled atrial fibrillation in the emergency department, unclear onset  · Echo pending for the a m    · Cardiology consultation

## 2021-01-06 NOTE — OCCUPATIONAL THERAPY NOTE
Death Note:    Called to bedside given asystole noted on monitor. Patient assessed, no palpable pulse, no breath sounds or heart sounds on auscultation. No chest rise noted. No pupillary response. Patient's time of death called at 19:57.    Constance Angela MD  PGY-2 General Surgery   [phone]       OT EVALUATION       01/06/21 0857   Note Type   Note type Screen   Cancel Reasons   (No skilled OT needs required at this time   Pt is independent)   Lucrecia Briones MS OTR/L [id number]

## 2021-01-06 NOTE — ASSESSMENT & PLAN NOTE
Patient came home after work and had 2 beers  He then tried to talk to his wife who noted that he had a left-sided facial droop, garbled speech and drooling  This prompted them to bring him immediately to the emergency department where he was called as a stroke alert  CT of the head revealed a right distal M2/M3 branch occlusion  TPA was discussed but ultimately patient and wife declined  Fortunately all of his symptoms resolved in the emergency department  Neurology recommendations are appreciated  Of note, patient was found to be in atrial fibrillation in the emergency department, patient was previously on Coumadin for AFib but it was stopped as he had been in normal sinus for prolonged period of time      · Admit to telemetry  · Continue aspirin and Plavix, statin  · Neuro checks q 4 hours  · Lipid panel, Hga1c in AM  · MRI in AM  · Echocardiogram  · Neuro eval

## 2021-01-06 NOTE — ASSESSMENT & PLAN NOTE
Patient came home after work and had 2 beers  Wife reported left facial droop slurred speech  Patient's symptoms resolved in the ED  ·  CT of the head revealed a right distal M2/M3 branch occlusion  TPA was discussed but ultimately patient and wife declined  Admit to telemetry  · Patient currently on aspirin, Plavix and statin  · Lipid panel showed cholesterol 186,   · Hemoglobin A1c was 5 4  · MRI of the brain is pending  · 2D echo with bubble study pending  · Neurology consult appreciated  · Patient noted to be in atrial fibrillation in the emergency room    Patient might need to be transitioned to aspirin along with Eliquis

## 2021-01-06 NOTE — SPEECH THERAPY NOTE
Speech Language/Pathology  Consult received  Records reviewed  Pt admitted c symptoms concerning for CVA/TIA  Pt passed RN Dysphagia Assessment  Communication deficits denied  NIH score 3- however symptoms reportedly resolved in the ER  MRI results negative versus pending  No additional inpatient Speech Pathology evaluation appears indicated at this time  Please re-consult if additional concerns arise  Thank you      Aguilar Tomas MS CCC-SLP  Speech Language Pathologist  Available via [facility] License # [id number]  [facility] # [id number]- [id number]

## 2021-01-06 NOTE — PHYSICAL THERAPY NOTE
PT EVALUATION    Pt remains independent with ADLs and ambulation  No skilled PT or OT needs noted at this time  01/06/21 0850   Note Type   Note type Evaluation   Pain Assessment   Pain Assessment Tool Pain Assessment not indicated - pt denies pain   Home Living   Type of [address] Two level   Prior Function   Level of [place] Independent with ADLs and functional mobility   Lives With Spouse   ADL Assistance Independent   Vocational   (school bus driveer)   General   Additional Pertinent History Pt admitted with likely TIA, all symptoms now resolved (slurred speech, facial droop)   Cognition   Overall Cognitive Status WFL   RUE Assessment   RUE Assessment WNL   LUE Assessment   LUE Assessment WNL   RLE Assessment   RLE Assessment WNL   LLE Assessment   LLE Assessment WNL   Coordination   Movements are Fluid and Coordinated   (intact finger/nose, no pron drift, intact heel/shin)   Bed Mobility   Supine to Sit 7  Independent   Sit to Supine 7  Independent   Transfers   Sit to Stand 7  Independent   Stand to Sit 7  Independent   Stand pivot 7  Independent   Ambulation/Elevation   Gait pattern WNL   Gait Assistance 7  Independent   Distance 150 feet   Balance   Static Sitting Good   Dynamic Sitting Good   Static Standing Good   Dynamic Standing Good   Ambulatory Good   Assessment   Prognosis Good   Problem List   (none)   Assessment Patient seen for Physical Therapy evaluation  Patient admitted with TIA (transient ischemic attack)  Comorbidities affecting patient's physical performance include: afib  Personal factors affecting patient at time of initial evaluation include: lives in 2 story house  Prior to admission, patient was independent with functional mobility without assistive device, independent with ADLS and works part time  Please find objective findings from Physical Therapy assessment regarding body systems outlined above with impairments and limitations including none    The Barthel Index was used as a functional outcome tool presenting with a score of 100 today indicating no limitations of functional mobility and ADLS  Patient's clinical presentation is currently stable as seen in patient's presentation of baseline  Pt would benefit from continued Physical Therapy treatment to address deficits as defined above and maximize level of functional mobility  As demonstrated by objective findings, the assigned level of complexity for this evaluation is low     Plan   Treatment/Interventions   (ambulate ad loni)   Recommendation   PT Discharge Recommendation Return to previous environment with no needs   Modified [place]ilib[place] Scale   Modified [place] Scale 0   Barthel Index   Feeding 10   Bathing 5   Grooming Score 5   Dressing Score 10   Bladder Score 10   Bowels Score 10   Toilet Use Score 10   Transfers (Bed/Chair) Score 15   Mobility (Level Surface) Score 15   Stairs Score 10   Barthel Index Score 586   Licensure   [place] License Number  Luna Falk [id number]

## 2021-01-06 NOTE — UTILIZATION REVIEW
Initial Clinical Review    Admission: Date/Time/Statement:     Admission Orders (From admission, onward)     Ordered        01/05/21 1955  Place in Observation  Once                   Orders Placed This Encounter   Procedures    Place in Observation     Standing Status:   Standing     Number of Occurrences:   1     Order Specific Question:   Admitting Physician     Answer:   Pilar Leo     Order Specific Question:   Level of Care     Answer:   Med Surg [[id number]]     ED Arrival Information     Expected Arrival Acuity Means of Arrival Escorted By Service Admission Type    - 1/5/2021 17:29 Immediate Walk-In Spouse Hospitalist Emergency    Arrival Complaint    Possible STROKE        Chief Complaint   Patient presents with    STROKE Alert     Pt was last seen well at 1630, arrived with L facial droop  Assessment/Plan:     60year old male presents to ed from home for evaluation and treatment of left facial droop  Last seen well 30 minutes prior to arrival   On arrival patient's wife reports left sided facial droop with slurred speech  She states that now speech has improved  Facial droop is the same  PMHX AFIB on taken off coumadin 5 years ago- now on ASA only  Clinical assessment significant for CTA showing right M2/M3 occlusion and cardiomegaly  EKG shows AFIB  NIHSS =2  tPA offered and declined  Symptoms much improved  Treated in ed with  9% ns bolus, aspirin, plavix and lipitor  Admit to observation for TIA  Plan includes:  Consult neurology, consult cardiology telemetry, neuro checks q4 hr, dysphagia assessment prior to po intake, PT/OT/Speech evaluations,ECHO, MRI brain,  HBA1C, lipie panel, LDL, MAG, CBC /platelets       Consult Neurology  35  AP: 60year old man with afib not on AC, presenting with L facial weakness and dysarthria, 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        Had an extensive discussion with the patient and his wife, Ana Lu      We discussed      -the occlusion identified on his imaging  -the possibility and likelihood that deficits would return due to the occlusion     -the risks of TPA, including symptomatic hemorrhage, which had to be weighed against potential facial weakness and dysarthria, and   -the possibility that TPA would not be offered if symptoms returned, unless a stat MRI could be performed that showed low risk of hemorrhage      The patient and his wife expressed understanding of this complex situation, and they opted not to take on the risk of TPA        I discussed with Dr Quyen Monsivais, and recommended a dual antiplatelet load with aspirin 325 and plavix 75, and high intensity statin, admission for stroke evaluation, and restarting anticoagulation if appropriate  1-6  Consult Cardiology  Patient has been admitted to the hospitalist service  1  Patient is ordered for echocardiogram with bubble study to evaluate cardiac function, structure, wall motion and for any PFO ASD      2  Case discussed with Neurology, will start patient on weight based Lovenox 1 milligram/kilogram q 12 hours as patient's CHADS2 Vasc score is now 3 due to recent TIA  Will transition patient to oral anticoagulation prior to discharge      3  Will hold verapamil and add low-dose beta-blocker      4  Patient for MRI of the brain today     5  Will obtain nuclear stress test in a m  To evaluate for ischemia for consideration of possible antiarrhythmic therapy  PT evaluation completed  No functional deficits identified  Addendum:  Continue TIA, AFIB evaluation          ED Triage Vitals   01/05/21 1745 01/05/21 1745 01/05/21 1745 01/05/21 1745 01/05/21 1745   97 6 °F (36 4 °C) 89 16 123/74 94 %      Tympanic Monitor         Pain Score       4          01/05/21 130 kg (286 lb 9 6 oz)     Additional Vital Signs:       Date/Time  Temp  Pulse  Resp  BP  MAP (mmHg)  SpO2  O2 Device   01/06/21 07:34:03  98 5 °F (36 9 °C)  96  18  139/93  108  96 %     01/06/21 04:25:52  97 6 °F (36 4 °C)  100  18  121/77  92  94 %     01/06/21 04:22:30    95    121/77  92  93 %     01/06/21 02:19:03  97 9 °F (36 6 °C)  91  18  134/79  97  94 %     01/06/21 02:18:30    97    134/79  97  90 %     01/05/21 2345  97 9 °F (36 6 °C)  78  18  133/78  96  96 %     01/05/21 23:36:31  97 9 °F (36 6 °C)  97  18  133/78  96  96 %     01/05/21 2300    99    133/77  96  95 %     01/05/21 22:58:52    76        95 %     01/05/21 22:50:20  98 6 °F (37 °C)  76  18  133/77  96  95 %     01/05/21 2215    82    139/75  98  92 %     01/05/21 2145    84    128/71  93  94 %     01/05/21 2015    82    138/84  105  96 %  None (Room air)   01/05/21 1845    92  19  152/81  110  97 %  None (Room air)   01/05/21 1830    88  20  135/83    96 %     01/05/21 1815    86  19  137/81    96 %     01/05/21 1800    96  21  145/79    97 %           Date and Time Eye Opening Best Verbal Response Best Motor Response [place] Coma Scale Score   01/05/21 2345 4 5 6 15   01/05/21 1845 4 5 6 15   01/05/21 1830 4 5 6 15   01/05/21 1815 4 5 6 15   01/05/21 1800 4 5 6 15            NIH Stroke Scale     Interval  Baseline     Level of Consciousness (1a )  0     LOC Questions (1b )  0     LOC Commands (1c )  0     Best Gaze (2 )  0     Visual (3 )  0     Facial Palsy (4 )  1     Motor Arm, Left (5a )  0     Motor Arm, Right (5b )  0     Motor Leg, Left (6a )  0     Motor Leg, Right (6b )  0     Limb Ataxia (7 )  0     Sensory (8 )  0     Best Language (9 )  0     Dysarthria (10 )  1     Extinction and Inattention (11 ) (Formerly Neglect)  0     Total  2             Pertinent Labs/Diagnostic Test Results:     CTA head and neck with and without contrast    (01/05 1838)      Right distal M2/M3 branch occlusion  Cardiomegaly  CT stroke alert brain    (01/05 1802)      No acute intracranial abnormality  Microangiopathic changes              MRI Inpatient Order    (Results Pending)     ECG 12 Lead Documentation Only   Date/Time: 1/5/2021 6:05 PM    Indications / Diagnosis:  Stroke alert, AFib     Patient location:  ED  Interpretation:     Interpretation: abnormal    Rate:     ECG rate:  98    ECG rate assessment: normal    Rhythm:     Rhythm: atrial fibrillation    Ectopy:     Ectopy: none    QRS:     QRS axis:  Left    QRS intervals:  Normal  Conduction:     Conduction: normal    ST segments:     ST segments:  Normal  T waves:     T waves: normal    Other findings:     Other findings: prolonged qTc interval            Results from last 7 days   Lab Units 01/06/21  0831 01/05/21  1758   WBC Thousand/uL 7 35 7 89   HEMOGLOBIN g/dL 16 4 15 5   HEMATOCRIT % 48 4 45 2   PLATELETS Thousands/uL 185 182   NEUTROS ABS Thousands/µL  --  4 42         Results from last 7 days   Lab Units 01/06/21  0831 01/05/21  1758   SODIUM mmol/L 137 135*   POTASSIUM mmol/L 3 8 4 1   CHLORIDE mmol/L 103 100   CO2 mmol/L 26 28   ANION GAP mmol/L 8 7   BUN mg/dL 17 21   CREATININE mg/dL 0 91 0 81   EGFR ml/min/1 73sq m 89 94   CALCIUM mg/dL 8 8 8 4   MAGNESIUM mg/dL 2 1  --      Results from last 7 days   Lab Units 01/06/21  0831 01/05/21  1758   AST U/L 35 33   ALT U/L 69 58   ALK PHOS U/L 63 54   TOTAL PROTEIN g/dL 8 1 7 3   ALBUMIN g/dL 3 7 3 4*   TOTAL BILIRUBIN mg/dL 0 70 0 20     Results from last 7 days   Lab Units 01/05/21  1734   POC GLUCOSE mg/dl 113     Results from last 7 days   Lab Units 01/06/21  0831 01/05/21  1758   GLUCOSE RANDOM mg/dL 102 96         Results from last 7 days   Lab Units 01/05/21  1758   TROPONIN I ng/mL <0 02         Results from last 7 days   Lab Units 01/05/21  1758   PROTIME seconds 13 8   INR  1 06   PTT seconds 28       Results from last 7 days   Lab Units 01/05/21  1817   CLARITY UA  Clear   COLOR UA  Colorless   SPEC GRAV UA  <=1 005   PH UA  6 0   GLUCOSE UA mg/dl Negative   KETONES UA mg/dl Negative   BLOOD UA  Trace-Intact*   PROTEIN UA mg/dl Negative   NITRITE UA  Negative   BILIRUBIN UA  Negative UROBILINOGEN UA E U /dl 0 2   LEUKOCYTES UA  Negative   WBC UA /hpf None Seen   RBC UA /hpf 0-1   BACTERIA UA /hpf Occasional   EPITHELIAL CELLS WET PREP /hpf Occasional     ED Treatment:   Medication Administration from 01/05/2021 1729 to 01/05/2021 2241       Date/Time Order Dose Route Action     01/05/2021 1801 sodium chloride 0 9 % bolus 1,000 mL 1,000 mL Intravenous New Bag     01/05/2021 1853 aspirin tablet 325 mg 325 mg Oral Given     01/05/2021 1853 clopidogrel (PLAVIX) tablet 300 mg 300 mg Oral Given     01/05/2021 1853 atorvastatin (LIPITOR) tablet 40 mg 40 mg Oral Given        Past Medical History:   Diagnosis    Atrial fibrillation (HCC)    Hypertension    Paroxysmal atrial fibrillation ([place] [address] )    Valvular heart disease     Present on Admission:   TIA (transient ischemic attack)      Admitting Diagnosis:     TIA (transient ischemic attack) [G45 9]  Facial droop [R29 810]  Stroke ([place] [address] ) [I63 9]  Stroke-like symptoms [R29 90]  Atrial fibrillation, unspecified type ([place] [address] ) [I48 91]    Age/Sex: 60 y o  male     Admission Orders:  Scheduled Medications:  aspirin, 81 mg, Oral, Daily  atorvastatin, 40 mg, Oral, Daily With Dinner  clopidogrel, 75 mg, Oral, Daily  enoxaparin, 1 mg/kg, Subcutaneous, Q12H [facility]  metoprolol tartrate, 25 mg, Oral, Q12H [place]  pantoprazole, 40 mg, Oral, Early Morning      Continuous IV Infusions:     PRN Meds:       IP CONSULT TO PHYSICAL MEDICINE REHAB  IP CONSULT TO NEUROLOGY  IP CONSULT TO CASE MANAGEMENT  IP CONSULT TO NUTRITION SERVICES  IP CONSULT TO CARDIOLOGY    Network Utilization Review Department  ATTENTION: Please call with any questions or concerns to [phone] and carefully listen to the prompts so that you are directed to the right person   All voicemails are confidential   Dasha Lo all requests for admission clinical reviews, approved or denied determinations and any other requests to dedicated fax number below belonging to the campus where the patient is receiving treatment   List of dedicated fax numbers for the Facilities:  [address] [place] [address] DENIALS (Administrative/Medical Necessity) [phone]   [address]  [facility] (Maternity/NICU/Pediatrics) [phone] [address] Colton Jennings 0934 (  [address] "Eliane" [address]) [address] [place] [address] Haley Montalvo [address] [place] [address] [phone]

## 2021-01-06 NOTE — ASSESSMENT & PLAN NOTE
Patient rate controlled atrial fibrillation in the emergency department, unclear onset  · Patient has known history of atrial fibrillation from 2015  Patient was on Coumadin anticoagulation previously and was transitioned to aspirin  · Patient's chads vascular score is 2 with an adjusted stroke rate of 2 2% per year  · Follow-up 2D echo  · Patient started on metoprolol 25 milligram p o  B i d   Verapamil was discontinued  · Discussed the risks and benefits of novel anticoagulants versus Coumadin  Patient agreeable to take Eliquis  · Patient is currently on Lovenox 1 milligram/kilogram subcutaneously b i d  Pending MRI  · Patient is scheduled for nuclear stress test in a    To rule out ischemia

## 2021-01-07 ENCOUNTER — APPOINTMENT (OUTPATIENT)
Dept: RADIOLOGY | Facility: HOSPITAL | Age: 66
End: 2021-01-07
Payer: COMMERCIAL

## 2021-01-07 ENCOUNTER — APPOINTMENT (OUTPATIENT)
Dept: NON INVASIVE DIAGNOSTICS | Facility: HOSPITAL | Age: 66
End: 2021-01-07
Payer: COMMERCIAL

## 2021-01-07 VITALS
DIASTOLIC BLOOD PRESSURE: 84 MMHG | HEIGHT: 73 IN | HEART RATE: 82 BPM | OXYGEN SATURATION: 96 % | SYSTOLIC BLOOD PRESSURE: 143 MMHG | BODY MASS INDEX: 37.98 KG/M2 | TEMPERATURE: 98.3 F | RESPIRATION RATE: 18 BRPM | WEIGHT: 286.6 LBS

## 2021-01-07 PROBLEM — I63.9 ACUTE ISCHEMIC STROKE (HCC): Status: ACTIVE | Noted: 2021-01-05

## 2021-01-07 PROCEDURE — 78452 HT MUSCLE IMAGE SPECT MULT: CPT | Performed by: INTERNAL MEDICINE

## 2021-01-07 PROCEDURE — 99214 OFFICE O/P EST MOD 30 MIN: CPT | Performed by: PSYCHIATRY & NEUROLOGY

## 2021-01-07 PROCEDURE — 93017 CV STRESS TEST TRACING ONLY: CPT

## 2021-01-07 PROCEDURE — 93016 CV STRESS TEST SUPVJ ONLY: CPT | Performed by: INTERNAL MEDICINE

## 2021-01-07 PROCEDURE — G1004 CDSM NDSC: HCPCS

## 2021-01-07 PROCEDURE — 70551 MRI BRAIN STEM W/O DYE: CPT

## 2021-01-07 PROCEDURE — 99214 OFFICE O/P EST MOD 30 MIN: CPT | Performed by: INTERNAL MEDICINE

## 2021-01-07 PROCEDURE — 99217 PR OBSERVATION CARE DISCHARGE MANAGEMENT: CPT | Performed by: INTERNAL MEDICINE

## 2021-01-07 PROCEDURE — 93018 CV STRESS TEST I&R ONLY: CPT | Performed by: INTERNAL MEDICINE

## 2021-01-07 PROCEDURE — 78452 HT MUSCLE IMAGE SPECT MULT: CPT

## 2021-01-07 PROCEDURE — A9502 TC99M TETROFOSMIN: HCPCS

## 2021-01-07 RX ORDER — METOPROLOL TARTRATE 50 MG/1
50 TABLET, FILM COATED ORAL EVERY 12 HOURS SCHEDULED
Qty: 60 TABLET | Refills: 0 | Status: SHIPPED | OUTPATIENT
Start: 2021-01-07 | End: 2021-01-19

## 2021-01-07 RX ORDER — ATORVASTATIN CALCIUM 80 MG/1
80 TABLET, FILM COATED ORAL
Qty: 30 TABLET | Refills: 0 | Status: SHIPPED | OUTPATIENT
Start: 2021-01-07 | End: 2021-04-15 | Stop reason: SDUPTHER

## 2021-01-07 RX ORDER — ATORVASTATIN CALCIUM 80 MG/1
80 TABLET, FILM COATED ORAL
Status: DISCONTINUED | OUTPATIENT
Start: 2021-01-07 | End: 2021-01-07 | Stop reason: HOSPADM

## 2021-01-07 RX ORDER — METOPROLOL TARTRATE 50 MG/1
50 TABLET, FILM COATED ORAL EVERY 12 HOURS SCHEDULED
Status: DISCONTINUED | OUTPATIENT
Start: 2021-01-07 | End: 2021-01-07 | Stop reason: HOSPADM

## 2021-01-07 RX ORDER — ASPIRIN 81 MG/1
81 TABLET, CHEWABLE ORAL DAILY
Qty: 30 TABLET | Refills: 0 | Status: SHIPPED | OUTPATIENT
Start: 2021-01-08

## 2021-01-07 RX ADMIN — APIXABAN 5 MG: 5 TABLET, FILM COATED ORAL at 11:40

## 2021-01-07 RX ADMIN — ASPIRIN 81 MG CHEWABLE TABLET 81 MG: 81 TABLET CHEWABLE at 11:40

## 2021-01-07 RX ADMIN — PANTOPRAZOLE SODIUM 40 MG: 40 TABLET, DELAYED RELEASE ORAL at 05:29

## 2021-01-07 RX ADMIN — REGADENOSON 0.4 MG: 0.08 INJECTION, SOLUTION INTRAVENOUS at 10:51

## 2021-01-07 RX ADMIN — METOPROLOL TARTRATE 50 MG: 50 TABLET, FILM COATED ORAL at 11:41

## 2021-01-07 NOTE — PLAN OF CARE
Problem: PAIN - ADULT  Goal: Verbalizes/displays adequate comfort level or baseline comfort level  Description: Interventions:  - Encourage patient to monitor pain and request assistance  - Assess pain using appropriate pain scale  - Administer analgesics based on type and severity of pain and evaluate response  - Implement non-pharmacological measures as appropriate and evaluate response  - Consider cultural and social influences on pain and pain management  - Notify physician/advanced practitioner if interventions unsuccessful or patient reports new pain  Outcome: Progressing     Problem: INFECTION - ADULT  Goal: Absence or prevention of progression during hospitalization  Description: INTERVENTIONS:  - Assess and monitor for signs and symptoms of infection  - Monitor lab/diagnostic results  - Monitor all insertion sites, i e  indwelling lines  - Administer medications as ordered  - Instruct and encourage patient and family to use good hand hygiene technique  Outcome: Progressing     Problem: SAFETY ADULT  Goal: Patient will remain free of falls  Description: INTERVENTIONS:  - Assess patient frequently for physical needs  -  Identify cognitive and physical deficits and behaviors that affect risk of falls    -  [place] fall precautions as indicated by assessment   - Educate patient/family on patient safety including physical limitations  - Instruct patient to call for assistance with activity based on assessment  - Modify environment to reduce risk of injury  Outcome: Progressing     Problem: DISCHARGE PLANNING  Goal: Discharge to home or other facility with appropriate resources  Description: INTERVENTIONS:  - Identify barriers to discharge w/patient   - Arrange for needed discharge resources and transportation as appropriate  - Identify discharge learning needs (meds  Outcome: Progressing     Problem: Knowledge Deficit  Goal: Patient/family/caregiver demonstrates understanding of disease process, treatment plan, medications, and discharge instructions  Description: Complete learning assessment and assess knowledge base  Interventions:  - Provide teaching at level of understanding  - Provide teaching via preferred learning methods  Outcome: Progressing     Problem: Neurological Deficit  Goal: Neurological status is stable or improving  Description: Interventions:  - Monitor and assess patient's level of consciousness, motor function, sensory function, and level of assistance needed for ADLs  - Monitor and report changes from baseline  Collaborate with interdisciplinary team to initiate plan and implement interventions as ordered  - Provide and maintain a safe environment  - Consider fall precautions  - Consider aspiration precautions  - Consider bleeding precautions  Outcome: Progressing     Problem: Communication Impairment  Goal: Ability to express needs and understand communication  Description: Assess patient's communication skills and ability to understand information  - Collaborate with case management/ for discharge needs  - Include patient/family/caregiver in decisions related to communication    Outcome: Progressing

## 2021-01-07 NOTE — DISCHARGE SUMMARY
Discharge- Jerre Claude 1955, 60 y o  male MRN: [id number]    Unit/Bed#: [address] [place] [address] Encounter: [id number]    Primary Care Provider: Edilia Gamble MD   Date and time admitted to hospital: 1/5/2021  5:45 PM        * Acute ischemic stroke [facility])  Assessment & Plan  Patient came home after work and had 2 beers  Wife reported left facial droop slurred speech  Patient's symptoms resolved in the ED  ·  CT of the head revealed a right distal M2/M3 branch occlusion  TPA was discussed but ultimately patient and wife declined  Admit to telemetry  · Patient currently on aspirin, Plavix and statin  · Lipid panel showed cholesterol 186,   · Hemoglobin A1c was 5 4  · MRI of the brain showed small acute cortical infarction in the right frontal operculum in the right MCA territory with associated trace amount internal petechial microhemorrhage  · 2D echo with bubble study showed EF of 60-65% with moderate concentric hypertrophy, moderate aortic stenosis  · Neurology consult appreciated  · Patient was initially on Lovenox and was transitioned to Eliquis 5 milligram p o  B i d  After discussion with Neurology  · Patient will also be discharged on baby aspirin along with high-dose statin due to M2/M3 division occlusion  · Patient is doing well with physical therapy    Atrial fibrillation [facility])  Assessment & Plan  Patient rate controlled atrial fibrillation in the emergency department, unclear onset  · Patient has known history of atrial fibrillation from 2015  Patient was on Coumadin anticoagulation previously and was transitioned to aspirin  · Patient's chads vascular score is 2 with an adjusted stroke rate of 2 2% per year  · 2D echo showed EF of 60-60% with moderate aortic stenosis  · Patient started on metoprolol 25 milligram p o  B i d   Verapamil was discontinued  Metoprolol dose increased to 50 milligram p o  B i d   · Discussed the risks and benefits of novel anticoagulants versus Coumadin  Patient agreeable to take Eliquis  · Patient was on Lovenox treatment dose during hospitalization lb transitioned to Eliquis 5 milligram p o  B i d   · Nuclear stress test showed no evidence of ischemia          Discharging Physician / Practitioner: Yasmin Mcelroy MD  PCP: Carmen Arndt MD  Admission Date:   Admission Orders (From admission, onward)     Ordered        01/05/21 1955  Place in Observation  Once                   Discharge Date: 01/07/21    Resolved Problems  Date Reviewed: 1/7/2021    None          Consultations During Hospital Stay:  · Cardiology    Procedures Performed:   · Nuclear stress test probably normal study with EF of 55%  · Echo showed EF of 60-60% with moderate concentric hypertrophy and moderate aortic stenosis       Outpatient Tests Requested:  · Follow-up with Cardiology in 2 weeks and Neurology in 6-8 weeks    Complications:  None    Reason for Admission:  Left-sided facial droop, slurred speech and drooling    Hospital Course:     Sally Mcelroy is a 60 y o  male patient with past medical history of hypertension, atrial fibrillation who originally presented to the hospital on 1/5/2021 due to left facial droop, garbled speech and drooling  CT scan of the brain showed right distal M2 M3 branch occlusion without any acute infarction  Patient also noted to be in atrial fibrillation  Patient was seen in consult with Cardiology Neurology  Patient's verapamil was changed to metoprolol which was up titrated  Patient also started on anticoagulation  Patient later underwent MRI of the brain which showed acute stroke  Patient to continue on baby aspirin along with high-dose statin  Heart rate was controlled during the hospital stay and patient was also seen by physical therapy and patient did not have any residual deficits  Patient was transitioned to Eliquis and will be discharged home on the same  Please see above list of diagnoses and related plan for additional information  Condition at Discharge: stable     Discharge Day Visit / Exam:     Subjective:  Patient denies any weakness, tingling or numbness speech problems, trouble swallowing, chest pain or palpitations  Vitals: Blood Pressure: 143/84 (01/07/21 1434)  Pulse: 82 (01/07/21 1434)  Temperature: 98 3 °F (36 8 °C) (01/07/21 1434)  Temp Source: Oral (01/07/21 0742)  Respirations: 18 (01/07/21 1434)  Height: 6' 1" (185 4 cm) (01/06/21 2005)  Weight - Scale: 130 kg (286 lb 9 6 oz) (01/06/21 2005)  SpO2: 96 % (01/07/21 1434)  Exam:   Physical Exam  Constitutional:       Appearance: Normal appearance  HENT:      Head: Normocephalic and atraumatic  Eyes:      Extraocular Movements: Extraocular movements intact  Pupils: Pupils are equal, round, and reactive to light  Neck:      Musculoskeletal: Normal range of motion and neck supple  Cardiovascular:      Rate and Rhythm: Normal rate  Heart sounds: Murmur present  No gallop  Comments: Irregularly irregular  Pulmonary:      Effort: Pulmonary effort is normal       Breath sounds: Normal breath sounds  Abdominal:      General: Bowel sounds are normal       Palpations: Abdomen is soft  Tenderness: There is no abdominal tenderness  Musculoskeletal: Normal range of motion  General: No swelling or deformity  Skin:     General: Skin is warm and dry  Neurological:      General: No focal deficit present  Mental Status: He is alert  Discussion with Family:  Wife Atif Miller    Discharge instructions/Information to patient and family:   See after visit summary for information provided to patient and family  Provisions for Follow-Up Care:  See after visit summary for information related to follow-up care and any pertinent home health orders  Disposition:     Home    Planned Readmission: No     Discharge Statement:  I spent 35 minutes discharging the patient  This time was spent on the day of discharge   I had direct contact with the patient on the day of discharge  Greater than 50% of the total time was spent examining patient, answering all patient questions, arranging and discussing plan of care with patient as well as directly providing post-discharge instructions  Additional time then spent on discharge activities  Discharge Medications:  See after visit summary for reconciled discharge medications provided to patient and family        ** Please Note: This note has been constructed using a voice recognition system **

## 2021-01-07 NOTE — NURSING NOTE
Discharge instructions were given to patient, no further questions at this time  Educated on new medication, Eliquis and signs of stroke  Patient left with all belongings at side

## 2021-01-07 NOTE — PLAN OF CARE
Problem: PAIN - ADULT  Goal: Verbalizes/displays adequate comfort level or baseline comfort level  Description: Interventions:  - Encourage patient to monitor pain and request assistance  - Assess pain using appropriate pain scale  - Administer analgesics based on type and severity of pain and evaluate response  - Implement non-pharmacological measures as appropriate and evaluate response  - Consider cultural and social influences on pain and pain management  - Notify physician/advanced practitioner if interventions unsuccessful or patient reports new pain  Outcome: Progressing     Problem: INFECTION - ADULT  Goal: Absence or prevention of progression during hospitalization  Description: INTERVENTIONS:  - Assess and monitor for signs and symptoms of infection  - Monitor lab/diagnostic results  - Monitor all insertion sites, i e  indwelling lines  - Administer medications as ordered  - Instruct and encourage patient and family to use good hand hygiene technique  Outcome: Progressing     Problem: SAFETY ADULT  Goal: Patient will remain free of falls  Description: INTERVENTIONS:  - Assess patient frequently for physical needs  -  Identify cognitive and physical deficits and behaviors that affect risk of falls    -  [place] fall precautions as indicated by assessment   - Educate patient/family on patient safety including physical limitations  - Instruct patient to call for assistance with activity based on assessment  - Modify environment to reduce risk of injury  Outcome: Progressing     Problem: DISCHARGE PLANNING  Goal: Discharge to home or other facility with appropriate resources  Description: INTERVENTIONS:  - Identify barriers to discharge w/patient   - Arrange for needed discharge resources and transportation as appropriate  - Identify discharge learning needs (meds  Outcome: Progressing     Problem: Knowledge Deficit  Goal: Patient/family/caregiver demonstrates understanding of disease process, treatment plan, medications, and discharge instructions  Description: Complete learning assessment and assess knowledge base  Interventions:  - Provide teaching at level of understanding  - Provide teaching via preferred learning methods  Outcome: Progressing     Problem: Neurological Deficit  Goal: Neurological status is stable or improving  Description: Interventions:  - Monitor and assess patient's level of consciousness, motor function, sensory function, and level of assistance needed for ADLs  - Monitor and report changes from baseline  Collaborate with interdisciplinary team to initiate plan and implement interventions as ordered  - Provide and maintain a safe environment  - Consider fall precautions  - Consider aspiration precautions  - Consider bleeding precautions  Outcome: Progressing     Problem: Communication Impairment  Goal: Ability to express needs and understand communication  Description: Assess patient's communication skills and ability to understand information  - Collaborate with case management/ for discharge needs  - Include patient/family/caregiver in decisions related to communication    Outcome: Progressing

## 2021-01-07 NOTE — DISCHARGE INSTRUCTIONS
Self Care Measures After a Stroke   WHAT YOU NEED TO KNOW:   Self-care measures are ways to help yourself manage the physical, mental, and emotional effects of a stroke  The effects of a stroke depend on where the stroke happened in your brain and how much damage occurred  You will learn self-care measures during stroke rehabilitation (rehab) sessions  Rehab is a program run by specialists who will help you recover abilities you may have lost  Specialists include physical, occupational, and speech therapists  They will help you develop a plan to care for yourself at home and at work  DISCHARGE INSTRUCTIONS:   Call your local emergency number (911 in the [address] [facility]) or have someone else call if:   · You have any of the following signs of a heart attack:      ? Squeezing, pressure, or pain in your chest    ? You may  also have any of the following:     ? Discomfort or pain in your back, neck, jaw, stomach, or arm    ? Shortness of breath    ? Nausea or vomiting    ? Lightheadedness or a sudden cold sweat    · You have any of the following signs of a stroke:      ? Numbness or drooping on one side of your face     ? Weakness in an arm or leg    ? Confusion or difficulty speaking    ? Dizziness, a severe headache, or vision loss       · You have trouble breathing  · You have a seizure  Call your doctor or neurologist if:   · You fall and hit your head or get injured  · You have a sore on your skin that does not go away  · You have new symptoms, or your symptoms are getting worse  · You have pain that does not go away, even after you take pain medicine  · You feel depressed or anxious  · You have questions or concerns about your condition or care  Manage physical effects: You may have balance or walking problems  You may have numb areas or trouble moving your arm or leg  You may have weak muscles, spasms, or contractures (muscles stay in one position)  · Take your medicines as directed  Medicines will help you manage pain and problems with movement  · Stretch and do range of motion exercises to prevent a contracture  Contractures limit movement of a joint, such as your wrist, elbow, shoulder, or ankle  Contractures can start to develop as soon as 1 week after your stroke  Your healthcare provider may show you or your caregiver how to stretch and do ROM exercises  · Prevent falls  Remove objects from walkways in and around your home  Install devices to prevent falls, such as handrails or a raised toilet seat  Use assistive devices when you walk, such as a cane or walker  Wear nonslip shoes or socks at all times  Manage mental effects: You may have trouble paying attention, thinking clearly, or remembering facts  You may lose track of the date or not know where you are  It may be hard to find a familiar object  · Calendars and planners  can help you remember events and important dates  A calendar on an electronic device can be programed to alert you about an upcoming event  You may want to use a paper calendar so you can cross off days or [place] important events  · A medicine organizer  can help you remember to take your medicine each day  Organizers can also blink or sound an alarm when it is time to take the medicine  Someone may need to help you program the organizer to remind you at certain times of the day  · Lists  can help you remember events or activities you need to do each day  Choose a kind of list that makes it easy for you to add new items and delete or cross off items that are done  It may help to put a list in a place you will see it often  An example is on the refrigerator  You may want to keep lists on a computer, tablet, smart phone, or other electronic device  · Timers  can help you remember to do a task, such as brushing your teeth  · Labels  can help you recognize items and remind you what they are for  Labels can include words or pictures      Manage emotional effects: You may have trouble controlling emotions after the stroke  This may be caused by damage to the brain  It may also be caused by the loss of body functions or independence  Depression is common after a stroke  You may also feel sad, irritable, or hopeless  You may have anger, frustration, or anxiety  Your moods may change quickly  You may laugh or cry, even if it is not the right emotion  · Therapy or counseling  may help you manage depression or anxiety after a stroke  Therapy or counseling may also help you control emotional outbursts or inappropriate emotions  Ask your healthcare provider how you can receive therapy or counseling  · Medicine  may be needed to treat depression, anxiety, or problems controlling your emotions  · Support groups  will allow you to talk about your feelings and concerns with others who have had a stroke  It may be helpful to hear from others who have gone through stroke rehab  You may learn ways to cope with your feelings and emotions  · Talk to your healthcare provider  if you have depression that continues or is getting worse  Your provider may be able to help treat your depression  It may also help to talk to friends and family members about how you are feeling  Tell your family and friends to let your healthcare provider know if they see any signs of depression:     ? Extreme sadness    ? Avoiding social interaction with family or friends    ? A lack of interest in things you once enjoyed    ? Irritability    ? Trouble sleeping    ? Low energy levels    ? A change in eating habits or sudden weight gain or loss    Manage bladder or bowel effects:  A stroke can make it hard to control when you urinate or have a bowel movement  You may feel like you have to urinate even when your bladder is empty  You may have constipation or trouble emptying your bladder    · Bladder and bowel movement training  helps you control when you urinate and plan for bowel movements  This training can help your skin stay clean and dry, and help prevent skin breakdown  Your program may include using the bathroom at certain times each day, even if you do not feel the need  · Medicines  can help prevent urine leakage  Medicines such as stool softeners can help prevent or treat constipation  Some medicines are available without a prescription  Manage energy and sleep problems: You may feel tired during the day  You may not have the energy to do activities or spend time with others  It may be hard to fall asleep at night or to stay asleep  · Talk to your healthcare provider about your medicines  Some medicines can make you feel tired or groggy  Your provider may make changes to your medicine or when you take it  · Work with healthcare providers to create an exercise plan  Exercise can help increase your energy level during the day  Exercise can also help you sleep better at night  A short nap during the day can also help you manage fatigue  · Schedule rest periods  You may need to rest after exercise or other activities  A short nap can help increase your energy  A nap can be for 15 to 30 minutes during the day  Do not nap for longer than 1 hour  Long naps can make it harder to fall asleep at night  · Create a sleep schedule  Go to bed and wake up at the same times each day  This will help you develop healthy sleep patterns  Do activities that help you relax before bed  Read a book, take a warm bath, or listen to music  · Limit liquids for 2 hours before bed  This helps prevent nighttime waking to empty your bladder  You may need to stop having caffeine in the afternoon if it affects your sleep  · Talk to your healthcare provider about sleep apnea  This is a condition that causes you to stop breathing for 10 seconds or more while you are sleeping  A stroke increase your risk for sleep apnea   Treatment can also lower your risk for another stroke  Recovery testing: Your healthcare provider will test your recovery 90 days (3 months) after your stroke  This may be done over the phone or in person  Your provider will ask how well you can do the activities you did before the stroke  He or she will also ask how well you can do your daily activities without help  Your provider may make recommendations for you based on your test  For example, you may need someone to help you walk safely  You may also need help with daily activities, such as getting dressed  Based on your answers, your provider may do this test again over time  For support and more information:   · National Stroke Association  [address] , [place] [address]  Phone: 2- 432 - [phone]  Web Address: [employer]    Follow up with your doctor or neurologist as directed:  Write down your questions so you remember to ask them during your visits  © Copyright [address] Information is for End User's use only and may not be sold, redistributed or otherwise used for commercial purposes  All illustrations and images included in CareNotes® are the copyrighted property of [employer]  or [facility] [employer]   The above information is an  only  It is not intended as medical advice for individual conditions or treatments  Talk to your doctor, nurse or pharmacist before following any medical regimen to see if it is safe and effective for you  Transient Ischemic Attack   WHAT YOU NEED TO KNOW:   A transient ischemic attack (TIA), or mini-stroke, happens when blood cannot flow to part of the brain  A TIA only lasts minutes to hours and does not cause lasting damage  It is still important to get immediate medical care  A TIA may be a warning that you are about to have an ischemic stroke  An ischemic stroke happens when blood flow to the brain is suddenly blocked, usually by a blood clot       DISCHARGE INSTRUCTIONS:   Call your local emergency number [phone] in the [address] [facility]) or have someone else call if:   · You have any of the following signs of a stroke:      ? Numbness or drooping on one side of your face     ? Weakness in an arm or leg    ? Confusion or difficulty speaking    ? Dizziness, a severe headache, or vision loss       · You have a seizure  · You have chest pain or shortness of breath  · You cough up blood  Seek care immediately if:   · Your arm or leg feels warm, tender, and painful  It may look swollen and red  · You have unusual or heavy bleeding  · You have a severe headache or feel dizzy  Call your doctor or neurologist if:   · Your blood pressure or blood sugar level is higher or lower than you were told it should be  · You have questions or concerns about your condition or care  Warning signs of a stroke: The words BE FAST can help you remember and recognize warning signs of a stroke:  · B = Balance:  Sudden loss of balance    · E = Eyes:  Loss of vision in one or both eyes    · F = Face:  Face droops on one side    · A = Arms:  Arm drops when both arms are raised    · S = Speech:  Speech is slurred or sounds different    · T = Time:  Time to get help immediately     Medicines: You may need any of the following:  · Antiplatelets , such as aspirin, help prevent blood clots  Take your antiplatelet medicine exactly as directed  These medicines make it more likely for you to bleed or bruise  If you are told to take aspirin, do not take acetaminophen or ibuprofen instead  · Blood thinners  help prevent blood clots  Clots can cause strokes, heart attacks, and death  The following are general safety guidelines to follow while you are taking a blood thinner:    ? Watch for bleeding and bruising while you take blood thinners  Watch for bleeding from your gums or nose  Watch for blood in your urine and bowel movements  Use a soft washcloth on your skin, and a soft toothbrush to brush your teeth   This can keep your skin and gums from bleeding  If you shave, use an electric shaver  Do not play contact sports  ? Tell your dentist and other healthcare providers that you take a blood thinner  Wear a bracelet or necklace that says you take this medicine  ? Do not start or stop any other medicines unless your healthcare provider tells you to  Many medicines cannot be used with blood thinners  ? Take your blood thinner exactly as prescribed by your healthcare provider  Do not skip does or take less than prescribed  Tell your provider right away if you forget to take your blood thinner, or if you take too much  ? Warfarin  is a blood thinner that you may need to take  The following are things you should be aware of if you take warfarin:     § Foods and medicines can affect the amount of warfarin in your blood  Do not make major changes to your diet while you take warfarin  Warfarin works best when you eat about the same amount of vitamin K every day  Vitamin K is found in green leafy vegetables and certain other foods  Ask for more information about what to eat when you are taking warfarin  § You will need to see your healthcare provider for follow-up visits when you are on warfarin  You will need regular blood tests  These tests are used to decide how much medicine you need  · Other medicines  may be needed to treat diabetes, depression, high cholesterol, or blood pressure problems  You may also need medicine to decrease the pressure in your brain, reduce pain, or prevent seizures  · Take your medicine as directed  Contact your healthcare provider if you think your medicine is not helping or if you have side effects  Tell him of her if you are allergic to any medicine  Keep a list of the medicines, vitamins, and herbs you take  Include the amounts, and when and why you take them  Bring the list or the pill bottles to follow-up visits  Carry your medicine list with you in case of an emergency      What you can do to prevent another TIA or a stroke:   · Manage health conditions  A condition such as diabetes can increase your risk for a stroke  Control your blood sugar level if you have hyperglycemia or diabetes  Take your prescribed medicines and check your blood sugar level as directed  · Check your blood pressure as directed  High blood pressure can increase your risk for a stroke  If you have high blood pressure, follow your healthcare provider's directions for controlling your blood pressure  · Do not use nicotine products or illegal drugs  Nicotine and other chemicals in cigarettes and cigars can cause blood vessel damage  Nicotine and illegal drugs both increase your risk for a stroke  Ask your healthcare provider for information if you currently smoke or use drugs and need help to quit  E- cigarettes or smokeless tobacco still contain nicotine  Talk to your healthcare provider before you use these products  · Talk to your healthcare provider about alcohol  Alcohol can raise your blood pressure  The recommended limit is 2 drinks in a day for men and 1 drink in a day for women  Do not binge drink or save a week's worth of alcohol to drink in 1 or 2 days  Limit weekly amounts as directed by your provider  · Eat a variety of healthy foods  Healthy foods include whole-grain breads, low-fat dairy products, beans, lean meats, and fish  Eat at least 5 servings of fruits and vegetables each day  Choose foods that are low in fat, cholesterol, salt, and sugar  Eat foods that are high in potassium, such as potatoes and bananas  A dietitian can help you create healthy meal plans  · Maintain a healthy weight  Ask your healthcare provider how much you should weigh  Ask him or her to help you create a weight loss plan if you are overweight  He or she can help you create small goals if you have a lot of weight to lose  · Exercise as directed    Exercise can lower your blood pressure, cholesterol, weight, and blood sugar levels  Healthcare providers will help you create exercise goals  They can also help you make a plan to reach your goals  For example, you can break exercise into 10 minute periods, 3 times in the day  Find an exercise that you enjoy  This will make it easier for you to reach your exercise goals  · Manage stress  Stress can raise your blood pressure  Find ways to relax, such as deep breathing or listening to music  Follow up with your doctor or neurologist in 1 to 2 days:  Write down your questions so you remember to ask them during your visits  © Copyright [address] Information is for End User's use only and may not be sold, redistributed or otherwise used for commercial purposes  All illustrations and images included in CareNotes® are the copyrighted property of [employer] M , Inc  or [address] [place], [place] [address]   The above information is an  only  It is not intended as medical advice for individual conditions or treatments  Talk to your doctor, nurse or pharmacist before following any medical regimen to see if it is safe and effective for you  Apixaban (By mouth)   Apixaban (a-PIX-a-ban)  Treats and prevents blood clots  This medicine is a blood thinner  Brand Name(s): Eliquis, Eliquis 30 Day DVT/PE Starter Pack   There may be other brand names for this medicine  When This Medicine Should Not Be Used: This medicine is not right for everyone  Do not use it if you had an allergic reaction to apixaban or you have active bleeding  How to Use This Medicine:   Tablet  · Your doctor will tell you how much medicine to use  Do not use more than directed  · If you are not able to swallow the tablets whole, they may be crushed and mixed in water, 5% dextrose in water (D5W), apple juice, or applesauce  The crushed tablets may be mixed with 60 mL of water or D5W dose and given through a nasogastric tube (NGT)  · This medicine should come with a Medication Guide   Ask your pharmacist for a copy if you do not have one  · Missed dose: Take a dose as soon as you remember  If it is almost time for your next dose, wait until then and take a regular dose  Do not take extra medicine to make up for a missed dose  · Store the medicine in a closed container at room temperature, away from heat, moisture, and direct light  Drugs and Foods to Avoid:   Ask your doctor or pharmacist before using any other medicine, including over-the-counter medicines, vitamins, and herbal products  · Some medicines can affect how apixaban works  Tell your doctor if you are using any of the following:   ? Carbamazepine, itraconazole, ketoconazole, phenytoin, rifampin, ritonavir, Yunior's wort  ? Blood thinner (including clopidogrel, heparin, prasugrel, warfarin)  ? Medicine to treat depression  ? NSAID pain or arthritis medicine (including aspirin, celecoxib, diclofenac, ibuprofen, naproxen)  Warnings While Using This Medicine:   · Tell your doctor if you are pregnant or breastfeeding, or if you have kidney disease, liver disease, bleeding problems, antiphospholipid syndrome, or an artificial heart valve  · Do not stop using this medicine suddenly without asking your doctor  You might have a higher risk of stroke for a short time after you stop using this medicine  · This medicine increases your risk for bleeding that can become serious if not controlled  You may also bruise easily, and it may take longer than usual for bleeding to stop  · This medicine may increase your risk for a hematoma (blood clot) in your spine or back if you undergo an epidural or spinal puncture  This could lead to paralysis  Tell your doctor if you ever had spine problems or back surgery  · Tell any doctor or dentist who treats you that you are using this medicine  With your doctor's supervision, you may need to stop using this medicine several days before you have surgery or medical tests    · Your doctor will do lab tests at regular visits to check on the effects of this medicine  Keep all appointments  · Keep all medicine out of the reach of children  Never share your medicine with anyone  Possible Side Effects While Using This Medicine:   Call your doctor right away if you notice any of these side effects:  · Allergic reaction: Itching or hives, swelling in your face or hands, swelling or tingling in your mouth or throat, chest tightness, trouble breathing  · Change in how much or how often you urinate, red or pink urine  · Chest pain, trouble breathing  · Coughing up blood, vomiting blood or material that looks like coffee grounds  · Numbness, tingling, or muscle weakness in your legs or feet  · Red or black, tarry stools  · Unusual bleeding, bruising, or weakness  If you notice other side effects that you think are caused by this medicine, tell your doctor  Call your doctor for medical advice about side effects  You may report side effects to FDA at 0-987-FDA-6734  © Copyright [address] [place] [address] Drive Information is for End User's use only and may not be sold, redistributed or otherwise used for commercial purposes  The above information is an  only  It is not intended as medical advice for individual conditions or treatments  Talk to your doctor, nurse or pharmacist before following any medical regimen to see if it is safe and effective for you  Metoprolol (By mouth)   Metoprolol (met-oh-PROE-lol)  Treats high blood pressure, angina (chest pain), and heart failure  May lower the risk of death after a heart attack  This medicine is a beta-blocker  Brand Name(s): Kapspargo Sprinkle, Lopressor, Metoprolol Succinate AvPak, Toprol XL   There may be other brand names for this medicine  When This Medicine Should Not Be Used: This medicine is not right for everyone  Do not use if you had an allergic reaction to metoprolol or similar medicines, or if you have certain blood circulation or heart problems   Ask your doctor about these problems  How to Use This Medicine:   Long Acting Capsule, Tablet, Long Acting Tablet  · Take your medicine as directed  Your dose may need to be changed several times to find what works best for you  · Take this medicine with a meal or right after a meal  Take this medicine the same way every day, at the same time  · Swallow the extended-release capsule whole  Do not crush, break, or chew it  If you cannot swallow the extended-release capsule:   ? You may open it and sprinkle the contents over a small amount (teaspoonful) of soft food (including applesauce, pudding, or yogurt)  Swallow the mixture within 60 minutes  Do not store for later use   ? You may also use a nasogastric tube to give the medicine  Mix the contents of the opened capsule with water into a syringe  Gently shake the mixture for about 10 seconds, then flush it through the tube  Rinse the tube with water until all of the medicine is washed out  · Swallow the tablet whole with a glass of water  You may break the extended-release tablet in half, but do not chew or crush it  · Missed dose: Take a dose as soon as you remember  If it is almost time for your next dose, wait until then and take a regular dose  Do not take extra medicine to make up for a missed dose  · Store the medicine in a closed container at room temperature, away from heat, moisture, and direct light  Drugs and Foods to Avoid:   Ask your doctor or pharmacist before using any other medicine, including over-the-counter medicines, vitamins, and herbal products  · Some medicines can affect how metoprolol works  Tell your doctor if you are using any of the following:   ? Clonidine, digoxin, dipyridamole, guanethidine, hydralazine, hydroxychloroquine, methyldopa, prazosin, quinidine, reserpine  ? Calcium channel blocker (including amlodipine, diltiazem, verapamil)  ? Ergot medicine  ?  MAO inhibitor or medicine to treat depression (including bupropion, clomipramine, desipramine, fluoxetine, fluvoxamine, paroxetine, sertraline)  ? Medicine for heart rhythm problems (including propafenone)  ? Medicine to treat HIV/AIDS (including ritonavir)  ? Medicine to treat infection (including terbinafine)  ? Medicine to treat mental illness (including chlorpromazine, fluphenazine, haloperidol, thioridazine)  · Do not drink alcohol while you are using this medicine  Warnings While Using This Medicine:   · Tell your doctor if you are pregnant or breastfeeding, or if you have blood vessel, heart, or circulation problems (including heart failure, rhythm problems, or a slow heartbeat)  Tell your doctor if you have kidney disease, liver disease, diabetes, lung disease (including asthma), thyroid problems, pheochromocytoma (adrenal gland tumor), or a history of allergies  · This medicine may worsen the symptoms of heart failure while the dose is being adjusted  · Do not stop using this medicine suddenly  Your doctor will need to slowly decrease your dose before you stop it completely  · Tell any doctor or dentist who treats you that you are using this medicine  You may need to stop using this medicine several days before you have surgery or medical tests  · This medicine could lower your blood pressure too much, especially when you first use it or if you are dehydrated  Stand or sit up slowly if you feel lightheaded or dizzy  · This medicine may make you dizzy or drowsy  Do not drive or do anything else that could be dangerous until you know how this medicine affects you  · Your doctor will do lab tests at regular visits to check on the effects of this medicine  Keep all appointments  · Keep all medicine out of the reach of children  Never share your medicine with anyone    Possible Side Effects While Using This Medicine:   Call your doctor right away if you notice any of these side effects:  · Allergic reaction: Itching or hives, swelling in your face or hands, swelling or tingling in your mouth or throat, chest tightness, trouble breathing  · Lightheadedness, dizziness, or fainting  · Slow heartbeat  · Swelling in your hands, ankles, or feet, trouble breathing  · Worsening chest pain  If you notice these less serious side effects, talk with your doctor:   · Diarrhea  · Mild dizziness, tiredness  If you notice other side effects that you think are caused by this medicine, tell your doctor  Call your doctor for medical advice about side effects  You may report side effects to FDA at 1-931-FDA-1166  © Copyright [address] [place] [address] Drive Information is for End User's use only and may not be sold, redistributed or otherwise used for commercial purposes  The above information is an  only  It is not intended as medical advice for individual conditions or treatments  Talk to your doctor, nurse or pharmacist before following any medical regimen to see if it is safe and effective for you

## 2021-01-07 NOTE — UTILIZATION REVIEW
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   He need to be on antithrombotic therapy  Creed Ovens has refused tPA in the ED  Patient is now started on Eliquis after Neurology cleared him  Is willing to be compliant with now  2  Atrial fibrillation probably chronic   He had EKG from 2015 he was in atrial fibrillation   He is asymptomatic continue rate control strategy he has a normal LV systolic function by recent echo  Echo report disease chart  Continue antithrombotic therapy  3  Cardiac murmur   Moderate aortic stenosis with mitral annulus calcification  With normal LV systolic function  4  Essential hypertension   Has moderate LVH  Blood pressure has been acceptable  5  Obesity with BMI around 37  6  Small ascending aortic aneurysm of 4 1 cm by CT  7  Dyslipidemia  LDL still not adequately controlled  Continue high intensity statin  LFTs are acceptable  Metoprolol increased  Nuclear stress test shows no ischemia EF around 50-55%  Continue increased dose of metoprolol continue other medications  Continue statins  Continue Eliquis  Patient advised to follow up with Cardiology  No further cardiac workup planned at this time  Per neuro:  1  Questionable CVA of M to territory-MRI remains pending  2  Atrial fibrillation-not being treated with anticoagulation  3  Hypertension  4  Hyperlipidemia-not on statin prior to arrival   -monitor on telemetry  -neuro assessments per stroke pathway  -full-dose aspirin given in the ER  -continue baby aspirin 81 mg daily-continue on DC  -Lipitor 80 mg daily  -DC plavix and Lovenox  -Start Eliquis 5 mg b i d   When cleared by Neurology  -blood pressure management per Cardiology  -MRI of the brain pending  -echocardiogram completed  -PT/OT  -speech therapy  -cardiology planning nuclear stress test in a m  Pertinent Labs/Diagnostic Results:   1/7:  MRI brain=  1  Small acute cortical infarction in the right frontal operculum, in the right MCA territory  Questionable trace amount of associated internal petechial microhemorrhage in this region of recent/acute infarction  No significant mass effect      Results from last 7 days   Lab Units 01/06/21  0831 01/05/21  1758   WBC Thousand/uL 7 35 7 89   HEMOGLOBIN g/dL 16 4 15 5   HEMATOCRIT % 48 4 45 2   PLATELETS Thousands/uL 185 182   NEUTROS ABS Thousands/µL  --  4 42     Results from last 7 days   Lab Units 01/06/21  0831 01/05/21  1758   SODIUM mmol/L 137 135*   POTASSIUM mmol/L 3 8 4 1   CHLORIDE mmol/L 103 100   CO2 mmol/L 26 28   ANION GAP mmol/L 8 7   BUN mg/dL 17 21   CREATININE mg/dL 0 91 0 81   EGFR ml/min/1 73sq m 89 94   CALCIUM mg/dL 8 8 8 4   MAGNESIUM mg/dL 2 1  --      Results from last 7 days   Lab Units 01/06/21  0831 01/05/21  1758   AST U/L 35 33   ALT U/L 69 58   ALK PHOS U/L 63 54   TOTAL PROTEIN g/dL 8 1 7 3   ALBUMIN g/dL 3 7 3 4*   TOTAL BILIRUBIN mg/dL 0 70 0 20     Results from last 7 days   Lab Units 01/05/21  1734   POC GLUCOSE mg/dl 113     Results from last 7 days   Lab Units 01/06/21  0831 01/05/21  1758   GLUCOSE RANDOM mg/dL 102 96     Results from last 7 days   Lab Units 01/06/21  0831   HEMOGLOBIN A1C % 5 4   EAG mg/dl 108     Results from last 7 days   Lab Units 01/05/21  1758   TROPONIN I ng/mL <0 02     Results from last 7 days   Lab Units 01/05/21  1758   PROTIME seconds 13 8   INR  1 06   PTT seconds 28     Results from last 7 days   Lab Units 01/05/21  1817   CLARITY UA  Clear   COLOR UA  Colorless   SPEC GRAV UA  <=1 005   PH UA  6 0   GLUCOSE UA mg/dl Negative   KETONES UA mg/dl Negative   BLOOD UA  Trace-Intact*   PROTEIN UA mg/dl Negative   NITRITE UA  Negative   BILIRUBIN UA  Negative   UROBILINOGEN UA E U /dl 0 2   LEUKOCYTES UA  Negative   WBC UA /hpf None Seen   RBC UA /hpf 0-1   BACTERIA UA /hpf Occasional   EPITHELIAL CELLS WET PREP /hpf Occasional     Vital Signs: /95   Pulse 76   Temp 97 8 °F (36 6 °C) (Oral)   Resp 19   Ht 6' 1" (1 854 m)   Wt 130 kg (286 lb 9 6 oz)   SpO2 93%   BMI 37 81 kg/m²     Medications:  apixaban, 5 mg, Oral, BID  aspirin, 81 mg, Oral, Daily  atorvastatin, 80 mg, Oral, Daily With Dinner  metoprolol tartrate, 50 mg, Oral, Q12H FLOYD  pantoprazole, 40 mg, Oral, Early Morning    Discharge Plan: tbd    Network Utilization Review Department  ATTENTION: Please call with any questions or concerns to [phone] and carefully listen to the prompts so that you are directed to the right person  All voicemails are confidential   David Hallmark all requests for admission clinical reviews, approved or denied determinations and any other requests to dedicated fax number below belonging to the campus where the patient is receiving treatment   List of dedicated fax numbers for the Facilities:  [address] DENIALS (Administrative/Medical Necessity) [phone]   [address] (Maternity/NICU/Pediatrics) [phone]   [address] Colton Jennigns 0625 (  [facility]) [address] [address] Haley Montalvo 14[address] [place] [address] [phone]

## 2021-01-07 NOTE — PROGRESS NOTES
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:  Case discussed with neurology patient is cleared to start Eliquis 5 mg b i d     Will discontinue Plavix  2  Chronic atrial fibrillation:  Rates are controlled on current dose of Lopressor  CHADS2 Vasc score = 3  Patient restarted on Eliquis     3  Hypertension:  Stable    4  Obesity      Subjective / Objective:   Patient seen and examined  He had his MRI of the brain this morning which demonstrated a small acute cortical infarct in right frontal area in the right MCA territory  Small amount of petechial microhemorrhage in area of recent infarct  Vitals: Blood pressure 138/90, pulse 74, temperature 97 8 °F (36 6 °C), resp  rate 18, height 6' 1" (1 854 m), weight 130 kg (286 lb 9 6 oz), SpO2 93 %  Vitals:    21 2250 21   Weight: 130 kg (286 lb 9 6 oz) 130 kg (286 lb 9 6 oz)     Body mass index is 37 81 kg/m²  BP Readings from Last 3 Encounters:   21 138/90   20 120/70   18 120/80     Orthostatic Blood Pressures      Most Recent Value   Blood Pressure  138/90 filed at 2021 0742   Patient Position - Orthostatic VS  Sitting filed at 2021 2215        I/O       701 -  07 -  0700  07 -  0700    P  O  300      I V  (mL/kg) 10 (0 1)      IV Piggyback 1000      Total Intake(mL/kg) 1310 (10 1)      Urine (mL/kg/hr) 200      Total Output 200      Net +1110             Unmeasured Urine Occurrence 1 x          Invasive Devices     Peripheral Intravenous Line            Peripheral IV 21 Right Antecubital 1 day                No intake or output data in the 24 hours ending 21 1010      Physical Exam:   Physical Exam  Vitals signs and nursing note reviewed  Constitutional:       General: He is not in acute distress  Appearance: Normal appearance  He is well-developed  He is obese  HENT:      Head: Normocephalic  Right Ear: External ear normal       Left Ear: External ear normal       Nose: Nose normal    Eyes:      General: No scleral icterus  Right eye: No discharge  Left eye: No discharge  Conjunctiva/sclera: Conjunctivae normal       Pupils: Pupils are equal, round, and reactive to light  Neck:      Musculoskeletal: Normal range of motion and neck supple  Thyroid: No thyromegaly  Cardiovascular:      Rate and Rhythm: Normal rate  Rhythm irregularly irregular  Pulmonary:      Effort: Pulmonary effort is normal  No respiratory distress  Breath sounds: Normal breath sounds  No wheezing, rhonchi or rales  Abdominal:      General: Bowel sounds are normal  There is no distension  Palpations: Abdomen is soft  Musculoskeletal:      Right lower leg: No edema  Left lower leg: No edema  Skin:     General: Skin is warm and dry  Capillary Refill: Capillary refill takes less than 2 seconds  Neurological:      General: No focal deficit present  Mental Status: He is alert and oriented to person, place, and time  Mental status is at baseline  Psychiatric:         Mood and Affect: Mood normal          Behavior: Behavior normal          Thought Content:  Thought content normal          Judgment: Judgment normal                    Medications/ Allergies:     Current Facility-Administered Medications   Medication Dose Route Frequency Provider Last Rate    apixaban  5 mg Oral BID HAILEE Cuello      aspirin  81 mg Oral Daily HAILEE Valladares      atorvastatin  40 mg Oral Daily With [employer]HAILEE      metoprolol tartrate  50 mg Oral Q12H [address] Pat Blanco MD      pantoprazole  40 mg Oral Early Morning HAILEE Cuello          No Known Allergies    VTE Pharmacologic Prophylaxis:   Sequential compression device (Venodyne)     Labs:   Troponins:  Results from last 7 days   Lab Units 01/05/21  1758   TROPONIN I ng/mL <0 02     CBC with diff:  Results from last 7 days   Lab Units 01/06/21  0831 01/05/21  1758   WBC Thousand/uL 7 35 7 89   HEMOGLOBIN g/dL 16 4 15 5   HEMATOCRIT % 48 4 45 2   MCV fL 90 90   PLATELETS Thousands/uL 185 182   MCH pg 30 3 30 7   MCHC g/dL 33 9 34 3   RDW % 13 3 13 2   MPV fL 10 6 10 6     CMP:  Results from last 7 days   Lab Units 01/06/21  0831 01/05/21  1758   SODIUM mmol/L 137 135*   POTASSIUM mmol/L 3 8 4 1   CHLORIDE mmol/L 103 100   CO2 mmol/L 26 28   ANION GAP mmol/L 8 7   BUN mg/dL 17 21   CREATININE mg/dL 0 91 0 81   CALCIUM mg/dL 8 8 8 4   AST U/L 35 33   ALT U/L 69 58   ALK PHOS U/L 63 54   TOTAL PROTEIN g/dL 8 1 7 3   ALBUMIN g/dL 3 7 3 4*   TOTAL BILIRUBIN mg/dL 0 70 0 20   EGFR ml/min/1 73sq m 89 94     Magnesium:  Results from last 7 days   Lab Units 01/06/21  0831   MAGNESIUM mg/dL 2 1     Coags:  Results from last 7 days   Lab Units 01/05/21  1758   PTT seconds 28   INR  1 06     Lipid Profile:  Results from last 7 days   Lab Units 01/06/21  0831   CHOLESTEROL mg/dL 186   TRIGLYCERIDES mg/dL 94   HDL mg/dL 38*   LDL CALC mg/dL 129*     Hgb A1c:  Results from last 7 days   Lab Units 01/06/21  0831   HEMOGLOBIN A1C % 5 4       Imaging & Testing   I have personally reviewed pertinent reports  Cta Head And Neck With And Without Contrast    Result Date: 1/5/2021  Narrative: CTA NECK AND BRAIN WITH AND WITHOUT CONTRAST INDICATION: Neuro deficit, acute, stroke suspected Stroke alert COMPARISON:   None  TECHNIQUE:  Routine CT imaging of the Brain without contrast   Post contrast imaging was performed after administration of iodinated contrast through the neck and brain  Post contrast axial 0 625 mm images timed to opacify the arterial system  3D rendering was performed on an independent workstation  MIP reconstructions performed   Coronal reconstructions were performed of the noncontrast portion of the brain  Radiation dose length product (DLP) for this visit:  778 mGy-cm   This examination, like all CT scans performed in the [facility], was performed utilizing techniques to minimize radiation dose exposure, including the use of iterative reconstruction and automated exposure control  IV Contrast:  85 mL of iohexol (OMNIPAQUE)  IMAGE QUALITY:   Diagnostic FINDINGS: NONCONTRAST BRAIN PARENCHYMA:  No intracranial mass, mass effect or midline shift  No CT signs of acute infarction  No acute parenchymal hemorrhage  VENTRICLES AND EXTRA-AXIAL SPACES:  Normal for the patient's age  VISUALIZED ORBITS AND PARANASAL SINUSES:  Unremarkable  CERVICAL VASCULATURE AORTIC ARCH AND GREAT VESSELS:  Normal aortic arch and great vessel origins  Normal visualized subclavian vessels  RIGHT VERTEBRAL ARTERY CERVICAL SEGMENT:  Normal origin  The vessel is normal in caliber throughout the neck  LEFT VERTEBRAL ARTERY CERVICAL SEGMENT:  Normal origin  The vessel is normal in caliber throughout the neck  RIGHT EXTRACRANIAL CAROTID SEGMENT:  Normal caliber common carotid artery  Normal bifurcation and cervical internal carotid artery  No stenosis or dissection  LEFT EXTRACRANIAL CAROTID SEGMENT:  Normal caliber common carotid artery  Normal bifurcation and cervical internal carotid artery  No stenosis or dissection  NASCET criteria was used to determine the degree of internal carotid artery diameter stenosis  INTRACRANIAL VASCULATURE INTERNAL CAROTID ARTERIES:  Normal enhancement of the intracranial portions of the internal carotid arteries  Normal ophthalmic artery origins  Normal ICA terminus  ANTERIOR CIRCULATION:  Symmetric A1 segments and anterior cerebral arteries with normal enhancement  Normal anterior communicating artery  MIDDLE CEREBRAL ARTERY CIRCULATION:  Right distal M2/M3 branch occlusion   DISTAL VERTEBRAL ARTERIES:  Normal distal vertebral arteries  Posterior inferior cerebellar artery origins are normal  Normal vertebral basilar junction  BASILAR ARTERY:  Basilar artery is normal in caliber  Normal superior cerebellar arteries  POSTERIOR CEREBRAL ARTERIES: Both posterior cerebral arteries arises from the basilar tip  Both arteries demonstrate normal enhancement  Normal posterior communicating arteries  DURAL VENOUS SINUSES:  Normal  NON VASCULAR ANATOMY BONY STRUCTURES:  No acute osseous abnormality  SOFT TISSUES OF THE NECK:  Normal  THORACIC INLET:  Negative for Covid 19  Mild cardiomegaly  Impression: Right distal M2/M3 branch occlusion  Cardiomegaly  I personally discussed this study with DR BOSWELL NEUROLOGY on 1/5/2021 at 6:11 PM  Workstation performed: [id number]     Mri Brain Wo Contrast    Result Date: 1/7/2021  Narrative: MRI BRAIN WITHOUT CONTRAST INDICATION: Follow-up evaluation, status post stroke alert  Left facial droop and slurred speech      History of right M2 3 branch occlusion  COMPARISON:   1/5/2021 TECHNIQUE:  Sagittal T1, axial T2, axial FLAIR, axial T1, axial [place] and axial diffusion imaging  IMAGE QUALITY:  Diagnostic  FINDINGS: BRAIN PARENCHYMA:  There is a small band of patchy diffusion restriction in the right frontal opercular cortex and adjacent cortex of the lateral aspect of the right frontal lobe indicative of a small amount of acute cortical infarction in the right MCA territory  There is minimal local sulcal effacement and associated FLAIR hyperintensity  No subfalcine herniation  A trace amount of blooming artifact on image 58, series 7A trace amount of petechial hemorrhage in this region of acute infarction  No extra-axial fluid collection  Cerebellar tonsils normally positioned  There are no white matter changes in the cerebral hemispheres  VENTRICLES:  Normal for the patient's age   SELLA AND PITUITARY GLAND:  Normal  ORBITS:  Normal  PARANASAL SINUSES:  Air-fluid level in left maxillary sinus noted  VASCULATURE:  Evaluation of the major intracranial vasculature demonstrates appropriate flow voids  CALVARIUM AND SKULL BASE:  Normal  EXTRACRANIAL SOFT TISSUES:  In the midline of the nasopharynx there is a rounded isointense structure 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     Impression: 1  Small acute cortical infarction in the right frontal operculum, in the right MCA territory  Questionable trace amount of associated internal petechial microhemorrhage in this region of recent/acute infarction  No significant mass effect  Workstation performed: [id number]     Ct Stroke Alert Brain    Result Date: 1/5/2021  Narrative: CT BRAIN - STROKE ALERT PROTOCOL INDICATION:   Neuro deficit, acute, stroke suspected Stroke  COMPARISON:  None  TECHNIQUE:  CT examination of the brain was performed  In addition to axial images, coronal reformatted images were created and submitted for interpretation  Radiation dose length product (DLP) for this visit:  924 mGy-cm   This examination, like all CT scans performed in the [facility], was performed utilizing techniques to minimize radiation dose exposure, including the use of iterative reconstruction and automated exposure control  IMAGE QUALITY:  Diagnostic  FINDINGS:  PARENCHYMA:  Decreased attenuation is noted in the supratentorial white matter demonstrating an appearance most consistent with mild microangiopathic change  No intracranial mass, mass effect or midline shift  No acute intracranial hemorrhage  No CT signs of acute infarction  VENTRICLES AND EXTRA-AXIAL SPACES:  Normal for patient's age  VISUALIZED ORBITS AND PARANASAL SINUSES:  Orbits appear normal   Mild scattered sinus mucosal thickening is noted  No fluid levels are seen  CALVARIUM AND EXTRACRANIAL SOFT TISSUES:   Normal      Impression: No acute intracranial abnormality  Microangiopathic changes   Findings were directly discussed with Marcos Nguyen on 1/5/2021 5:49 PM  Workstation performed: [id number]        EKG / Monitor: Personally reviewed  Atrial fibrillation    Cardiac testing:   Results for orders placed during the hospital encounter of 21   Echo complete with contrast if indicated    Narrative [address]  [address] [facility]  Esha Morgan [address]  [phone]    Transthoracic Echocardiogram  2D, M-mode, Doppler, and Color Doppler    Study date:  2021    Patient: Claudio Webb  MR number: [id number]  Account number: [de-identified]  : 1955  Age: 60 years  Gender: Male  Status: Outpatient  Location: Bedside  Height: 71 in  Weight: 285 3 lb  BP: 139/ 93 mmHg    Indications: TIA    Diagnoses: G45 9 - Transient cerebral ischemic attack, unspecified    Sonographer:  KELVIN Urias  Primary Physician:  Misty Ludwig MD  Referring Physician:  HAILEE Adame  Group:  [facility]  Interpreting Physician:  Britt Carrillo MD    SUMMARY    PROCEDURE INFORMATION:  This was a technically difficult study  LEFT VENTRICLE:  Systolic function was normal  Ejection fraction was estimated in the range of 60 % to 65 % to be 65 %  Although no diagnostic regional wall motion abnormality was identified, this possibility cannot be completely excluded on the basis of this study  Wall thickness was moderately increased  There was moderate concentric hypertrophy  LEFT ATRIUM:  The atrium was mildly to moderately dilated  ATRIAL SEPTUM:  No good quality bubble study was recorded    RIGHT ATRIUM:  The atrium was mildly to moderately dilated  MITRAL VALVE:  There was mild to moderate annular calcification  There was mild regurgitation  AORTIC VALVE:  The valve was trileaflet  Leaflets exhibited moderately increased thickness, moderate calcification, and moderately reduced cuspal separation  Transaortic velocity was increased due to valvular stenosis  There was moderate stenosis   ROCÍO 1 1 cm2, mean gradient 20 mm of hg and peak around 40 mm of hg  There was mild to moderate regurgitation  TRICUSPID VALVE:  There was trace regurgitation  The tricuspid jet envelope definition was inadequate for estimation of RV systolic pressure  AORTA:  The root exhibited upper normal normal size  Size around 4 cm    HISTORY: PRIOR HISTORY: TIA,A Fib ,HTN,Valvular heart disease  PROCEDURE: The procedure was performed at the bedside  This was a routine study  The transthoracic approach was used  The study included complete 2D imaging, M-mode, complete spectral Doppler, and color Doppler  The heart rate was 107 bpm,  at the start of the study  Images were obtained from the parasternal, apical, subcostal, and suprasternal notch acoustic windows  Intravenous contrast (agitated saline) was administered to evaluate possible R-L intracardiac shunting  This  was a technically difficult study  LEFT VENTRICLE: Size was normal  Systolic function was normal  Ejection fraction was estimated in the range of 60 % to 65 % to be 65 %  Although no diagnostic regional wall motion abnormality was identified, this possibility cannot be  completely excluded on the basis of this study  Wall thickness was moderately increased  There was moderate concentric hypertrophy  DOPPLER: The study was not technically sufficient to allow evaluation of LV diastolic function, due to  atrial fibrillation  RIGHT VENTRICLE: The size was normal  Systolic function was normal     LEFT ATRIUM: The atrium was mildly to moderately dilated  ATRIAL SEPTUM: No good quality bubble study was recorded    RIGHT ATRIUM: The atrium was mildly to moderately dilated  MITRAL VALVE: There was mild to moderate annular calcification  There was mild calcification  There was normal leaflet separation  DOPPLER: The transmitral velocity was within the normal range  There was no evidence for stenosis  There was  mild regurgitation  AORTIC VALVE: The valve was trileaflet  Leaflets exhibited moderately increased thickness, moderate calcification, and moderately reduced cuspal separation  DOPPLER: Transaortic velocity was increased due to valvular stenosis  There was  moderate stenosis  ROCÍO 1 1 cm2, mean gradient 20 mm of hg and peak around 40 mm of hg  There was mild to moderate regurgitation  TRICUSPID VALVE: DOPPLER: There was trace regurgitation  The tricuspid jet envelope definition was inadequate for estimation of RV systolic pressure  PULMONIC VALVE: DOPPLER: There was no significant regurgitation  PERICARDIUM: There was no thickening or calcification  There was no pericardial effusion  AORTA: The root exhibited upper normal normal size  Size around 4 cm    SYSTEMIC VEINS: IVC: The inferior vena cava was not well visualized  SYSTEM MEASUREMENT TABLES    2D  EF (Teich): 54 08 %  %FS: 27 98 %  Ao Diam: 3 84 cm  EDV(Teich): 106 84 ml  ESV(Teich): 49 06 ml  IVSd: 1 36 cm  LA Area: 21 24 cm2  LA Diam: 4 39 cm  LVIDd: 4 79 cm  LVIDs: 3 45 cm  LVOT Diam: 2 18 cm  LVPWd: 1 32 cm  RA Area: 27 21 cm2  RVIDd: 4 12 cm  SV (Teich): 57 78 ml    PW  E' Lat: 0 08 m/s  E' Sept: 0 13 m/s  LVOT Env  Ti: 308 28 ms  LVOT VTI: 17 64 cm  LVOT Vmax: 0 89 m/s  LVOT Vmean: 0 57 m/s  LVOT maxPG: 3 19 mmHg  LVOT meanP 51 mmHg    [employer] Accredited [facility]ocardi[facility]    Prepared and electronically signed by    Tina Acuna MD  Signed 2021 14:54:03           HAILEE Haas        "This note has been constructed using a voice recognition system  Therefore there may be syntax, spelling, and/or grammatical errors   Please call if you have any questions  "

## 2021-01-07 NOTE — CASE MANAGEMENT
Patient in need of Eliquis prior to discharge  Script sent to pharmacy  SW checked pricing- $25 00  SW notified patient and provided Eliqius coupon  No other needs anticipated  Patient discharging today to home with family

## 2021-01-07 NOTE — PLAN OF CARE
Problem: PAIN - ADULT  Goal: Verbalizes/displays adequate comfort level or baseline comfort level  Description: Interventions:  - Encourage patient to monitor pain and request assistance  - Assess pain using appropriate pain scale  - Administer analgesics based on type and severity of pain and evaluate response  - Implement non-pharmacological measures as appropriate and evaluate response  - Consider cultural and social influences on pain and pain management  - Notify physician/advanced practitioner if interventions unsuccessful or patient reports new pain  1/7/2021 1533 by Javier Shin RN  Outcome: Completed  1/7/2021 1455 by Javier Shin RN  Outcome: Progressing     Problem: INFECTION - ADULT  Goal: Absence or prevention of progression during hospitalization  Description: INTERVENTIONS:  - Assess and monitor for signs and symptoms of infection  - Monitor lab/diagnostic results  - Monitor all insertion sites, i e  indwelling lines  - Administer medications as ordered  - Instruct and encourage patient and family to use good hand hygiene technique  1/7/2021 1533 by Javier Shin RN  Outcome: Completed  1/7/2021 1455 by Javier Shin RN  Outcome: Progressing     Problem: SAFETY ADULT  Goal: Patient will remain free of falls  Description: INTERVENTIONS:  - Assess patient frequently for physical needs  -  Identify cognitive and physical deficits and behaviors that affect risk of falls    -  [place] fall precautions as indicated by assessment   - Educate patient/family on patient safety including physical limitations  - Instruct patient to call for assistance with activity based on assessment  - Modify environment to reduce risk of injury  1/7/2021 1533 by Javier Shin RN  Outcome: Completed  1/7/2021 1455 by Javier Shin RN  Outcome: Progressing     Problem: DISCHARGE PLANNING  Goal: Discharge to home or other facility with appropriate resources  Description: INTERVENTIONS:  - Identify barriers to discharge w/patient   - Arrange for needed discharge resources and transportation as appropriate  - Identify discharge learning needs (meds  1/7/2021 1533 by Shahram Linton RN  Outcome: Completed  1/7/2021 1455 by Shahram Linton RN  Outcome: Progressing     Problem: Knowledge Deficit  Goal: Patient/family/caregiver demonstrates understanding of disease process, treatment plan, medications, and discharge instructions  Description: Complete learning assessment and assess knowledge base  Interventions:  - Provide teaching at level of understanding  - Provide teaching via preferred learning methods  1/7/2021 1533 by Shahram Linton RN  Outcome: Completed  1/7/2021 1455 by Shahram Linton RN  Outcome: Progressing     Problem: Neurological Deficit  Goal: Neurological status is stable or improving  Description: Interventions:  - Monitor and assess patient's level of consciousness, motor function, sensory function, and level of assistance needed for ADLs  - Monitor and report changes from baseline  Collaborate with interdisciplinary team to initiate plan and implement interventions as ordered  - Provide and maintain a safe environment  - Consider fall precautions  - Consider aspiration precautions  - Consider bleeding precautions  1/7/2021 1533 by Shahram Linton RN  Outcome: Completed  1/7/2021 1455 by Shahram Linton RN  Outcome: Progressing     Problem: Communication Impairment  Goal: Ability to express needs and understand communication  Description: Assess patient's communication skills and ability to understand information  - Collaborate with case management/ for discharge needs  - Include patient/family/caregiver in decisions related to communication    1/7/2021 1533 by Shahram Linton RN  Outcome: Completed  1/7/2021 1455 by Shahram Linton RN  Outcome: Progressing

## 2021-01-07 NOTE — ASSESSMENT & PLAN NOTE
Patient came home after work and had 2 beers  Wife reported left facial droop slurred speech  Patient's symptoms resolved in the ED  ·  CT of the head revealed a right distal M2/M3 branch occlusion  TPA was discussed but ultimately patient and wife declined  Admit to telemetry  · Patient currently on aspirin, Plavix and statin  · Lipid panel showed cholesterol 186,   · Hemoglobin A1c was 5 4  · MRI of the brain showed small acute cortical infarction in the right frontal operculum in the right MCA territory with associated trace amount internal petechial microhemorrhage  · 2D echo with bubble study showed EF of 60-65% with moderate concentric hypertrophy, moderate aortic stenosis  · Neurology consult appreciated  · Patient was initially on Lovenox and was transitioned to Eliquis 5 milligram p o  B i d   After discussion with Neurology  · Patient will also be discharged on baby aspirin along with high-dose statin due to M2/M3 division occlusion  · Patient is doing well with physical therapy

## 2021-01-07 NOTE — PROGRESS NOTES
Neurology Consult Follow Up      Harleen Wynn is a 60 y o  male  [address]-*    [id number]        Assessment/Recommendations:    1  Questionable CVA of M to territory-MRI remains pending  2  Atrial fibrillation-not being treated with anticoagulation  3  Hypertension  4  Hyperlipidemia-not on statin prior to arrival      -monitor on telemetry  -neuro assessments per stroke pathway  -full-dose aspirin given in the ER  -continue baby aspirin 81 mg daily-continue on DC  -Lipitor 80 mg daily  -DC plavix and Lovenox  -Start Eliquis 5 mg b i d  When cleared by Neurology  -blood pressure management per Cardiology  -MRI of the brain pending  -echocardiogram completed  -PT/OT  -speech therapy  -cardiology planning nuclear stress test in a m      The patient is is 80-year-old male with comorbid history consistent with atrial fibrillation, hypertension and now hyperlipidemia  Patient was on anticoagulation for some time up until several years ago at which point this was discontinued  Patient returns from work yesterday sat down to play video games and have a beverage when he started slurring his words and having facial drooping  Patient's wife took a look at him brought him into the ER via car  Stroke alert was called upon patient's arrival, continued to have facial asymmetry  Was taken to CT scan for further testing  Patient noted to have right distal M2/M3 occlusion which is consistent with patient's symptomatology of left facial droop and slurred speech  Neurology MD had extensive discussion with patient and his wife noted occlusion was identified on imaging and possible likelihood that deficits would return due to this occlusion  Risks of tPA including symptomatic hemorrhage had to be weighed against the potential facial weakness and dysarthria  Possibility that tPA would not be offered if symptoms return, unless stat MRI should be performed and show low risk of hemorrhage    Patient is wife for aware of a complex situation they opted not to take the risk of tPA at this time  Patient was then loaded on Plavix and aspirin noted for further evaluation and treatment under the stroke pathway  MRI completed with positive findings for Acute Rt Frontal MCA terrritory infarct with questionable trace internal petechial microhemmorhage about the recent infarct  Pt continues with Lovenox and DAPT today , will recommend DC of Lovenox, start on Eliquis 5mg bid and will DC Plavix therapy  Pt will need to maintain BASA for now due to the M2/3 division thrombus  At future visits, if not needed by Cardiology , can DC the aspirin  Bernardino Jakob will need follow up in 8-10 weeks with general attending or advance practitioner  He will not require outpatient neurological testing  Chief Complaint:    Subjective:   Pt reports that he feels pretty good today  He had his Stress test and seems to have tolerated it well  Pt denies headache, dizziness, vision changes or speech changes  Face appears to be more symmetrical today  Past Medical History:   Diagnosis Date    Atrial fibrillation (HCC)     Hypertension     Paroxysmal atrial fibrillation (HCC)     Valvular heart disease      Social History     Socioeconomic History    Marital status: /Civil Union     Spouse name: Not on file    Number of children: Not on file    Years of education: Not on file    Highest education level: Not on file   Occupational History    Not on file   Social Needs    Financial resource strain: Not on file    Food insecurity     Worry: Not on file     Inability: Not on file   [employer] needs     Medical: Not on file     Non-medical: Not on file   Tobacco Use    Smoking status: Never Smoker    Smokeless tobacco: Never Used   Substance and Sexual Activity    Alcohol use:  Yes     Alcohol/week: 6 0 standard drinks     Types: 6 Cans of beer per week     Frequency: 2-3 times a week     Drinks per session: 1 or 2     Binge frequency: Monthly    Drug use: No    Sexual activity: Not on file     Comment: not asked   Lifestyle    Physical activity     Days per week: Not on file     Minutes per session: Not on file    Stress: Not on file   Relationships    Social connections     Talks on phone: Not on file     Gets together: Not on file     Attends Yarsani service: Not on file     Active member of club or organization: Not on file     Attends meetings of clubs or organizations: Not on file     Relationship status: Not on file    Intimate partner violence     Fear of current or ex partner: Not on file     Emotionally abused: Not on file     Physically abused: Not on file     Forced sexual activity: Not on file   Other Topics Concern    Not on file   Social History Narrative    Not on file     Family History   Problem Relation Age of Onset    No Known Problems Mother     Stroke Father     Heart failure Brother         heart problems    Brain cancer Brother                 ROS:  Please see HPI for positive symptoms  No fever, no chills, no weight change  Ocular: No diplopia, no blurred vision, spot/zigzag lines   HEENT:  No sore throat, headache or congestion  No neck pain  COR:  No chest pain  No palpitations  Lungs:  no sob  GI:  no  nausea, no vomiting, no diarrhea, no constipation, no anorexia  :  No dysuria, frequency, or urgency  No hematuria  Musculoskeletal:  No joint pain or swelling   Skin:  No rash or itching  Psychiatric:  no anxiety, no depression  Endocrine:  No polyuria or polydipsia  Objective:  /95   Pulse 76   Temp 97 8 °F (36 6 °C) (Oral)   Resp 19   Ht 6' 1" (1 854 m)   Wt 130 kg (286 lb 9 6 oz)   SpO2 93%   BMI 37 81 kg/m²     General: alert   Mental status: oriented x3  Attention: normal  Knowledge: fair  Language and Speech: normal  Cranial nerves:   CN II: Visual fields are full to confrontation     Fundoscopic exam is normal with sharp discs and no vascular changes  Pupils are 3 mm and briskly reactive to light  CN III, IV, VI: At primary gaze, there is no eye deviation  CN V: Facial sensation is intact in all 3 divisions bilaterally  Corneal responses are intact  CN VII: Face is symmetrical, with normal eye closure and smile  CN VIII: Hearing is normal to rubbing fingers  CN IX, X: Palate elevates symmetrically  Phonation is normal   CN XI: Head turning and shoulder shrug are intact  CN XII: Tongue is midline with normal movements and no atrophy  Motor: There is no pronator drift of out-stretched arms  Muscle bulk and tone are normal       Muscle exam  Arm Right Left Leg Right Left   Deltoid 5/5 5/5 Iliopsoas 5/5 5/5   Biceps 5/5 5/5 Quads 5/5 5/5   Triceps 5/5 5/5 Hamstrings 5/5 5/5   Wrist Extension 5/5 5/5 Ankle Dorsi Flexion 5/5 5/5   Wrist Flexion 5/5 5/5 Ankle Plantar Flexion 5/5 5/5       Sensory: normal to light touch, temperature, vibration   Proprioception intact  Gait: steady  Coordination: finger to nose and heel to toe normal     Reflexes    RJ BJ TJ KJ AJ Plantars Carrasco's   Right 2+ 2+ 2+ 2+ 2+ Downgoing Not present   Left 2+ 2+ 2+ 2+ 2+ Downgoing Not present      Heart: Regular rate and rhythm  Lung: clear to auscultation   Abd: soft, non-tender, non-distended with positive bowel sounds in all quads  Skin: dry and intact    Labs:      Lab Results   Component Value Date    WBC 7 35 01/06/2021    HGB 16 4 01/06/2021    HCT 48 4 01/06/2021    MCV 90 01/06/2021     01/06/2021     Lab Results   Component Value Date    HGBA1C 5 4 01/06/2021     Lab Results   Component Value Date    ALT 69 01/06/2021    AST 35 01/06/2021    ALKPHOS 63 01/06/2021    BILITOT 0 6 12/26/2015     Lab Results   Component Value Date    GLUCOSE 104 12/26/2015    CALCIUM 8 8 01/06/2021     12/26/2015    K 3 8 01/06/2021    CO2 26 01/06/2021     01/06/2021    BUN 17 01/06/2021    CREATININE 0 91 01/06/2021     Chol 186      Review of reports and notes reveal:   Independent review of films/reports:  Cta Head And Neck With And Without Contrast  Result Date: 1/5/2021  Right distal M2/M3 branch occlusion  Cardiomegaly  I personally discussed this study with DR [facility] on 1/5/2021 at 6:11 PM  Workstation performed: [id number]     Mri Brain Wo Contrast  Result Date: 1/7/2021  1  Small acute cortical infarction in the right frontal operculum, in the right MCA territory  Questionable trace amount of associated internal petechial microhemorrhage in this region of recent/acute infarction  No significant mass effect  Workstation performed: [id number]     Ct Stroke Alert Brain  Result Date: 1/5/2021  No acute intracranial abnormality  Microangiopathic changes  Findings were directly discussed with Jovanny Rizzo on 1/5/2021 5:49 PM  Workstation performed: [id number]         Thank you for this consult      Total time of encounter:  30 min  More than 50% of the time was used in counseling and/or coordination of care  Extent of couseling and/or coordination of care

## 2021-01-07 NOTE — ASSESSMENT & PLAN NOTE
Patient rate controlled atrial fibrillation in the emergency department, unclear onset  · Patient has known history of atrial fibrillation from 2015  Patient was on Coumadin anticoagulation previously and was transitioned to aspirin  · Patient's chads vascular score is 2 with an adjusted stroke rate of 2 2% per year  · 2D echo showed EF of 60-60% with moderate aortic stenosis  · Patient started on metoprolol 25 milligram p o  B i d   Verapamil was discontinued  Metoprolol dose increased to 50 milligram p o  B i d   · Discussed the risks and benefits of novel anticoagulants versus Coumadin  Patient agreeable to take Eliquis    · Patient was on Lovenox treatment dose during hospitalization lb transitioned to Eliquis 5 milligram p o  B i d   · Nuclear stress test showed no evidence of ischemia

## 2021-01-08 LAB
ATRIAL RATE: 84 BPM
CHEST PAIN STATEMENT: NORMAL
MAX DIASTOLIC BP: 65 MMHG
MAX HEART RATE: 131 BPM
MAX PREDICTED HEART RATE: 156 BPM
MAX. SYSTOLIC BP: 148 MMHG
PROTOCOL NAME: NORMAL
QRS AXIS: -33 DEGREES
QRSD INTERVAL: 98 MS
QT INTERVAL: 388 MS
QTC INTERVAL: 495 MS
REASON FOR TERMINATION: NORMAL
T WAVE AXIS: 20 DEGREES
TARGET HR FORMULA: NORMAL
TEST INDICATION: NORMAL
TIME IN EXERCISE PHASE: NORMAL
VENTRICULAR RATE: 98 BPM

## 2021-01-08 PROCEDURE — 93010 ELECTROCARDIOGRAM REPORT: CPT | Performed by: INTERNAL MEDICINE

## 2021-01-11 ENCOUNTER — TELEPHONE (OUTPATIENT)
Dept: NEUROLOGY | Facility: CLINIC | Age: 66
End: 2021-01-11

## 2021-01-12 ENCOUNTER — TELEPHONE (OUTPATIENT)
Dept: CARDIOLOGY CLINIC | Facility: CLINIC | Age: 66
End: 2021-01-12

## 2021-01-12 NOTE — LETTER
January 12, 2021     Patient: Monica Espino   YOB: 1955   Date of Visit: 1/12/2021       To Whom it May Concern:    Leland Shah is under my professional care  He was seen ***    He may return to work on January 19, 2021    If you have any questions or concerns, please don't hesitate to call           Sincerely,          Onel Bonilla MA        CC: No Recipients

## 2021-01-12 NOTE — TELEPHONE ENCOUNTER
Left a voicemail for the patient to call back to schedule a follow up discharge from the hospital  Pt requested a return to work note for 1/19/2021, however pt needs to be seen

## 2021-01-14 ENCOUNTER — TELEPHONE (OUTPATIENT)
Dept: NEUROLOGY | Facility: CLINIC | Age: 66
End: 2021-01-14

## 2021-01-14 NOTE — TELEPHONE ENCOUNTER
Patient called requesting some information  He stated that he has a note that keeps him out of work until Monday, 1/18, but that the [facility] would not allow him to drive for 1 year post-stroke  He's unsure of any restrictions that he has (if any) and if he is allowed drive at all  He is concerned with waiting until February for his HFU appt due to lack of income from not applying for disability, etc  until the appt  Wade Santacruz has an opening that I put on hold for tomorrow for this patient in case we want to bring him in to discuss further or if he needs to be evaluated

## 2021-01-14 NOTE — TELEPHONE ENCOUNTER
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

## 2021-01-19 ENCOUNTER — CONSULT (OUTPATIENT)
Dept: CARDIOLOGY CLINIC | Facility: CLINIC | Age: 66
End: 2021-01-19
Payer: COMMERCIAL

## 2021-01-19 VITALS
WEIGHT: 274 LBS | HEIGHT: 73 IN | TEMPERATURE: 98.3 F | DIASTOLIC BLOOD PRESSURE: 76 MMHG | RESPIRATION RATE: 18 BRPM | BODY MASS INDEX: 36.31 KG/M2 | HEART RATE: 65 BPM | OXYGEN SATURATION: 98 % | SYSTOLIC BLOOD PRESSURE: 112 MMHG

## 2021-01-19 DIAGNOSIS — I48.91 ATRIAL FIBRILLATION, UNSPECIFIED TYPE (HCC): Primary | ICD-10-CM

## 2021-01-19 DIAGNOSIS — I10 ESSENTIAL HYPERTENSION: ICD-10-CM

## 2021-01-19 DIAGNOSIS — E78.49 HYPERLIPIDEMIA, FAMILIAL, HIGH LDL: ICD-10-CM

## 2021-01-19 DIAGNOSIS — I63.9 ACUTE ISCHEMIC STROKE (HCC): ICD-10-CM

## 2021-01-19 PROCEDURE — 93000 ELECTROCARDIOGRAM COMPLETE: CPT | Performed by: INTERNAL MEDICINE

## 2021-01-19 PROCEDURE — 99215 OFFICE O/P EST HI 40 MIN: CPT | Performed by: INTERNAL MEDICINE

## 2021-01-19 RX ORDER — METOPROLOL TARTRATE 50 MG/1
50 TABLET, FILM COATED ORAL
Qty: 90 TABLET | Refills: 2 | Status: SHIPPED | OUTPATIENT
Start: 2021-01-19 | End: 2021-10-26

## 2021-01-19 NOTE — PROGRESS NOTES
Progress Note - Cardiology Office  [facility]    Junior Robert 60 y o  male MRN: [id number]  : 1955  Encounter: [id number]      ASSESSMENT:   Chronic atrial fibrillation:  Asymptomatic  Normal LV function by recent echo  Rate controlled on Eliquis 5 mg b i d  and metoprolol tartrate 50 mg a m  and 25 mg p m  Moderate aortic stenosis: ROCÍO 1 1 cm2, mean gradient 20 mm of hg and peak 40 mm of hg  There was mild to moderate regurgitation  Asymptomatic  Small ascending aortic aneurysm of 4 1 centimetre is    Hypertension with moderate left Ventricular hypertrophy:  BP today is 112/76 with heart rate of 65    History of CVA/TIA:  Recently was in the hospital with facial droop and speech difficulty and was found to have M2 mM3 branch occlusion  Signs and symptoms have subsequently resolved  Refused tPA  On Eliquis and aspirin    Dyslipidemia:  On high intensity statin  2021:  , HDL 38    Obesity  BMI 37 81    RECOMMENDATIONS:  Continue Eliquis, aspirin, metoprolol and high-intensity statin    Periodically surveillance study for moderate AS and ascending AA    Follow-up lipid profile and LFTs    Diet, exercise and weight loss    Follow-up with Neurology also    Please call [phone] if any questions  HPI :     Junior Robert is a 60y o  year old male who came for follow up  Ezequiel Castaneda He was recently in the hospital with TIA/CVA  He had presented with facial droop and speech difficulty  Refused tPA  Was managed medically  Symptoms subsequently resolved  Denies any new or acute cardiac symptoms today      Review of Systems   All other systems reviewed and are negative  Complains of some pressure behind the right eye  According to the patient all other symptoms have resolved      Historical Information   Past Medical History:   Diagnosis Date    Atrial fibrillation ([address] )     Hypertension     Paroxysmal atrial fibrillation (HCC)     Valvular heart disease Past Surgical History:   Procedure Laterality Date    HERNIA REPAIR      KNEE SURGERY       Social History     Substance and Sexual Activity   Alcohol Use Yes    Alcohol/week: 6 0 standard drinks    Types: 6 Cans of beer per week    Frequency: 2-3 times a week    Drinks per session: 1 or 2    Binge frequency: Monthly     Social History     Substance and Sexual Activity   Drug Use No     Social History     Tobacco Use   Smoking Status Never Smoker   Smokeless Tobacco Never Used     Family History:   Family History   Problem Relation Age of Onset    No Known Problems Mother     Stroke Father     Heart failure Brother         heart problems    Brain cancer Brother          0       Meds/Allergies     No Known Allergies    Current Outpatient Medications:     apixaban (ELIQUIS) 5 mg, Take 1 tablet (5 mg total) by mouth 2 (two) times a day, Disp: 60 tablet, Rfl: 0    aspirin 81 mg chewable tablet, Chew 1 tablet (81 mg total) daily, Disp: 30 tablet, Rfl: 0    atorvastatin (LIPITOR) 80 mg tablet, Take 1 tablet (80 mg total) by mouth daily with dinner, Disp: 30 tablet, Rfl: 0    metoprolol tartrate (LOPRESSOR) 50 mg tablet, Take 1 tablet (50 mg total) by mouth every 12 (twelve) hours, Disp: 60 tablet, Rfl: 0    Vitals:   /76 mm Hg  HR 65/Min  BP Readings from Last 3 Encounters:   21 143/84   20 120/70   18 120/80         Physical Exam  Neurologic:  Alert & oriented x 3, no new focal deficits, Not in any acute distress,  Constitutional:  Well developed, well nourished, non-toxic appearance   Eyes:  Pupil equal and reacting to light, conjunctiva normal, No JVP, No LNP   HENT:  Atraumatic, oropharynx moist, Neck- normal range of motion, no tenderness,  Neck supple   Respiratory:  Bilateral air entry, mostly clear to auscultation  Cardiovascular: S1-S2 irregularly irregular with a I/VI ejection systolic murmur  GI:  Soft, nondistended, normal bowel sounds, nontender, no hepatosplenomegaly appreciated  Musculoskeletal:  No edema, no tenderness, no deformities  Skin:  Well hydrated, no rash   Lymphatic:  No lymphadenopathy noted   Extremities:  No edema and distal pulses are present        Diagnostic Studies Review Cardio:      EKG:  Atrial fibrillation with controlled ventricular response  Heart rate 65 per minute    Cardiac testing:   Results for orders placed during the hospital encounter of 21   Echo complete with contrast if indicated    Narrative [address]  [address] [facility]  [place] [address]  [phone]    Transthoracic Echocardiogram  2D, M-mode, Doppler, and Color Doppler    Study date:  2021    Patient: Edie Garland  MR number: [id number]  Account number: [de-identified]  : 1955  Age: 60 years  Gender: Male  Status: Outpatient  Location: Bedside  Height: 71 in  Weight: 285 3 lb  BP: 139/ 93 mmHg    Indications: TIA    Diagnoses: G45 9 - Transient cerebral ischemic attack, unspecified    Sonographer:  KELVIN Isaacs  Primary Physician:  Carol Pearson MD  Referring Physician:  HAILEE Arroyo  Group:  [facility]  Interpreting Physician:  Wilmer Olivier MD    SUMMARY    PROCEDURE INFORMATION:  This was a technically difficult study  LEFT VENTRICLE:  Systolic function was normal  Ejection fraction was estimated in the range of 60 % to 65 % to be 65 %  Although no diagnostic regional wall motion abnormality was identified, this possibility cannot be completely excluded on the basis of this study  Wall thickness was moderately increased  There was moderate concentric hypertrophy  LEFT ATRIUM:  The atrium was mildly to moderately dilated  ATRIAL SEPTUM:  No good quality bubble study was recorded    RIGHT ATRIUM:  The atrium was mildly to moderately dilated  MITRAL VALVE:  There was mild to moderate annular calcification  There was mild regurgitation      AORTIC VALVE:  The valve was trileaflet  Leaflets exhibited moderately increased thickness, moderate calcification, and moderately reduced cuspal separation  Transaortic velocity was increased due to valvular stenosis  There was moderate stenosis  ROCÍO 1 1 cm2, mean gradient 20 mm of hg and peak around 40 mm of hg  There was mild to moderate regurgitation  TRICUSPID VALVE:  There was trace regurgitation  The tricuspid jet envelope definition was inadequate for estimation of RV systolic pressure  AORTA:  The root exhibited upper normal normal size  Size around 4 cm    HISTORY: PRIOR HISTORY: TIA,A Fib ,HTN,Valvular heart disease  PROCEDURE: The procedure was performed at the bedside  This was a routine study  The transthoracic approach was used  The study included complete 2D imaging, M-mode, complete spectral Doppler, and color Doppler  The heart rate was 107 bpm,  at the start of the study  Images were obtained from the parasternal, apical, subcostal, and suprasternal notch acoustic windows  Intravenous contrast (agitated saline) was administered to evaluate possible R-L intracardiac shunting  This  was a technically difficult study  LEFT VENTRICLE: Size was normal  Systolic function was normal  Ejection fraction was estimated in the range of 60 % to 65 % to be 65 %  Although no diagnostic regional wall motion abnormality was identified, this possibility cannot be  completely excluded on the basis of this study  Wall thickness was moderately increased  There was moderate concentric hypertrophy  DOPPLER: The study was not technically sufficient to allow evaluation of LV diastolic function, due to  atrial fibrillation  RIGHT VENTRICLE: The size was normal  Systolic function was normal     LEFT ATRIUM: The atrium was mildly to moderately dilated  ATRIAL SEPTUM: No good quality bubble study was recorded    RIGHT ATRIUM: The atrium was mildly to moderately dilated      MITRAL VALVE: There was mild to moderate annular calcification  There was mild calcification  There was normal leaflet separation  DOPPLER: The transmitral velocity was within the normal range  There was no evidence for stenosis  There was  mild regurgitation  AORTIC VALVE: The valve was trileaflet  Leaflets exhibited moderately increased thickness, moderate calcification, and moderately reduced cuspal separation  DOPPLER: Transaortic velocity was increased due to valvular stenosis  There was  moderate stenosis  ROCÍO 1 1 cm2, mean gradient 20 mm of hg and peak around 40 mm of hg  There was mild to moderate regurgitation  TRICUSPID VALVE: DOPPLER: There was trace regurgitation  The tricuspid jet envelope definition was inadequate for estimation of RV systolic pressure  PULMONIC VALVE: DOPPLER: There was no significant regurgitation  PERICARDIUM: There was no thickening or calcification  There was no pericardial effusion  AORTA: The root exhibited upper normal normal size  Size around 4 cm    SYSTEMIC VEINS: IVC: The inferior vena cava was not well visualized  SYSTEM MEASUREMENT TABLES    2D  EF (Teich): 54 08 %  %FS: 27 98 %  Ao Diam: 3 84 cm  EDV(Teich): 106 84 ml  ESV(Teich): 49 06 ml  IVSd: 1 36 cm  LA Area: 21 24 cm2  LA Diam: 4 39 cm  LVIDd: 4 79 cm  LVIDs: 3 45 cm  LVOT Diam: 2 18 cm  LVPWd: 1 32 cm  RA Area: 27 21 cm2  RVIDd: 4 12 cm  SV (Teich): 57 78 ml    PW  E' Lat: 0 08 m/s  E' Sept: 0 13 m/s  LVOT Env  Ti: 308 28 ms  LVOT VTI: 17 64 cm  LVOT Vmax: 0 89 m/s  LVOT Vmean: 0 57 m/s  LVOT maxPG: 3 19 mmHg  LVOT meanP 51 mmHg    [employer] Accredited Echocardiography Laboratory    Prepared and electronically signed by    Kimberlee Duenas MD  Signed 2021 14:54:03       No results found for this or any previous visit  No results found for this or any previous visit  No results found for this or any previous visit    Results for orders placed during the hospital encounter of 21   NM Myocardial Perfusion Spect (Exercise Induced Stress and/or Rest)    Narrative [address]  [address] [facility]  [phone]    Regadenoson    Patient: Antonio Samuel  MR number: [id number]  Account number: [de-identified]  : 1955  Age: 60 years  Gender: Male  Status: Outpatient  Location: Stress lab  Height: 73 in  Weight: 286 lb  BP: 131/ 74 mmHg    Allergies: NO KNOWN ALLERGIES    Primary Physician:  Yazmin Werner MD  RN:  Stacey Hopkins RN  Interpreting Physician:  Marciano Quiñones MD    INDICATIONS: Coronary artery disease  HISTORY: The patient is a 60year old  male  Chest pain status: no chest pain  Coronary artery disease risk factors: dyslipidemia and family history of premature coronary artery disease  Small, acute cortical infarct in right MCA  territory, chronic Afib Medications: a beta blocker, clopidogrel, and a lipid lowering agent  REST ECG: Atrial fibrillation  PROCEDURE: The study was performed in the the Stress lab  A regadenoson infusion pharmacologic stress test was performed  Systolic blood pressure was 131 mmHg, at the start of the study  Diastolic blood pressure was 74 mmHg, at the start  of the study  The heart rate was 75 bpm, at the start of the study  IV double checked  Regadenoson protocol:  Time HR bpm SBP mmHg DBP mmHg Symptoms Rhythm/conduct  Baseline 10:18 75 131 74 none A-fib  Immediate 13:20 -- -- -- -- --  1 min 10:19 101 128 70 mild dyspnea, flushing same as above, occasional PVC's  2 min 10:20 130 135 65 subsiding A-fib, occasional PVC's  3 min 10:21 109 121 60 subsiding A-fib, occasional PVC's  4 min 10:22 103 121 70 subsiding A-fib, occasional PVC's  5 min 10:23 101 119 66 subsiding same as above    STRESS SUMMARY: Duration of pharmacologic stress was 3 min and 0 sec  Maximal heart rate during stress was 131 bpm ( 83 % of maximal predicted heart rate)   The rate-pressure product for the peak heart rate and blood pressure was 71216   There was no chest pain during stress  The stress test was terminated due to protocol completion  The stress ECG was equivocal for ischemia  There were no stress arrhythmias or conduction abnormalities  Patient has chronic atrial  fibrillation  ISOTOPE ADMINISTRATION:  Resting isotope administration Stress isotope administration  Agent Tetrofosmin Tetrofosmin  Dose 11 mCi 32 8 mCi  Injection time 08:25 10:19    The radiopharmaceutical was injected at the peak effect of pharmacologic stress  MYOCARDIAL PERFUSION IMAGING:  The image quality was fair  Rotating projection images reveal mild diaphragmatic attenuation  Left ventricular size was normal  The TID ratio was 1  18  The right ventricle was dilated  PERFUSION DEFECTS:  -  There was a small, mildly severe, fixed myocardial perfusion defect of the apical wall likely  As there appeared normalized on prone images    GATED SPECT:  The calculated left ventricular ejection fraction was 52 %  Left ventricular ejection fraction was within normal limits by visual estimate  There was no left ventricular regional abnormality  SUMMARY:  -  Stress results: Target heart rate was not achieved  There was no chest pain during stress  -  ECG conclusions: The stress ECG was equivocal for ischemia  -  Perfusion imaging: The right ventricle was dilated  There was a small, mildly severe, fixed myocardial perfusion defect of the apical wall likely  As there appeared normalized on prone images  -  Gated SPECT: The calculated left ventricular ejection fraction was 52 %  Left ventricular ejection fraction was within normal limits by visual estimate  There was no left ventricular regional abnormality  IMPRESSIONS: Probably normal study after pharmacologic vasodilation without reproduction of symptoms  EF is 50-55% Myocardial perfusion imaging was normal at rest and with stress   Left ventricular systolic function was normal  Diagnostic  sensitivity was limited by submaximal stress  Prepared and signed by    Layne Hashimoto, MD  Signed 01/07/2021 16:03:47       No results found for this or any previous visit  Imaging:  Chest X-Ray:   No Chest XR results available for this patient  CT-scan of the chest:     Cta Head And Neck With And Without Contrast    Result Date: 1/5/2021  Impression Right distal M2/M3 branch occlusion  Cardiomegaly    I personally discussed this study with DR [facility]UR[facility] on 1/5/2021 at 6:11 PM  Workstation performed: [id number]     Lab Review   Lab Results   Component Value Date    WBC 7 35 01/06/2021    HGB 16 4 01/06/2021    HCT 48 4 01/06/2021    MCV 90 01/06/2021    RDW 13 3 01/06/2021     01/06/2021     BMP:  Lab Results   Component Value Date    SODIUM 137 01/06/2021    K 3 8 01/06/2021     01/06/2021    CO2 26 01/06/2021    ANIONGAP 13 0 12/26/2015    BUN 17 01/06/2021    CREATININE 0 91 01/06/2021    GLUC 102 01/06/2021    CALCIUM 8 8 01/06/2021    CORRECTEDCA 8 9 01/05/2021    EGFR 89 01/06/2021    MG 2 1 01/06/2021     LFT:  Lab Results   Component Value Date    AST 35 01/06/2021    ALT 69 01/06/2021    ALKPHOS 63 01/06/2021    TP 8 1 01/06/2021    ALB 3 7 01/06/2021      No components found for: TSH3  No results found for: [facility]  Lab Results   Component Value Date    HGBA1C 5 4 01/06/2021     Lipid Profile:   Lab Results   Component Value Date    CHOLESTEROL 186 01/06/2021    HDL 38 (L) 01/06/2021    LDLCALC 129 (H) 01/06/2021    TRIG 94 01/06/2021     Lab Results   Component Value Date    CHOLESTEROL 186 01/06/2021     Lab Results   Component Value Date    TROPONINI <0 02 01/05/2021     No results found for: NTBNP   Recent Results (from the past 672 hour(s))   Fingerstick Glucose (POCT)    Collection Time: 01/05/21  5:34 PM   Result Value Ref Range    POC Glucose 113 65 - 140 mg/dl   CBC and differential    Collection Time: 01/05/21  5:58 PM   Result Value Ref Range    WBC 7 89 4 31 - 10 16 Thousand/uL    RBC 5  05 3 88 - 5 62 Million/uL    Hemoglobin 15 5 12 0 - 17 0 g/dL    Hematocrit 45 2 36 5 - 49 3 %    MCV 90 82 - 98 fL    MCH 30 7 26 8 - 34 3 pg    MCHC 34 3 31 4 - 37 4 g/dL    RDW 13 2 11 6 - 15 1 %    MPV 10 6 8 9 - 12 7 fL    Platelets 518 987 - 297 Thousands/uL    Neutrophils Relative 56 43 - 75 %    Lymphocytes Relative 31 14 - 44 %    Monocytes Relative 9 4 - 12 %    Eosinophils Relative 3 0 - 6 %    Basophils Relative 1 0 - 1 %    Neutrophils Absolute 4 42 1 85 - 7 62 Thousands/µL    Lymphocytes Absolute 2 46 0 60 - 4 47 Thousands/µL    Monocytes Absolute 0 71 0 17 - 1 22 Thousand/µL    Eosinophils Absolute 0 26 0 00 - 0 61 Thousand/µL    Basophils Absolute 0 04 0 00 - 0 10 Thousands/µL   Protime-INR    Collection Time: 01/05/21  5:58 PM   Result Value Ref Range    Protime 13 8 11 6 - 14 5 seconds    INR 1 06 0 84 - 1 19   APTT    Collection Time: 01/05/21  5:58 PM   Result Value Ref Range    PTT 28 23 - 37 seconds   Comprehensive metabolic panel    Collection Time: 01/05/21  5:58 PM   Result Value Ref Range    Sodium 135 (L) 136 - 145 mmol/L    Potassium 4 1 3 5 - 5 3 mmol/L    Chloride 100 100 - 108 mmol/L    CO2 28 21 - 32 mmol/L    ANION GAP 7 4 - 13 mmol/L    BUN 21 5 - 25 mg/dL    Creatinine 0 81 0 60 - 1 30 mg/dL    Glucose 96 65 - 140 mg/dL    Calcium 8 4 8 3 - 10 1 mg/dL    Corrected Calcium 8 9 8 3 - 10 1 mg/dL    AST 33 5 - 45 U/L    ALT 58 12 - 78 U/L    Alkaline Phosphatase 54 46 - 116 U/L    Total Protein 7 3 6 4 - 8 2 g/dL    Albumin 3 4 (L) 3 5 - 5 0 g/dL    Total Bilirubin 0 20 0 20 - 1 00 mg/dL    eGFR 94 ml/min/1 73sq m   Troponin I    Collection Time: 01/05/21  5:58 PM   Result Value Ref Range    Troponin I <0 02 <=0 04 ng/mL   ECG 12 lead    Collection Time: 01/05/21  6:06 PM   Result Value Ref Range    Ventricular Rate 98 BPM    Atrial Rate 84 BPM    OK Interval  ms    QRSD Interval 98 ms    QT Interval 388 ms    QTC Interval 495 ms    P Axis  degrees    QRS Axis -33 degrees    T Wave Axis 20 degrees   UA (URINE) with reflex to Scope    Collection Time: 01/05/21  6:17 PM   Result Value Ref Range    Color, UA Colorless     Clarity, UA Clear     Specific Gravity, UA <=1 005 1 000 - 1 030    pH, UA 6 0 5 0, 5 5, 6 0, 6 5, 7 0, 7 5, 8 0, 8 5, 9 0    Leukocytes, UA Negative Negative    Nitrite, UA Negative Negative    Protein, UA Negative Negative mg/dl    Glucose, UA Negative Negative mg/dl    Ketones, UA Negative Negative mg/dl    Urobilinogen, UA 0 2 0 2, 1 0 E U /dl E U /dl    Bilirubin, UA Negative Negative    Blood, UA Trace-Intact (A) Negative   Urine Microscopic    Collection Time: 01/05/21  6:17 PM   Result Value Ref Range    RBC, UA 0-1 None Seen, 0-1, 1-2, 2-4, 0-5 /hpf    WBC, UA None Seen None Seen, 0-1, 1-2, 0-5, 2-4 /hpf    Epithelial Cells Occasional None Seen, Occasional /hpf    Bacteria, UA Occasional None Seen, Occasional /hpf   Lipid Panel with Direct LDL reflex    Collection Time: 01/06/21  8:31 AM   Result Value Ref Range    Cholesterol 186 50 - 200 mg/dL    Triglycerides 94 <=150 mg/dL    HDL, Direct 38 (L) >=40 mg/dL    LDL Calculated 129 (H) 0 - 100 mg/dL   Hemoglobin A1c w/EAG Estimation    Collection Time: 01/06/21  8:31 AM   Result Value Ref Range    Hemoglobin A1C 5 4 Normal 3 8-5 6%; PreDiabetic 5 7-6 4%;  Diabetic >=6 5%; Glycemic control for adults with diabetes <7 0% %     mg/dl   Comprehensive metabolic panel    Collection Time: 01/06/21  8:31 AM   Result Value Ref Range    Sodium 137 136 - 145 mmol/L    Potassium 3 8 3 5 - 5 3 mmol/L    Chloride 103 100 - 108 mmol/L    CO2 26 21 - 32 mmol/L    ANION GAP 8 4 - 13 mmol/L    BUN 17 5 - 25 mg/dL    Creatinine 0 91 0 60 - 1 30 mg/dL    Glucose 102 65 - 140 mg/dL    Calcium 8 8 8 3 - 10 1 mg/dL    AST 35 5 - 45 U/L    ALT 69 12 - 78 U/L    Alkaline Phosphatase 63 46 - 116 U/L    Total Protein 8 1 6 4 - 8 2 g/dL    Albumin 3 7 3 5 - 5 0 g/dL    Total Bilirubin 0 70 0 20 - 1 00 mg/dL    eGFR 89 ml/min/1 73sq m Magnesium    Collection Time: 01/06/21  8:31 AM   Result Value Ref Range    Magnesium 2 1 1 6 - 2 6 mg/dL   CBC (With Platelets)    Collection Time: 01/06/21  8:31 AM   Result Value Ref Range    WBC 7 35 4 31 - 10 16 Thousand/uL    RBC 5 41 3 88 - 5 62 Million/uL    Hemoglobin 16 4 12 0 - 17 0 g/dL    Hematocrit 48 4 36 5 - 49 3 %    MCV 90 82 - 98 fL    MCH 30 3 26 8 - 34 3 pg    MCHC 33 9 31 4 - 37 4 g/dL    RDW 13 3 11 6 - 15 1 %    Platelets 640 578 - 197 Thousands/uL    MPV 10 6 8 9 - 12 7 fL   Stress strip    Collection Time: 01/07/21 10:16 AM   Result Value Ref Range    Protocol Name [address] [facility]     Time In Exercise Phase 00:01:00     MAX  SYSTOLIC  mmHg    Max Diastolic Bp 65 mmHg    Max Heart Rate 131 BPM    Max Predicted Heart Rate 156 BPM    Reason for Termination Protocol Complete     Test Indication atrial fib     Target Hr Formular (220 - Age)*100%     Arrhy During Ex      ECG Interp Before Ex      ECG Interp during Ex      Ex Summary Comment      Chest Pain Statement none     Overall Hr Response To Exercise      Overall BP Response To Exercise               Dr Joan Sorensen MD, [facility]      "This note has been constructed using a voice recognition system  Therefore there may be syntax, spelling, and/or grammatical errors   Please call if you have any questions  "

## 2021-01-26 NOTE — TELEPHONE ENCOUNTER
I called and spoke with the patient in regards to his disability forms  He is having his cardiologist and PCP complete them as they are aware of the [place] [employer] laws regarding stroke  He does not need anything further from us at this time

## 2021-02-23 ENCOUNTER — OFFICE VISIT (OUTPATIENT)
Dept: NEUROLOGY | Facility: CLINIC | Age: 66
End: 2021-02-23
Payer: COMMERCIAL

## 2021-02-23 DIAGNOSIS — R68.89 FLU-LIKE SYMPTOMS: ICD-10-CM

## 2021-02-23 DIAGNOSIS — R05.9 COUGH IN ADULT: ICD-10-CM

## 2021-02-23 DIAGNOSIS — I48.91 ATRIAL FIBRILLATION, UNSPECIFIED TYPE (HCC): ICD-10-CM

## 2021-02-23 DIAGNOSIS — E78.2 MIXED HYPERLIPIDEMIA: ICD-10-CM

## 2021-02-23 DIAGNOSIS — I69.30 CHRONIC ISCHEMIC RIGHT MCA STROKE: Primary | ICD-10-CM

## 2021-02-23 PROCEDURE — 99214 OFFICE O/P EST MOD 30 MIN: CPT | Performed by: PSYCHIATRY & NEUROLOGY

## 2021-02-23 PROCEDURE — 0241U HB NFCT DS VIR RESP RNA 4 TRGT: CPT | Performed by: PSYCHIATRY & NEUROLOGY

## 2021-02-24 LAB
FLUAV RNA RESP QL NAA+PROBE: NEGATIVE
FLUBV RNA RESP QL NAA+PROBE: NEGATIVE
RSV RNA RESP QL NAA+PROBE: NEGATIVE
SARS-COV-2 RNA RESP QL NAA+PROBE: NEGATIVE

## 2021-04-15 ENCOUNTER — TELEPHONE (OUTPATIENT)
Dept: NEUROLOGY | Facility: CLINIC | Age: 66
End: 2021-04-15

## 2021-04-15 DIAGNOSIS — I63.9 ACUTE ISCHEMIC STROKE (HCC): ICD-10-CM

## 2021-04-15 DIAGNOSIS — I48.91 ATRIAL FIBRILLATION, UNSPECIFIED TYPE (HCC): ICD-10-CM

## 2021-04-15 RX ORDER — ATORVASTATIN CALCIUM 80 MG/1
80 TABLET, FILM COATED ORAL
Qty: 90 TABLET | Refills: 3 | Status: SHIPPED | OUTPATIENT
Start: 2021-04-15 | End: 2021-07-21 | Stop reason: SDUPTHER

## 2021-04-15 NOTE — TELEPHONE ENCOUNTER
Patient requested eliquis and lipitor  Informed patient that neither of these meds were prescribed by Dr Migdalia Phoenix and advised he reach out to cardiology since they had prescribed his eliquis for him  He stated he will call them and if they're not agreeable, he'll call us back

## 2021-05-21 DIAGNOSIS — I10 ESSENTIAL HYPERTENSION: ICD-10-CM

## 2021-05-21 DIAGNOSIS — I48.91 ATRIAL FIBRILLATION, UNSPECIFIED TYPE (HCC): ICD-10-CM

## 2021-06-09 ENCOUNTER — HOSPITAL ENCOUNTER (EMERGENCY)
Facility: HOSPITAL | Age: 66
Discharge: HOME | End: 2021-06-09
Attending: EMERGENCY MEDICINE | Admitting: EMERGENCY MEDICINE
Payer: COMMERCIAL

## 2021-06-09 ENCOUNTER — APPOINTMENT (EMERGENCY)
Dept: RADIOLOGY | Facility: HOSPITAL | Age: 66
End: 2021-06-09
Payer: COMMERCIAL

## 2021-06-09 VITALS
WEIGHT: 262 LBS | OXYGEN SATURATION: 98 % | BODY MASS INDEX: 34.57 KG/M2 | DIASTOLIC BLOOD PRESSURE: 72 MMHG | HEART RATE: 53 BPM | SYSTOLIC BLOOD PRESSURE: 147 MMHG | TEMPERATURE: 97.8 F | RESPIRATION RATE: 18 BRPM

## 2021-06-09 DIAGNOSIS — R51.9 HEADACHE: Primary | ICD-10-CM

## 2021-06-09 LAB
ANION GAP SERPL CALCULATED.3IONS-SCNC: 10 MMOL/L (ref 4–13)
APTT PPP: 34 SECONDS (ref 23–37)
BASOPHILS # BLD AUTO: 0.04 THOUSANDS/ΜL (ref 0–0.1)
BASOPHILS NFR BLD AUTO: 1 % (ref 0–1)
BUN SERPL-MCNC: 15 MG/DL (ref 5–25)
CALCIUM SERPL-MCNC: 9 MG/DL (ref 8.3–10.1)
CHLORIDE SERPL-SCNC: 105 MMOL/L (ref 100–108)
CO2 SERPL-SCNC: 27 MMOL/L (ref 21–32)
CREAT SERPL-MCNC: 0.74 MG/DL (ref 0.6–1.3)
EOSINOPHIL # BLD AUTO: 0.12 THOUSAND/ΜL (ref 0–0.61)
EOSINOPHIL NFR BLD AUTO: 2 % (ref 0–6)
ERYTHROCYTE [DISTWIDTH] IN BLOOD BY AUTOMATED COUNT: 12.8 % (ref 11.6–15.1)
GFR SERPL CREATININE-BSD FRML MDRD: 97 ML/MIN/1.73SQ M
GLUCOSE SERPL-MCNC: 113 MG/DL (ref 65–140)
HCT VFR BLD AUTO: 46.5 % (ref 36.5–49.3)
HGB BLD-MCNC: 15.5 G/DL (ref 12–17)
IMM GRANULOCYTES # BLD AUTO: 0.01 THOUSAND/UL (ref 0–0.2)
IMM GRANULOCYTES NFR BLD AUTO: 0 % (ref 0–2)
INR PPP: 1.13 (ref 0.84–1.19)
LYMPHOCYTES # BLD AUTO: 2.16 THOUSANDS/ΜL (ref 0.6–4.47)
LYMPHOCYTES NFR BLD AUTO: 31 % (ref 14–44)
MCH RBC QN AUTO: 28.8 PG (ref 26.8–34.3)
MCHC RBC AUTO-ENTMCNC: 33.3 G/DL (ref 31.4–37.4)
MCV RBC AUTO: 86 FL (ref 82–98)
MONOCYTES # BLD AUTO: 0.5 THOUSAND/ΜL (ref 0.17–1.22)
MONOCYTES NFR BLD AUTO: 7 % (ref 4–12)
NEUTROPHILS # BLD AUTO: 4.16 THOUSANDS/ΜL (ref 1.85–7.62)
NEUTS SEG NFR BLD AUTO: 59 % (ref 43–75)
NRBC BLD AUTO-RTO: 0 /100 WBCS
PLATELET # BLD AUTO: 202 THOUSANDS/UL (ref 149–390)
PMV BLD AUTO: 10.9 FL (ref 8.9–12.7)
POTASSIUM SERPL-SCNC: 3.9 MMOL/L (ref 3.5–5.3)
PROTHROMBIN TIME: 14.4 SECONDS (ref 11.6–14.5)
RBC # BLD AUTO: 5.38 MILLION/UL (ref 3.88–5.62)
SODIUM SERPL-SCNC: 142 MMOL/L (ref 136–145)
WBC # BLD AUTO: 6.99 THOUSAND/UL (ref 4.31–10.16)

## 2021-06-09 PROCEDURE — 70496 CT ANGIOGRAPHY HEAD: CPT

## 2021-06-09 PROCEDURE — G1004 CDSM NDSC: HCPCS

## 2021-06-09 PROCEDURE — 96360 HYDRATION IV INFUSION INIT: CPT

## 2021-06-09 PROCEDURE — 96361 HYDRATE IV INFUSION ADD-ON: CPT

## 2021-06-09 PROCEDURE — 99285 EMERGENCY DEPT VISIT HI MDM: CPT | Performed by: PHYSICIAN ASSISTANT

## 2021-06-09 PROCEDURE — 80048 BASIC METABOLIC PNL TOTAL CA: CPT | Performed by: PHYSICIAN ASSISTANT

## 2021-06-09 PROCEDURE — 85730 THROMBOPLASTIN TIME PARTIAL: CPT | Performed by: PHYSICIAN ASSISTANT

## 2021-06-09 PROCEDURE — 36415 COLL VENOUS BLD VENIPUNCTURE: CPT | Performed by: PHYSICIAN ASSISTANT

## 2021-06-09 PROCEDURE — 85025 COMPLETE CBC W/AUTO DIFF WBC: CPT | Performed by: PHYSICIAN ASSISTANT

## 2021-06-09 PROCEDURE — 85610 PROTHROMBIN TIME: CPT | Performed by: PHYSICIAN ASSISTANT

## 2021-06-09 PROCEDURE — 99284 EMERGENCY DEPT VISIT MOD MDM: CPT

## 2021-06-09 PROCEDURE — 70498 CT ANGIOGRAPHY NECK: CPT

## 2021-06-09 RX ORDER — TOPIRAMATE 50 MG/1
50 TABLET, FILM COATED ORAL
Qty: 14 TABLET | Refills: 0 | Status: SHIPPED | OUTPATIENT
Start: 2021-06-09 | End: 2021-06-14 | Stop reason: SDUPTHER

## 2021-06-09 RX ADMIN — SODIUM CHLORIDE 1000 ML: 0.9 INJECTION, SOLUTION INTRAVENOUS at 11:36

## 2021-06-09 RX ADMIN — IOHEXOL 85 ML: 350 INJECTION, SOLUTION INTRAVENOUS at 12:30

## 2021-06-09 NOTE — ED PROVIDER NOTES
History  Chief Complaint   Patient presents with    Headache     right sided headed for about a month ( dull pain) last night the pain became severe  60year old male hx afib on eliquis, HTN, HLD, CVA presents with headache x1 month  He reports having a dull R sided headache for the past month which acutely worsened last night  His headache radiates from the base of his neck up towards his R frontal head  His headaches wake him up from sleep  He initially thought it was due to his allergies/sinuses  He is complaint with medications  He was concerned as the location of the headache is the same spot of his CVA he had discovered in January for which he declined tpa  No photo/phonophobia  No fever, chills, cold symptoms, chest pain, difficulty breathing, shortness of breath  No visual disturbances  No numbness or tingling  No facial droop, slurred speech  No weakness or difficulty ambulating  No abdominal pain, nausea, vomiting, diarrhea, constipation  Prior to Admission Medications   Prescriptions Last Dose Informant Patient Reported? Taking?    apixaban (ELIQUIS) 5 mg 6/9/2021 at Unknown time  No Yes   Sig: Take 1 tablet (5 mg total) by mouth 2 (two) times a day   aspirin 81 mg chewable tablet 6/9/2021 at Unknown time  No Yes   Sig: Chew 1 tablet (81 mg total) daily   atorvastatin (LIPITOR) 80 mg tablet 6/8/2021 at Unknown time  No Yes   Sig: Take 1 tablet (80 mg total) by mouth daily with dinner   metoprolol tartrate (LOPRESSOR) 25 mg tablet 6/9/2021 at Unknown time  No Yes   Sig: TAKE 1 TABLET (25 MG TOTAL) BY MOUTH EVERY 12 (TWELVE) HOURS   metoprolol tartrate (LOPRESSOR) 50 mg tablet 6/8/2021 at Unknown time  No Yes   Sig: Take 1 tablet (50 mg total) by mouth daily in the early morning      Facility-Administered Medications: None       Past Medical History:   Diagnosis Date    Atrial fibrillation (HCC)     Hypertension     Paroxysmal [facility] fibrillation ([facility]     Stroke ([facility]     Valvular heart disease        Past Surgical History:   Procedure Laterality Date    HERNIA REPAIR      KNEE SURGERY         Family History   Problem Relation Age of Onset    No Known Problems Mother     Stroke Father     Heart failure Brother         heart problems    Brain cancer Brother          0     I have reviewed and agree with the history as documented  E-Cigarette/Vaping    E-Cigarette Use Never User      E-Cigarette/Vaping Substances    Nicotine No     THC No     CBD No     Flavoring No     Other No     Unknown No      Social History     Tobacco Use    Smoking status: Never Smoker    Smokeless tobacco: Never Used   Substance Use Topics    Alcohol use: Yes     Alcohol/week: 6 0 standard drinks     Types: 6 Cans of beer per week     Frequency: 2-3 times a week     Drinks per session: 1 or 2     Binge frequency: Monthly    Drug use: No       Review of Systems   Constitutional: Negative for chills and fever  HENT: Negative for sneezing and sore throat  Eyes: Negative for photophobia and visual disturbance  Respiratory: Negative for cough and shortness of breath  Cardiovascular: Negative for chest pain, palpitations and leg swelling  Gastrointestinal: Negative for abdominal pain, constipation, diarrhea, nausea and vomiting  Musculoskeletal: Negative for back pain, gait problem, joint swelling and myalgias  Skin: Negative for color change, pallor, rash and wound  Neurological: Positive for headaches  Negative for dizziness, syncope, weakness, light-headedness and numbness  All other systems reviewed and are negative  Physical Exam  Physical Exam  Vitals signs and nursing note reviewed  Constitutional:       General: He is not in acute distress  Appearance: Normal appearance  He is well-developed  He is obese  He is not diaphoretic  HENT:      Head: Normocephalic and atraumatic        Right Ear: Hearing, tympanic membrane, ear canal and external ear normal  Tympanic membrane is not perforated, erythematous, retracted or bulging  Left Ear: Hearing, tympanic membrane, ear canal and external ear normal  Tympanic membrane is not perforated, erythematous, retracted or bulging  Nose: Nose normal       Mouth/Throat:      Lips: Pink  Mouth: Mucous membranes are moist       Pharynx: Oropharynx is clear  Uvula midline  No pharyngeal swelling, oropharyngeal exudate, posterior oropharyngeal erythema or uvula swelling  Eyes:      Extraocular Movements: Extraocular movements intact  Conjunctiva/sclera: Conjunctivae normal       Pupils: Pupils are equal, round, and reactive to light  Neck:      Musculoskeletal: Normal range of motion  Cardiovascular:      Rate and Rhythm: Normal rate and regular rhythm  Pulses: Normal pulses  Heart sounds: Normal heart sounds  No murmur  No friction rub  No gallop  Pulmonary:      Effort: Pulmonary effort is normal  No respiratory distress  Breath sounds: Normal breath sounds  No stridor  No wheezing or rales  Comments: Sp02 is 98% indicating adequate oxygenation on room air  Musculoskeletal: Normal range of motion  Skin:     General: Skin is warm and dry  Capillary Refill: Capillary refill takes less than 2 seconds  Coloration: Skin is not pale  Findings: No erythema or rash  Neurological:      General: No focal deficit present  Mental Status: He is alert and oriented to person, place, and time  Mental status is at baseline  GCS: GCS eye subscore is 4  GCS verbal subscore is 5  GCS motor subscore is 6  Cranial Nerves: Cranial nerves are intact  No cranial nerve deficit  Sensory: Sensation is intact  No sensory deficit  Motor: Motor function is intact  No weakness  Coordination: Coordination is intact  Coordination normal       Gait: Gait is intact  Gait normal       Comments: No signs of ataxia   Good finger to nose, heel to shin, rapid alternating movements    Upper and lower extremity strength and sensation 5/5 bilaterally  No facial droop or slurred speech         Vital Signs  ED Triage Vitals   Temperature Pulse Respirations Blood Pressure SpO2   06/09/21 1101 06/09/21 1101 06/09/21 1101 06/09/21 1101 06/09/21 1101   97 8 °F (36 6 °C) 59 20 156/73 97 %      Temp src Heart Rate Source Patient Position - Orthostatic VS BP Location FiO2 (%)   -- 06/09/21 1423 -- -- --    Monitor         Pain Score       --                  Vitals:    06/09/21 1101 06/09/21 1423   BP: 156/73 147/72   Pulse: 59 (!) 53         Visual Acuity      ED Medications  Medications   sodium chloride 0 9 % bolus 1,000 mL (0 mL Intravenous Stopped 6/9/21 1425)   iohexol (OMNIPAQUE) 350 MG/ML injection (SINGLE-DOSE) 100 mL (85 mL Intravenous Given 6/9/21 1230)       Diagnostic Studies  Results Reviewed     Procedure Component Value Units Date/Time    Protime-INR [[id number]]  (Normal) Collected: 06/09/21 1135    Lab Status: Final result Specimen: Blood from Arm, Left Updated: 06/09/21 1158     Protime 14 4 seconds      INR 1 13    APTT [[id number]]  (Normal) Collected: 06/09/21 1135    Lab Status: Final result Specimen: Blood from Arm, Left Updated: 06/09/21 1158     PTT 34 seconds     Basic metabolic panel [[id number]] Collected: 06/09/21 1135    Lab Status: Final result Specimen: Blood from Arm, Left Updated: 06/09/21 1157     Sodium 142 mmol/L      Potassium 3 9 mmol/L      Chloride 105 mmol/L      CO2 27 mmol/L      ANION GAP 10 mmol/L      BUN 15 mg/dL      Creatinine 0 74 mg/dL      Glucose 113 mg/dL      Calcium 9 0 mg/dL      eGFR 97 ml/min/1 73sq m     Narrative:      Ml guidelines for Chronic Kidney Disease (CKD):     Stage 1 with normal or high GFR (GFR > 90 mL/min/1 73 square meters)    Stage 2 Mild CKD (GFR = 60-89 mL/min/1 73 square meters)    Stage 3A Moderate CKD (GFR = 45-59 mL/min/1 73 square meters)    Stage 3B Moderate CKD (GFR = 30-44 mL/min/1 73 square meters)    Stage 4 Severe CKD (GFR = 15-29 mL/min/1 73 square meters)    Stage 5 End Stage CKD (GFR <15 mL/min/1 73 square meters)  Note: GFR calculation is accurate only with a steady state creatinine    CBC and differential [[id number]] Collected: 06/09/21 1134    Lab Status: Final result Specimen: Blood from Arm, Left Updated: 06/09/21 1147     WBC 6 99 Thousand/uL      RBC 5 38 Million/uL      Hemoglobin 15 5 g/dL      Hematocrit 46 5 %      MCV 86 fL      MCH 28 8 pg      MCHC 33 3 g/dL      RDW 12 8 %      MPV 10 9 fL      Platelets 003 Thousands/uL      nRBC 0 /100 WBCs      Neutrophils Relative 59 %      Immat GRANS % 0 %      Lymphocytes Relative 31 %      Monocytes Relative 7 %      Eosinophils Relative 2 %      Basophils Relative 1 %      Neutrophils Absolute 4 16 Thousands/µL      Immature Grans Absolute 0 01 Thousand/uL      Lymphocytes Absolute 2 16 Thousands/µL      Monocytes Absolute 0 50 Thousand/µL      Eosinophils Absolute 0 12 Thousand/µL      Basophils Absolute 0 04 Thousands/µL                  CTA head and neck with and without contrast   Final Result by Jeison Castellanos MD (06/09 1315)      No acute intracranial disease  Evolution of infarct identified within the right the frontal operculum since initial detection 1/7/2021  No large vessel flow restrictive disease within the visualized head or neck  This is an exam limited by several factors noted above  Workstation performed: [id number]                    Procedures  Procedures         ED Course  ED Course as of Jun 09 2144 Wed Jun 09, 2021   1335 Paged neurology to discuss CTA      1344 Discussed case with Dr Carly Turpin who advised to start on verapamil 120mg nightly and follow up outpatient  No further intervention/admission required at this time  1445 Low HR, already on metoprolol  Discussed w/Dr Carly Turpin who advised to start on topamax instead  No history of kidney stones or glaucoma   Patient agreeable to trial and will call neuro office for follow up and additional prescription as needed  Pulse(!): 53                                           MDM  Number of Diagnoses or Management Options  Headache:   Diagnosis management comments: Discussed case and imaging with patient's neurologist Dr Lou Miner who advised to start on topamax  Patient to follow up with neurology   Gave patient proper education regarding diagnosis  Answered all questions  Return to ED for any worsening of symptoms otherwise follow up with primary care physician for re-evaluation  Discussed plan with patient who verbalized understanding and agreed to plan  Amount and/or Complexity of Data Reviewed  Clinical lab tests: reviewed and ordered  Tests in the radiology section of CPT®: reviewed and ordered  Tests in the medicine section of CPT®: ordered and reviewed  Discussion of test results with the performing providers: yes  Review and summarize past medical records: yes  Discuss the patient with other providers: yes  Independent visualization of images, tracings, or specimens: yes        Disposition  Final diagnoses:   Headache     Time reflects when diagnosis was documented in both MDM as applicable and the Disposition within this note     Time User Action Codes Description Comment    6/9/2021  2:29 PM Miguelina Dixon Add [R51 9] Headache       ED Disposition     ED Disposition Condition Date/Time Comment    Discharge Stable Wed Jun 9, 2021  2:29 PM Aakash Enriquez discharge to home/self care              Follow-up Information     Follow up With Specialties Details Why Contact Info Additional Information    Ne Naranjo MD Neurology Call today to make follow up appointment for headache and further medication management [address] [address] [address] [facility] Emergency Department Emergency Medicine Go to  As needed [address] [place] [address]  [phone] [facility] Emergency Department, [place], [place], [address]          Discharge Medication List as of 6/9/2021  2:47 PM      START taking these medications    Details   topiramate (TOPAMAX) 50 MG tablet Take 1 tablet (50 mg total) by mouth daily at bedtime for 14 days, Starting Wed 6/9/2021, Until Wed 6/23/2021, Normal         CONTINUE these medications which have NOT CHANGED    Details   apixaban (ELIQUIS) 5 mg Take 1 tablet (5 mg total) by mouth 2 (two) times a day, Starting Thu 4/15/2021, Normal      aspirin 81 mg chewable tablet Chew 1 tablet (81 mg total) daily, Starting Fri 1/8/2021, Normal      atorvastatin (LIPITOR) 80 mg tablet Take 1 tablet (80 mg total) by mouth daily with dinner, Starting Thu 4/15/2021, Normal      !! metoprolol tartrate (LOPRESSOR) 25 mg tablet TAKE 1 TABLET (25 MG TOTAL) BY MOUTH EVERY 12 (TWELVE) HOURS, Starting Fri 5/21/2021, Normal      !! metoprolol tartrate (LOPRESSOR) 50 mg tablet Take 1 tablet (50 mg total) by mouth daily in the early morning, Starting Tue 1/19/2021, Normal       !! - Potential duplicate medications found  Please discuss with provider  No discharge procedures on file      PDMP Review     None          ED Provider  Electronically Signed by           Molly Duque PA-C  06/09/21 2250

## 2021-06-11 ENCOUNTER — TELEPHONE (OUTPATIENT)
Dept: NEUROLOGY | Facility: CLINIC | Age: 66
End: 2021-06-11

## 2021-06-11 DIAGNOSIS — R51.9 HEADACHE: ICD-10-CM

## 2021-06-11 NOTE — TELEPHONE ENCOUNTER
Pt's wife Madelin Sprague) calls in to state that pt was seen in ED for headache and started on Topamax  Per ED notes- case was discussed with Dr Luzma Harley Im states that the ED doctor said pt should be seen for follow up by Dr Luzma COLEMAN due to history of stroke and starting a new medication  Soonest available appt is 8/3 which pt is scheduled for  Ethan Greenwood also does not have anything available sooner  Pt is on waitlist     Is there a concern/new for sooner follow up with this patient? Is the 8/3 appt okay? He reports no issues with topamax at this time  Please advise

## 2021-06-14 RX ORDER — TOPIRAMATE 50 MG/1
50 TABLET, FILM COATED ORAL
Qty: 30 TABLET | Refills: 1 | Status: SHIPPED | OUTPATIENT
Start: 2021-06-14 | End: 2021-06-21 | Stop reason: ALTCHOICE

## 2021-06-14 NOTE — TELEPHONE ENCOUNTER
Left detailed message making [address] [facility] aware  Pt was given a 14 day supply of topamax in the ED  Pt will need a refill before he is seen 8/3  Are you agreeable to refilling? If so, script pending

## 2021-06-18 ENCOUNTER — TELEPHONE (OUTPATIENT)
Dept: NEUROLOGY | Facility: CLINIC | Age: 66
End: 2021-06-18

## 2021-06-18 NOTE — TELEPHONE ENCOUNTER
ADD ON - patient has been scheduled with Dr Aquino Host on 6/21 @ 2:30 - insurance is still in force

## 2021-06-21 ENCOUNTER — OFFICE VISIT (OUTPATIENT)
Dept: NEUROLOGY | Facility: CLINIC | Age: 66
End: 2021-06-21
Payer: COMMERCIAL

## 2021-06-21 VITALS
BODY MASS INDEX: 34.06 KG/M2 | SYSTOLIC BLOOD PRESSURE: 148 MMHG | HEART RATE: 86 BPM | WEIGHT: 257 LBS | DIASTOLIC BLOOD PRESSURE: 73 MMHG | HEIGHT: 73 IN

## 2021-06-21 DIAGNOSIS — R51.9 CHRONIC DAILY HEADACHE: ICD-10-CM

## 2021-06-21 DIAGNOSIS — I69.30 CHRONIC ISCHEMIC RIGHT MCA STROKE: Primary | ICD-10-CM

## 2021-06-21 DIAGNOSIS — Z86.73 STATUS POST STROKE: ICD-10-CM

## 2021-06-21 PROCEDURE — 99215 OFFICE O/P EST HI 40 MIN: CPT | Performed by: PSYCHIATRY & NEUROLOGY

## 2021-06-21 NOTE — PROGRESS NOTES
Return NeuroOutpatient Note        Jasmin Sosa  [id number]  60 y o   1955       CC: stroke, headaches       History obtained from:  Patient and wife     HPI/Subjective:    Jasmin Sosa is 61 yo M with PMH of stroke presents as f/u  Per my previous history, patient was admitted in early Jan for dysarthria  He was found to have acute right MCA ischemic stroke and active A fib  He was supposed to be on [facility] however for some reason it was d/c  Patient was also found to have right M2/M3 occlusion  He was started on eliquis, lipitor 80mg and aspirin 81mg for the occlusion  Patient's wife says that he can get loss of train of thought at times and loses his words  Since the stroke, he constantly gets various intensity headache all the time       Patient had presented recently for headaches  He had repeat CTA head and neck and was negative  We had added topamax but he gets side effects and it hasn't helped  Patient used to be on verapamil 240mg which was d/c during hospitalization and switched with metoprolol  Past Medical History:   Diagnosis Date    Atrial fibrillation (HCC)     Hypertension     Paroxysmal atrial fibrillation (HCC)     Stroke ([facility]     Valvular heart disease      Social History     Socioeconomic History    Marital status: /Civil Union     Spouse name: Not on file    Number of children: Not on file    Years of education: Not on file    Highest education level: Not on file   Occupational History    Not on file   Tobacco Use    Smoking status: Never Smoker    Smokeless tobacco: Never Used   Vaping Use    Vaping Use: Never used   Substance and Sexual Activity    Alcohol use:  Yes     Alcohol/week: 6 0 standard drinks     Types: 6 Cans of beer per week    Drug use: No    Sexual activity: Not on file     Comment: not asked   Other Topics Concern    Not on file   Social History Narrative    Not on file     Social Determinants of Health     Financial Resource Strain:     Difficulty of Paying Living Expenses:    Food Insecurity:     Worried About Running Out of Food in the Last Year:     [address] in the Last Year:    Transportation Needs:     Lack of Transportation (Medical):  Lack of Transportation (Non-Medical):    Physical Activity:     Days of Exercise per Week:     Minutes of Exercise per Session:    Stress:     Feeling of Stress :    Social Connections:     Frequency of Communication with Friends and Family:     Frequency of Social Gatherings with Friends and Family:     Attends Mandae[employer] Services:     Active Member of Clubs or Organizations:     Attends Club or Organization Meetings:     Marital Status:    Intimate Partner Violence:     Fear of Current or Ex-Partner:     Emotionally Abused:     Physically Abused:     Sexually Abused:      Family History   Problem Relation Age of Onset    No Known Problems Mother     Stroke Father     Heart failure Brother         heart problems    Brain cancer Brother               No Known Allergies  Current Outpatient Medications on File Prior to Visit   Medication Sig Dispense Refill    apixaban (ELIQUIS) 5 mg Take 1 tablet (5 mg total) by mouth 2 (two) times a day 180 tablet 3    aspirin 81 mg chewable tablet Chew 1 tablet (81 mg total) daily 30 tablet 0    atorvastatin (LIPITOR) 80 mg tablet Take 1 tablet (80 mg total) by mouth daily with dinner 90 tablet 3    metoprolol tartrate (LOPRESSOR) 50 mg tablet Take 1 tablet (50 mg total) by mouth daily in the early morning 90 tablet 2    [DISCONTINUED] topiramate (TOPAMAX) 50 MG tablet Take 1 tablet (50 mg total) by mouth daily at bedtime 30 tablet 1    metoprolol tartrate (LOPRESSOR) 25 mg tablet TAKE 1 TABLET (25 MG TOTAL) BY MOUTH EVERY 12 (TWELVE) HOURS (Patient taking differently: Take 25 mg by mouth daily at bedtime ) 180 tablet 1     No current facility-administered medications on file prior to visit           Review of Systems   Refer to positive review of systems in HPI  Review of Systems    Constitutional- No fever  Eyes- No visual change  ENT- Hearing normal  CV- No chest pain  Resp- No Shortness of breath  GI- No diarrhea  - Bladder normal  MS- No Arthritis   Skin- No rash  Psych- No depression  Endo- No DM  Heme- No nodes    Vitals:    06/21/21 1443   BP: 148/73   BP Location: Right arm   Patient Position: Sitting   Cuff Size: Adult   Pulse: 86   Weight: 117 kg (257 lb)   Height: 6' 1" (1 854 m)       PHYSICAL EXAM:  Appearance: No Acute Distress  Ophthalmoscopic: Disc Flat, Normal fundus  Mental status:  Orientation: Awake, Alert, and Orientedx3  Memory: Registation 3/3 Recall 3/3  Attention: normal  Knowledge: good  Language: No aphasia  Speech: No dysarthria  Cranial Nerves:  2 No Visual Defect on Confrontation, Pupils round, equal, reactive to light  3,4,6 Extraocular Movements Intact, no nystagmus  5 Facial Sensation Intact  7 No facial asymmetry  8 Intact hearing  9,10 Palate symmetric, normal gag  11 Good shoulder shrug  12 Tongue Midline  Gait: Stable  Coordination: No ataxia with finger to nose testing, and heel to shin  Sensory: Intact, Symmetric to pinprick, light touch, vibration, and joint position  Muscle Tone: Normal              Muscle exam:  Arm Right Left Leg Right Left   Deltoid 5/5 5/5 Iliopsoas 5/5 5/5   Biceps 5/5 5/5 Quads 5/5 5/5   Triceps 5/5 5/5 Hamstrings 5/5 5/5   Wrist Extension 5/5 5/5 Ankle Dorsi Flexion 5/5 5/5   Wrist Flexion 5/5 5/5 Ankle Plantar Flexion 5/5 5/5   Interossei 5/5 5/5 Ankle Eversion 5/5 5/5   APB 5/5 5/5 Ankle Inversion 5/5 5/5       Reflexes   RJ BJ TJ KJ AJ Plantars Carrasco's   Right 2+ 2+ 2+ 2+ 2+ Downgoing Not present   Left 2+ 2+ 2+ 2+ 2+ Downgoing Not present     Personal review of  Labs:                  Diagnoses and all orders for this visit:      1  Chronic ischemic right MCA stroke     2  Chronic daily headache     3   Status post stroke         Physically, patient has not suffered any deficits since stroke however his cognition has declined to some extent and 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  [address]  [address]  [phone]

## 2021-07-14 ENCOUNTER — TELEPHONE (OUTPATIENT)
Dept: NEUROLOGY | Facility: CLINIC | Age: 66
End: 2021-07-14

## 2021-07-14 NOTE — TELEPHONE ENCOUNTER
Division of Disability papers received via fax  All records sent today; confirmation sheet and signed authorization scanned into media

## 2021-07-14 NOTE — TELEPHONE ENCOUNTER
Pt called re status of [employer] paperwork  Pt made aware paperwork has not come in mail yet  Pt is going to have [employer] fax paperwork to office  Fax number given to patient

## 2021-07-20 LAB
ALBUMIN SERPL-MCNC: 4.1 G/DL (ref 3.8–4.8)
ALP SERPL-CCNC: 84 IU/L (ref 48–121)
ALT SERPL-CCNC: 24 IU/L (ref 0–44)
AST SERPL-CCNC: 17 IU/L (ref 0–40)
BILIRUB DIRECT SERPL-MCNC: 0.19 MG/DL (ref 0–0.4)
BILIRUB SERPL-MCNC: 0.6 MG/DL (ref 0–1.2)
CHOLEST SERPL-MCNC: 106 MG/DL (ref 100–199)
HDLC SERPL-MCNC: 35 MG/DL
LDLC SERPL CALC-MCNC: 54 MG/DL (ref 0–99)
PROT SERPL-MCNC: 6.7 G/DL (ref 6–8.5)
SL AMB VLDL CHOLESTEROL CALC: 17 MG/DL (ref 5–40)
TRIGL SERPL-MCNC: 85 MG/DL (ref 0–149)

## 2021-07-21 DIAGNOSIS — I48.91 ATRIAL FIBRILLATION, UNSPECIFIED TYPE (HCC): ICD-10-CM

## 2021-07-21 DIAGNOSIS — I63.9 ACUTE ISCHEMIC STROKE (HCC): ICD-10-CM

## 2021-07-21 RX ORDER — ATORVASTATIN CALCIUM 80 MG/1
80 TABLET, FILM COATED ORAL
Qty: 90 TABLET | Refills: 3 | Status: SHIPPED | OUTPATIENT
Start: 2021-07-21 | End: 2021-07-26 | Stop reason: SDUPTHER

## 2021-07-21 NOTE — TELEPHONE ENCOUNTER
Spoke with patient, message given per Dr Princess Champagne  Patient verbalized understanding  Lab script completed, medication refills eliquis, atorvastatin

## 2021-07-21 NOTE — TELEPHONE ENCOUNTER
----- Message from Rebeka Rain MD sent at 7/20/2021 12:12 PM EDT -----  Please call the patient and inform him that his blood tests showed that both his bad cholesterols are in normal range  Good cholesterol is low  Recommend increase level of cardiovascular exercise and repeat lipid profile and Liver Enzymes/Hepatic Panel in 6 months

## 2021-07-26 ENCOUNTER — OFFICE VISIT (OUTPATIENT)
Dept: CARDIOLOGY CLINIC | Facility: CLINIC | Age: 66
End: 2021-07-26
Payer: COMMERCIAL

## 2021-07-26 ENCOUNTER — TELEPHONE (OUTPATIENT)
Dept: CARDIOLOGY CLINIC | Facility: CLINIC | Age: 66
End: 2021-07-26

## 2021-07-26 VITALS
WEIGHT: 253 LBS | HEART RATE: 50 BPM | OXYGEN SATURATION: 98 % | DIASTOLIC BLOOD PRESSURE: 80 MMHG | TEMPERATURE: 97.5 F | BODY MASS INDEX: 34.27 KG/M2 | RESPIRATION RATE: 18 BRPM | SYSTOLIC BLOOD PRESSURE: 128 MMHG | HEIGHT: 72 IN

## 2021-07-26 DIAGNOSIS — I63.9 ACUTE ISCHEMIC STROKE (HCC): ICD-10-CM

## 2021-07-26 DIAGNOSIS — I35.0 AORTIC STENOSIS, MODERATE: ICD-10-CM

## 2021-07-26 DIAGNOSIS — I48.91 ATRIAL FIBRILLATION, UNSPECIFIED TYPE (HCC): ICD-10-CM

## 2021-07-26 DIAGNOSIS — I48.91 ATRIAL FIBRILLATION, UNSPECIFIED TYPE (HCC): Primary | ICD-10-CM

## 2021-07-26 PROCEDURE — 93000 ELECTROCARDIOGRAM COMPLETE: CPT | Performed by: INTERNAL MEDICINE

## 2021-07-26 PROCEDURE — 99214 OFFICE O/P EST MOD 30 MIN: CPT | Performed by: INTERNAL MEDICINE

## 2021-07-26 RX ORDER — ATORVASTATIN CALCIUM 80 MG/1
80 TABLET, FILM COATED ORAL
Qty: 90 TABLET | Refills: 3 | Status: SHIPPED | OUTPATIENT
Start: 2021-07-26 | End: 2021-10-20 | Stop reason: SDUPTHER

## 2021-07-26 RX ORDER — VERAPAMIL HYDROCHLORIDE 120 MG/1
120 CAPSULE, EXTENDED RELEASE ORAL
COMMUNITY
End: 2021-09-17 | Stop reason: SDUPTHER

## 2021-07-26 NOTE — PROGRESS NOTES
Progress Note - Cardiology Office  [facility]    Obduloi Flores 60 y o  male MRN: [id number]  : 1955  Encounter: [id number]      ASSESSMENT:   Chronic atrial fibrillation:  Asymptomatic  Resting heart rate today is 50 per minute  Normal LV function by echo  Rate controlled   on Eliquis 5 mg b i d  and metoprolol tartrate 50 mg a m  and 25 mg p m  Pharmacologic stress, 2021:      Normal, with EF of 52 %     Moderate aortic stenosis:   ROCÍO 1 1 cm2, mean gradient 20 mm of hg and peak 40 mm of hg  There was mild to moderate regurgitation  Asymptomatic      Echo, 2021:    EF 65%, moderate concentric LVH, mild to moderate YAMILEX,     Moderate AS,ROCÍO 1 1 cm2, mean gradient 20 mm of hg and peak around 40 mm of hg  There was mild to moderate regurgitation        Small ascending aortic aneurysm of 4 cm on echo  CTA:AORTIC ARCH AND GREAT VESSELS:  Normal aortic arch and great vessel origins  Normal visualized subclavian vessels      Hypertension with moderate left Ventricular hypertrophy:    BP today is 128/80 with ventricular rate of 50 on EKG     History of CVA/TIA:    Had facial droop and speech difficulty and was found to have M2 mM3 branch occlusion  Signs and symptoms have subsequently resolved  On Eliquis and aspirin     Dyslipidemia:    On atorvastatin 80 mg  2021:  , HDL 38  2021:  LDL 54, HDL 35, normal liver enzymes     Obesity  BMI 37 81     RECOMMENDATIONS:  Continue Eliquis, aspirin, metoprolol, verapamil and atorvastatin 80 mg     Repeat echocardiogram in 6 months     Holter monitor, 24 hours to evaluate for significant bradycardia and if so will decrease dose of metoprolol     Diet, exercise and weight loss      Patient again declined to undergo cardioversion    Please call [phone] if any questions  HPI :     Obdulio Flores is a 60y o  year old male who came for follow up    He overall feels well except for somewhat decreased energy and exercise tolerance  His headaches have resolved since he has been restarted on verapamil  He denies any chest pain, unusual dyspnea, palpitations or syncope  I again offered him to undergo cardioversion which she declined  His resting ventricular rate on EKG is 50  Therefore we will do the Holter monitor to see if it gets any lower than that in which case we will decrease his metoprolol      REVIEW OF SYSTEMS:  Review of Systems   Constitutional: Positive for fatigue  Cardiovascular: Negative for chest pain and palpitations  Neurological: Negative for syncope  All other systems reviewed and are negative          Historical Information   Past Medical History:   Diagnosis Date    [facility]     Hypertension     Paroxysmal atrial fibrillation (HCC)     Stroke ([facility] )     Valvular heart disease      Past Surgical History:   Procedure Laterality Date    HERNIA REPAIR      KNEE SURGERY       Social History     Substance and Sexual Activity   Alcohol Use Yes    Alcohol/week: 6 0 standard drinks    Types: 6 Cans of beer per week     Social History     Substance and Sexual Activity   Drug Use No     Social History     Tobacco Use   Smoking Status Never Smoker   Smokeless Tobacco Never Used     Family History:   Family History   Problem Relation Age of Onset    No Known Problems Mother     Stroke Father     Heart failure Brother         heart problems    Brain cancer Brother          0       Meds/Allergies     No Known Allergies    Current Outpatient Medications:     apixaban (ELIQUIS) 5 mg, Take 1 tablet (5 mg total) by mouth 2 (two) times a day, Disp: 180 tablet, Rfl: 3    aspirin 81 mg chewable tablet, Chew 1 tablet (81 mg total) daily, Disp: 30 tablet, Rfl: 0    atorvastatin (LIPITOR) 80 mg tablet, Take 1 tablet (80 mg total) by mouth daily with dinner, Disp: 90 tablet, Rfl: 3    metoprolol tartrate (LOPRESSOR) 25 mg tablet, TAKE 1 TABLET (25 MG TOTAL) BY MOUTH EVERY 12 (TWELVE) HOURS (Patient taking differently: Take 25 mg by mouth daily at bedtime ), Disp: 180 tablet, Rfl: 1    metoprolol tartrate (LOPRESSOR) 50 mg tablet, Take 1 tablet (50 mg total) by mouth daily in the early morning, Disp: 90 tablet, Rfl: 2    Vitals:  BP is 128/80 mmHg  Ventricular rate 50/Min on EKG  BP Readings from Last 3 Encounters:   21 148/73   21 147/72   21 112/76       Physical Exam:  Physical Exam    Neurologic:  Alert & oriented x 3, no new focal deficits, Not in any acute distress,  Constitutional:  Obese  Eyes:  Pupil equal and reacting to light, conjunctiva normal,   HENT:  Atraumatic, oropharynx moist, Neck- normal range of motion, no tenderness,  Neck supple, No JVP, No LNP   Respiratory:  Bilateral air entry, mostly clear to auscultation  Cardiovascular: S1-S2, irregularly regular rhythm with a III/VI ejection systolic murmur   GI:  Soft, nondistended, normal bowel sounds, nontender, no hepatosplenomegaly appreciated  Musculoskeletal:  no tenderness, no deformities  Skin:  Well hydrated, no rash   Lymphatic:  No lymphadenopathy noted   Extremities:  Trace edema and distal pulses are present        Diagnostic Studies Review Cardio:      EKG:  Atrial fibrillation with slow ventricular response    Ventricular rate 50 per minute    Cardiac testing:   Results for orders placed during the hospital encounter of 21    Echo complete with contrast if indicated    [address]  [address] [address]  [phone]    Transthoracic Echocardiogram  2D, M-mode, Doppler, and Color Doppler    Study date:  2021    Patient: Marcella Matson  MR number: [id number]  Account number: [de-identified]  : 1955  Age: 60 years  Gender: Male  Status: Outpatient  Location: Bedside  Height: 71 in  Weight: 285 3 lb  BP: 139/ 93 mmHg    Indications: TIA    Diagnoses: G45 9 - Transient cerebral ischemic attack, unspecified    Sonographer:  KELVIN Enamorado  Primary Physician:  Chantel Anthony MD  Referring Physician:  HAILEE Mirza  Group:  [facility]  Interpreting Physician:  Sukumar Dale MD    SUMMARY    PROCEDURE INFORMATION:  This was a technically difficult study  LEFT VENTRICLE:  Systolic function was normal  Ejection fraction was estimated in the range of 60 % to 65 % to be 65 %  Although no diagnostic regional wall motion abnormality was identified, this possibility cannot be completely excluded on the basis of this study  Wall thickness was moderately increased  There was moderate concentric hypertrophy  LEFT ATRIUM:  The atrium was mildly to moderately dilated  ATRIAL SEPTUM:  No good quality bubble study was recorded    RIGHT ATRIUM:  The atrium was mildly to moderately dilated  MITRAL VALVE:  There was mild to moderate annular calcification  There was mild regurgitation  AORTIC VALVE:  The valve was trileaflet  Leaflets exhibited moderately increased thickness, moderate calcification, and moderately reduced cuspal separation  Transaortic velocity was increased due to valvular stenosis  There was moderate stenosis  ROCÍO 1 1 cm2, mean gradient 20 mm of hg and peak around 40 mm of hg  There was mild to moderate regurgitation  TRICUSPID VALVE:  There was trace regurgitation  The tricuspid jet envelope definition was inadequate for estimation of RV systolic pressure  AORTA:  The root exhibited upper normal normal size  Size around 4 cm    HISTORY: PRIOR HISTORY: TIA,A Fib ,HTN,Valvular heart disease  PROCEDURE: The procedure was performed at the bedside  This was a routine study  The transthoracic approach was used  The study included complete 2D imaging, M-mode, complete spectral Doppler, and color Doppler  The heart rate was 107 bpm,  at the start of the study   Images were obtained from the parasternal, apical, subcostal, and suprasternal notch acoustic windows  Intravenous contrast (agitated saline) was administered to evaluate possible R-L intracardiac shunting  This  was a technically difficult study  LEFT VENTRICLE: Size was normal  Systolic function was normal  Ejection fraction was estimated in the range of 60 % to 65 % to be 65 %  Although no diagnostic regional wall motion abnormality was identified, this possibility cannot be  completely excluded on the basis of this study  Wall thickness was moderately increased  There was moderate concentric hypertrophy  DOPPLER: The study was not technically sufficient to allow evaluation of LV diastolic function, due to  atrial fibrillation  RIGHT VENTRICLE: The size was normal  Systolic function was normal     LEFT ATRIUM: The atrium was mildly to moderately dilated  ATRIAL SEPTUM: No good quality bubble study was recorded    RIGHT ATRIUM: The atrium was mildly to moderately dilated  MITRAL VALVE: There was mild to moderate annular calcification  There was mild calcification  There was normal leaflet separation  DOPPLER: The transmitral velocity was within the normal range  There was no evidence for stenosis  There was  mild regurgitation  AORTIC VALVE: The valve was trileaflet  Leaflets exhibited moderately increased thickness, moderate calcification, and moderately reduced cuspal separation  DOPPLER: Transaortic velocity was increased due to valvular stenosis  There was  moderate stenosis  ROCÍO 1 1 cm2, mean gradient 20 mm of hg and peak around 40 mm of hg  There was mild to moderate regurgitation  TRICUSPID VALVE: DOPPLER: There was trace regurgitation  The tricuspid jet envelope definition was inadequate for estimation of RV systolic pressure  PULMONIC VALVE: DOPPLER: There was no significant regurgitation  PERICARDIUM: There was no thickening or calcification  There was no pericardial effusion  AORTA: The root exhibited upper normal normal size   Size around 4 cm    SYSTEMIC VEINS: IVC: The inferior vena cava was not well visualized  SYSTEM MEASUREMENT TABLES    2D  EF (Teich): 54 08 %  %FS: 27 98 %  Ao Diam: 3 84 cm  EDV(Teich): 106 84 ml  ESV(Teich): 49 06 ml  IVSd: 1 36 cm  LA Area: 21 24 cm2  LA Diam: 4 39 cm  LVIDd: 4 79 cm  LVIDs: 3 45 cm  LVOT Diam: 2 18 cm  LVPWd: 1 32 cm  RA Area: 27 21 cm2  RVIDd: 4 12 cm  SV (Teich): 57 78 ml    PW  E' Lat: 0 08 m/s  E' Sept: 0 13 m/s  LVOT Env  Ti: 308 28 ms  LVOT VTI: 17 64 cm  LVOT Vmax: 0 89 m/s  LVOT Vmean: 0 57 m/s  LVOT maxPG: 3 19 mmHg  LVOT meanP 51 mmHg    [facility] [employer] [facility] Accredited Echocardiography Laboratory    Prepared and electronically signed by    Honorio Davila MD  Signed 2021 14:54:03    No results found for this or any previous visit  No results found for this or any previous visit  No results found for this or any previous visit  Results for orders placed during the hospital encounter of 21    NM Myocardial Perfusion Spect (Exercise Induced Stress and/or Rest)    Narrative  [address]  [address] [facility] [address] [phone]    [facility]    Patient: Prabha Lee  MR number: [id number]  Account number: [de-identified]  : 1955  Age: 60 years  Gender: Male  Status: Outpatient  Location: Stress lab  Height: 73 in  Weight: 286 lb  BP: 131/ 74 mmHg    Allergies: NO KNOWN ALLERGIES    Primary Physician:  Micaela Priest MD  RN:  Daron Logan RN  Interpreting Physician:  Honorio Davila MD    INDICATIONS: Coronary artery disease  HISTORY: The patient is a 60year old  male  Chest pain status: no chest pain  Coronary artery disease risk factors: dyslipidemia and family history of premature coronary artery disease  Small, acute cortical infarct in right MCA  territory, chronic Afib Medications: a beta blocker, clopidogrel, and a lipid lowering agent  REST ECG: Atrial fibrillation      PROCEDURE: The study was performed in the the Stress lab  A regadenoson infusion pharmacologic stress test was performed  Systolic blood pressure was 131 mmHg, at the start of the study  Diastolic blood pressure was 74 mmHg, at the start  of the study  The heart rate was 75 bpm, at the start of the study  IV double checked  Regadenoson protocol:  Time HR bpm SBP mmHg DBP mmHg Symptoms Rhythm/conduct  Baseline 10:18 75 131 74 none A-fib  Immediate 13:20 -- -- -- -- --  1 min 10:19 101 128 70 mild dyspnea, flushing same as above, occasional PVC's  2 min 10:20 130 135 65 subsiding A-fib, occasional PVC's  3 min 10:21 109 121 60 subsiding A-fib, occasional PVC's  4 min 10:22 103 121 70 subsiding A-fib, occasional PVC's  5 min 10:23 101 119 66 subsiding same as above    STRESS SUMMARY: Duration of pharmacologic stress was 3 min and 0 sec  Maximal heart rate during stress was 131 bpm ( 83 % of maximal predicted heart rate)  The rate-pressure product for the peak heart rate and blood pressure was 61416  There was no chest pain during stress  The stress test was terminated due to protocol completion  The stress ECG was equivocal for ischemia  There were no stress arrhythmias or conduction abnormalities  Patient has chronic atrial  fibrillation  ISOTOPE ADMINISTRATION:  Resting isotope administration Stress isotope administration  Agent Tetrofosmin Tetrofosmin  Dose 11 mCi 32 8 mCi  Injection time 08:25 10:19    The radiopharmaceutical was injected at the peak effect of pharmacologic stress  MYOCARDIAL PERFUSION IMAGING:  The image quality was fair  Rotating projection images reveal mild diaphragmatic attenuation  Left ventricular size was normal  The TID ratio was 1  18  The right ventricle was dilated  PERFUSION DEFECTS:  -  There was a small, mildly severe, fixed myocardial perfusion defect of the apical wall likely  As there appeared normalized on prone images    GATED SPECT:  The calculated left ventricular ejection fraction was 52 %   Left ventricular ejection fraction was within normal limits by visual estimate  There was no left ventricular regional abnormality  SUMMARY:  -  Stress results: Target heart rate was not achieved  There was no chest pain during stress  -  ECG conclusions: The stress ECG was equivocal for ischemia  -  Perfusion imaging: The right ventricle was dilated  There was a small, mildly severe, fixed myocardial perfusion defect of the apical wall likely  As there appeared normalized on prone images  -  Gated SPECT: The calculated left ventricular ejection fraction was 52 %  Left ventricular ejection fraction was within normal limits by visual estimate  There was no left ventricular regional abnormality  IMPRESSIONS: Probably normal study after pharmacologic vasodilation without reproduction of symptoms  EF is 50-55% Myocardial perfusion imaging was normal at rest and with stress  Left ventricular systolic function was normal  Diagnostic  sensitivity was limited by submaximal stress  Prepared and signed by    Yang Fraire MD  Signed 01/07/2021 16:03:47    No results found for this or any previous visit  Imaging:  Chest X-Ray:   No Chest XR results available for this patient  CT-scan of the chest:     CTA head and neck with and without contrast    Result Date: 6/9/2021  Impression No acute intracranial disease  Evolution of infarct identified within the right the frontal operculum since initial detection 1/7/2021  No large vessel flow restrictive disease within the visualized head or neck  This is an exam limited by several factors noted above  Workstation performed: [id number]     CTA head and neck with and without contrast    Result Date: 1/5/2021  Impression Right distal M2/M3 branch occlusion  Cardiomegaly    I personally discussed this study with DR [facility] on 1/5/2021 at 6:11 PM  Workstation performed: [id number]     Lab Review   Lab Results   Component Value Date    WBC 6 99 06/09/2021    HGB 15 5 06/09/2021    HCT 46 5 06/09/2021    MCV 86 06/09/2021    RDW 12 8 06/09/2021     06/09/2021     BMP:  Lab Results   Component Value Date    SODIUM 142 06/09/2021    K 3 9 06/09/2021     06/09/2021    CO2 27 06/09/2021    ANIONGAP 13 0 12/26/2015    BUN 15 06/09/2021    CREATININE 0 74 06/09/2021    GLUC 113 06/09/2021    CALCIUM 9 0 06/09/2021    CORRECTEDCA 8 9 01/05/2021    EGFR 97 06/09/2021    MG 2 1 01/06/2021     LFT:  Lab Results   Component Value Date    AST 17 07/16/2021    ALT 24 07/16/2021    ALKPHOS 63 01/06/2021    TP 6 7 07/16/2021    ALB 4 1 07/16/2021      No components found for: TSH3  No results found for: [facility]  Lab Results   Component Value Date    HGBA1C 5 4 01/06/2021     Lipid Profile:   Lab Results   Component Value Date    CHOLESTEROL 106 07/16/2021    HDL 35 (L) 07/16/2021    LDLCALC 54 07/16/2021    TRIG 85 07/16/2021     Lab Results   Component Value Date    CHOLESTEROL 106 07/16/2021    CHOLESTEROL 186 01/06/2021     Lab Results   Component Value Date    TROPONINI <0 02 01/05/2021     No results found for: NTBNP   Recent Results (from the past 672 hour(s))   Lipid panel    Collection Time: 07/16/21  8:27 AM   Result Value Ref Range    Cholesterol, Total 106 100 - 199 mg/dL    Triglycerides 85 0 - 149 mg/dL    HDL 35 (L) >39 mg/dL    VLDL Cholesterol Calculated 17 5 - 40 mg/dL    LDL Calculated 54 0 - 99 mg/dL   Hepatic function panel    Collection Time: 07/16/21  8:27 AM   Result Value Ref Range    Protein, Total 6 7 6 0 - 8 5 g/dL    Albumin 4 1 3 8 - 4 8 g/dL    TOTAL BILIRUBIN 0 6 0 0 - 1 2 mg/dL    Bilirubin, Direct 0 19 0 00 - 0 40 mg/dL    Alk Phos Isoenzymes 84 48 - 121 IU/L    AST 17 0 - 40 IU/L    ALT 24 0 - 44 IU/L             Dr Sabina Ridley MD, [facility]      "This note has been constructed using a voice recognition system  Therefore there may be syntax, spelling, and/or grammatical errors   Please call if you have any questions  "

## 2021-09-17 DIAGNOSIS — G43.109 MIGRAINE WITH AURA AND WITHOUT STATUS MIGRAINOSUS, NOT INTRACTABLE: Primary | ICD-10-CM

## 2021-09-17 RX ORDER — VERAPAMIL HYDROCHLORIDE 120 MG/1
120 CAPSULE, EXTENDED RELEASE ORAL
Qty: 90 CAPSULE | Refills: 1 | Status: SHIPPED | OUTPATIENT
Start: 2021-09-17 | End: 2022-03-30

## 2021-10-20 ENCOUNTER — TELEMEDICINE (OUTPATIENT)
Dept: NEUROLOGY | Facility: CLINIC | Age: 66
End: 2021-10-20
Payer: COMMERCIAL

## 2021-10-20 VITALS — HEIGHT: 72 IN | BODY MASS INDEX: 34.31 KG/M2

## 2021-10-20 DIAGNOSIS — I48.91 ATRIAL FIBRILLATION, UNSPECIFIED TYPE (HCC): ICD-10-CM

## 2021-10-20 DIAGNOSIS — I69.30 CHRONIC ISCHEMIC RIGHT MCA STROKE: ICD-10-CM

## 2021-10-20 DIAGNOSIS — G44.1 VASCULAR HEADACHE: Primary | ICD-10-CM

## 2021-10-20 DIAGNOSIS — I63.9 ACUTE ISCHEMIC STROKE (HCC): ICD-10-CM

## 2021-10-20 PROCEDURE — 99213 OFFICE O/P EST LOW 20 MIN: CPT | Performed by: PSYCHIATRY & NEUROLOGY

## 2021-10-20 RX ORDER — ATORVASTATIN CALCIUM 80 MG/1
80 TABLET, FILM COATED ORAL
Qty: 90 TABLET | Refills: 3 | Status: SHIPPED | OUTPATIENT
Start: 2021-10-20 | End: 2022-07-20 | Stop reason: SDUPTHER

## 2021-10-25 DIAGNOSIS — I10 ESSENTIAL HYPERTENSION: ICD-10-CM

## 2021-10-26 RX ORDER — METOPROLOL TARTRATE 50 MG/1
50 TABLET, FILM COATED ORAL
Qty: 90 TABLET | Refills: 2 | Status: SHIPPED | OUTPATIENT
Start: 2021-10-26 | End: 2022-07-01

## 2022-03-30 DIAGNOSIS — G43.109 MIGRAINE WITH AURA AND WITHOUT STATUS MIGRAINOSUS, NOT INTRACTABLE: ICD-10-CM

## 2022-03-30 RX ORDER — VERAPAMIL HYDROCHLORIDE 120 MG/1
120 CAPSULE, EXTENDED RELEASE ORAL
Qty: 90 CAPSULE | Refills: 1 | Status: SHIPPED | OUTPATIENT
Start: 2022-03-30

## 2022-05-17 ENCOUNTER — HOSPITAL ENCOUNTER (OUTPATIENT)
Dept: NON INVASIVE DIAGNOSTICS | Facility: HOSPITAL | Age: 67
Discharge: HOME | End: 2022-05-17
Attending: INTERNAL MEDICINE
Payer: MEDICARE

## 2022-05-17 VITALS
BODY MASS INDEX: 34.27 KG/M2 | SYSTOLIC BLOOD PRESSURE: 132 MMHG | DIASTOLIC BLOOD PRESSURE: 81 MMHG | HEART RATE: 78 BPM | WEIGHT: 253 LBS | HEIGHT: 72 IN

## 2022-05-17 DIAGNOSIS — I48.91 ATRIAL FIBRILLATION, UNSPECIFIED TYPE (HCC): ICD-10-CM

## 2022-05-17 DIAGNOSIS — I35.0 AORTIC STENOSIS, MODERATE: ICD-10-CM

## 2022-05-17 PROCEDURE — 93306 TTE W/DOPPLER COMPLETE: CPT

## 2022-05-17 PROCEDURE — 93225 XTRNL ECG REC<48 HRS REC: CPT

## 2022-05-17 PROCEDURE — 93226 XTRNL ECG REC<48 HR SCAN A/R: CPT

## 2022-05-18 LAB
AORTIC ROOT: 3.3 CM
AORTIC VALVE MEAN VELOCITY: 21.2 M/S
AV AREA BY CONTINUOUS VTI: 1.4 CM2
AV AREA PEAK VELOCITY: 1.3 CM2
AV LVOT MEAN GRADIENT: 2 MMHG
AV LVOT PEAK GRADIENT: 4 MMHG
AV MEAN GRADIENT: 21 MMHG
AV PEAK GRADIENT: 37 MMHG
AV REGURGITATION PRESSURE HALF TIME: 654 MS
AV VALVE AREA: 1.35 CM2
AV VELOCITY RATIO: 0.34
DOP CALC AO PEAK VEL: 3.02 M/S
DOP CALC AO VTI: 72.75 CM
DOP CALC LVOT AREA: 3.8 CM2
DOP CALC LVOT DIAMETER: 2.2 CM
DOP CALC LVOT PEAK VEL VTI: 25.93 CM
DOP CALC LVOT PEAK VEL: 1.02 M/S
DOP CALC LVOT STROKE INDEX: 41.7 ML/M2
DOP CALC LVOT STROKE VOLUME: 98.52 CM3
E WAVE DECELERATION TIME: 194 MS
FRACTIONAL SHORTENING: 32 % (ref 28–44)
INTERVENTRICULAR SEPTUM IN DIASTOLE (PARASTERNAL SHORT AXIS VIEW): 1.3 CM
INTERVENTRICULAR SEPTUM: 1.3 CM (ref 0.6–1.1)
LAAS-AP2: 24.7 CM2
LAAS-AP4: 28.8 CM2
LEFT ATRIUM SIZE: 4.4 CM
LEFT INTERNAL DIMENSION IN SYSTOLE: 3.4 CM (ref 2.1–4)
LEFT VENTRICULAR INTERNAL DIMENSION IN DIASTOLE: 5 CM (ref 3.5–6)
LEFT VENTRICULAR POSTERIOR WALL IN END DIASTOLE: 1.3 CM
LEFT VENTRICULAR STROKE VOLUME: 72 ML
LVSV (TEICH): 72 ML
MV E'TISSUE VEL-LAT: 15 CM/S
MV E'TISSUE VEL-SEP: 13 CM/S
MV PEAK E VEL: 114 CM/S
MV STENOSIS PRESSURE HALF TIME: 56 MS
MV VALVE AREA P 1/2 METHOD: 3.93 CM2
RIGHT VENTRICLE ID DIMENSION: 4 CM
SL CV AV DECELERATION TIME RETROGRADE: 2254 MS
SL CV AV PEAK GRADIENT RETROGRADE: 73 MMHG
SL CV LEFT ATRIUM LENGTH A2C: 5.7 CM
SL CV LV EF: 61
SL CV PED ECHO LEFT VENTRICLE DIASTOLIC VOLUME (MOD BIPLANE) 2D: 118 ML
SL CV PED ECHO LEFT VENTRICLE SYSTOLIC VOLUME (MOD BIPLANE) 2D: 46 ML
TR MAX PG: 26 MMHG
TR PEAK VELOCITY: 2.6 M/S
TRICUSPID VALVE PEAK REGURGITATION VELOCITY: 2.57 M/S

## 2022-05-18 PROCEDURE — 93306 TTE W/DOPPLER COMPLETE: CPT | Performed by: INTERNAL MEDICINE

## 2022-05-19 ENCOUNTER — TELEPHONE (OUTPATIENT)
Dept: CARDIOLOGY CLINIC | Facility: CLINIC | Age: 67
End: 2022-05-19

## 2022-05-19 NOTE — TELEPHONE ENCOUNTER
Pt made aware of Echo results  Awaiting Holter Monitor results-may take 7:14 days for uploading and interrogation of the report to process  Pt will reschedule 5/20th appt  until all is avail to discuss

## 2022-05-19 NOTE — TELEPHONE ENCOUNTER
----- Message from Jyotsna Hawthorne MD sent at 5/19/2022 11:39 AM EDT -----  Please call and inform the patient that the Echocardiogram showed normal pumping function of the heart      There is no significant change in the function of the heart or aortic stenosis  Will continue to monitor

## 2022-05-24 PROCEDURE — 93227 XTRNL ECG REC<48 HR R&I: CPT | Performed by: INTERNAL MEDICINE

## 2022-05-25 ENCOUNTER — TELEPHONE (OUTPATIENT)
Dept: CARDIOLOGY CLINIC | Facility: CLINIC | Age: 67
End: 2022-05-25

## 2022-05-25 NOTE — TELEPHONE ENCOUNTER
----- Message from Wallace Elliott MD sent at 5/25/2022 11:41 AM EDT -----  Please call and inform patient that the Holter monitor was reviewed    It showed atrial fibrillation with heart rate ranging from 41  to 128 beats per minute  There were no other significant arrhythmias seen  Patient reported no major symptoms during the monitoring  Will discuss further at next office visit

## 2022-06-29 DIAGNOSIS — I10 ESSENTIAL HYPERTENSION: ICD-10-CM

## 2022-06-29 DIAGNOSIS — I48.91 ATRIAL FIBRILLATION, UNSPECIFIED TYPE (HCC): ICD-10-CM

## 2022-07-01 ENCOUNTER — OFFICE VISIT (OUTPATIENT)
Dept: CARDIOLOGY CLINIC | Facility: CLINIC | Age: 67
End: 2022-07-01
Payer: MEDICARE

## 2022-07-01 VITALS
OXYGEN SATURATION: 97 % | HEART RATE: 76 BPM | DIASTOLIC BLOOD PRESSURE: 70 MMHG | SYSTOLIC BLOOD PRESSURE: 110 MMHG | BODY MASS INDEX: 34.27 KG/M2 | WEIGHT: 253 LBS | HEIGHT: 72 IN | TEMPERATURE: 97 F

## 2022-07-01 DIAGNOSIS — I35.0 AORTIC STENOSIS, MODERATE: ICD-10-CM

## 2022-07-01 DIAGNOSIS — E78.49 HYPERLIPIDEMIA, FAMILIAL, HIGH LDL: ICD-10-CM

## 2022-07-01 DIAGNOSIS — I11.9 HYPERTENSIVE HEART DISEASE WITHOUT HEART FAILURE: ICD-10-CM

## 2022-07-01 DIAGNOSIS — Z86.73 HISTORY OF STROKE: ICD-10-CM

## 2022-07-01 DIAGNOSIS — I48.21 PERMANENT ATRIAL FIBRILLATION (HCC): ICD-10-CM

## 2022-07-01 DIAGNOSIS — E66.9 OBESITY (BMI 30-39.9): ICD-10-CM

## 2022-07-01 PROCEDURE — 99214 OFFICE O/P EST MOD 30 MIN: CPT | Performed by: INTERNAL MEDICINE

## 2022-07-01 PROCEDURE — 93000 ELECTROCARDIOGRAM COMPLETE: CPT | Performed by: INTERNAL MEDICINE

## 2022-07-01 NOTE — PROGRESS NOTES
Progress Note - Cardiology Office  [facility]    Cristofer Ortega 73 y o  male MRN: [id number]  : 1955  Encounter: [id number]      Assessment:     1  Permanent atrial fibrillation ([address] )    2  Hypertensive heart disease without heart failure    3  History of stroke    4  Aortic stenosis, moderate    5  Hyperlipidemia, familial, high LDL    6  Obesity (BMI 30-39  9)        Discussion Summary and Plan:    78-year-old male with history of probably chronic atrial fibrillation as last EKG in  also showing atrial fibrillation, obesity with BMI around 34, history of small ascending aortic aneurysm by CT scan, essential hypertension, cardiac murmur now diagnosed to have moderate aortic stenosis was seen after stroke        1  History of CVA  Patient had history of stroke last year  Fortunately he has recovered most of his function  He is now on antithrombotic therapy  His EKG is consistent with chronic atrial fibrillation          2  Permanent atrial fibrillation  Patient has atrial fibrillation since   He was not taking blood thinners before the stroke he was on aspirin  He has been compliant with Eliquis since his stroke and is doing well       3  Cardiac murmur   Repeat echo Doppler in May 2022 shows he has moderate aortic stenosis mild-to-moderate AI  LV function is normal will continue to monitor  He has no new symptoms       4  Essential hypertension   Blood pressure has been acceptable with atenolol and verapamil  Will check electrolytes       5  Obesity with BMI around 34 he has lost some weight      6  Small ascending aortic aneurysm of 4 1 cm by CT scan  At some point in future he will need repeat CT scan      7  Dyslipidemia  patient is on high intensity statins his cholesterol is 106  He has not had any blood test in 2021  Will get fasting lipids and LFTs checked          Patient and patient's wife was advised and educated to call our office  immediately if patient has any new symptoms of chest pain/shortness of breath, near-syncope, syncope, light headedness sustained palpitations  or any other cardiovascular symptoms before their scheduled follow-up appointment  Office #[phone]  Please call [phone] if any questions  Counseling :  A description of the counseling  Goals and Barriers  Patient's ability to self care: Yes  Medication side effect reviewed with patient in detail and all their questions answered to their satisfaction  HPI :     Crist Boxer is a 73y o  year old male who came for follow up  Patient has been seen in [facility] in the past has medical history significant for probably permanent atrial fibrillation, obesity with BMI around 34, history of small ascending aortic aneurysm by CT scan, essential hypertension, cardiac murmur with moderate aortic stenosis who came for follow-up  Patient was seen by us in January 2021 when he had a stroke  He was noted to have M2/M3 branch occlusion at that time  Nausea no vomiting no fever no chills  He has quit drinking also he has been very active  He came with his wife no issues EKG shows he is in atrial fibrillation heart rate 76 beats per minute  No other cardiovascular problem  His mother had heart problem and older age  No recent surgeries  Review of Systems   Constitutional: Negative for activity change, chills, diaphoresis, fever and unexpected weight change  HENT: Negative for congestion  Eyes: Negative for discharge and redness  Respiratory: Negative for cough, chest tightness, shortness of breath and wheezing  Cardiovascular: Negative  Negative for chest pain, palpitations and leg swelling  Gastrointestinal: Negative for abdominal pain, diarrhea and nausea  Endocrine: Negative  Genitourinary: Negative for decreased urine volume and urgency  Musculoskeletal: Negative  Negative for arthralgias, back pain and gait problem     Skin: Negative for rash and wound  Allergic/Immunologic: Negative  Neurological: Negative for dizziness, seizures, syncope, weakness, light-headedness and headaches  Hematological: Negative  Psychiatric/Behavioral: Negative for agitation and confusion  The patient is not nervous/anxious          Historical Information   Past Medical History:   Diagnosis Date    [facility]     Hypertension     Paroxysmal atrial fibrillation (HCC)     Stroke ([facility] )     Valvular heart disease      Past Surgical History:   Procedure Laterality Date    HERNIA REPAIR      KNEE SURGERY       Social History     Substance and Sexual Activity   Alcohol Use Not Currently    Alcohol/week: 6 0 standard drinks    Types: 6 Cans of beer per week     Social History     Substance and Sexual Activity   Drug Use No     Social History     Tobacco Use   Smoking Status Never Smoker   Smokeless Tobacco Never Used     Family History:   Family History   Problem Relation Age of Onset    No Known Problems Mother     Stroke Father     Heart failure Brother         heart problems    Brain cancer Brother          0       Meds/Allergies     No Known Allergies    Current Outpatient Medications:     apixaban (ELIQUIS) 5 mg, Take 1 tablet (5 mg total) by mouth 2 (two) times a day, Disp: 180 tablet, Rfl: 3    aspirin 81 mg chewable tablet, Chew 1 tablet (81 mg total) daily, Disp: 30 tablet, Rfl: 0    atorvastatin (LIPITOR) 80 mg tablet, Take 1 tablet (80 mg total) by mouth daily with dinner, Disp: 90 tablet, Rfl: 3    metoprolol tartrate (LOPRESSOR) 25 mg tablet, Take 1 tablet (25 mg total) by mouth daily at bedtime (Patient taking differently: Take 25 mg by mouth every 12 (twelve) hours), Disp: 90 tablet, Rfl: 2    verapamil (VERELAN PM) 120 MG 24 hr capsule, TAKE 1 CAPSULE (120 MG TOTAL) BY MOUTH DAILY AT BEDTIME, Disp: 90 capsule, Rfl: 1    Vitals: Blood pressure 110/70, pulse 76, temperature (!) 97 °F (36 1 °C), height 6' (1 829 m), weight 115 kg (253 lb), SpO2 97 %  ?  Body mass index is 34 31 kg/m²  Wt Readings from Last 3 Encounters:   07/01/22 115 kg (253 lb)   05/17/22 115 kg (253 lb)   07/26/21 115 kg (253 lb)     Vitals:    07/01/22 1448   Weight: 115 kg (253 lb)     BP Readings from Last 3 Encounters:   07/01/22 110/70   05/17/22 132/81   07/26/21 128/80       Physical Exam:  Neurologic:  Alert & oriented x 3, no new focal deficits, Not in any acute distress,  Constitutional:  Adequate built, non-toxic appearance   Neck: Normal range of motion, no tenderness,  Neck supple   Respiratory:  Bilateral air entry, mostly clear to auscultation  Cardiovascular:  S1-S2 irregularly irregular with 3/6 as murmur  GI:  Soft, nondistended,  nontender, no hepatosplenomegaly appreciated  Musculoskeletal:  No edema, no tenderness, no deformities  Skin:  Well hydrated, no rash   Extremities:  No edema and distal pulses are present  Psychiatric:  Speech and behavior appropriate         Diagnostic Studies Review Cardio:    Echo Doppler done in May 2022 shows patient has normal LV systolic function EF 07%, mild AI moderate aortic stenosis, aortic valve area was thought to be 1 1 cm2 mean gradient 20 mmHg  There was mild-to-moderate AI  Small ascending aortic aneurysm 4 cm noted    Nuclear stress test   Nuclear stress test done in January 2021 shows EF 50 55%  Micro perfusion was normal     Holter monitor in May 2022, shows rhythm is atrial fibrillation throughout the studies  Few PACs and PVCs noted average heart rate 68 beats per minute  EKG:  Twelve lead EKG 07/01/2022 shows atrial fibrillation heart rate 71 beats per minute  No other significant abnormality    Cardiac testing:   Results for orders placed during the hospital encounter of 01/05/21    Echo complete with contrast if indicated    Narrative  [address]  [address], [place] [address]  [phone]    Transthoracic Echocardiogram  2D, M-mode, Doppler, and Color Doppler    Study date:  2021    Patient: Prince Simmons  MR number: [id number]  Account number: [de-identified]  : 1955  Age: 60 years  Gender: Male  Status: Outpatient  Location: Bedside  Height: 71 in  Weight: 285 3 lb  BP: 139/ 93 mmHg    Indications: TIA    Diagnoses: G45 9 - Transient cerebral ischemic attack, unspecified    Sonographer:  KELVIN Dozier  Primary Physician:  Inga Jones MD  Referring Physician:  HAILEE Mcmillan  Group:  [facility]  Interpreting Physician:  Igor Vasquez MD    SUMMARY    PROCEDURE INFORMATION:  This was a technically difficult study  LEFT VENTRICLE:  Systolic function was normal  Ejection fraction was estimated in the range of 60 % to 65 % to be 65 %  Although no diagnostic regional wall motion abnormality was identified, this possibility cannot be completely excluded on the basis of this study  Wall thickness was moderately increased  There was moderate concentric hypertrophy  LEFT ATRIUM:  The atrium was mildly to moderately dilated  ATRIAL SEPTUM:  No good quality bubble study was recorded    RIGHT ATRIUM:  The atrium was mildly to moderately dilated  MITRAL VALVE:  There was mild to moderate annular calcification  There was mild regurgitation  AORTIC VALVE:  The valve was trileaflet  Leaflets exhibited moderately increased thickness, moderate calcification, and moderately reduced cuspal separation  Transaortic velocity was increased due to valvular stenosis  There was moderate stenosis  ROCÍO 1 1 cm2, mean gradient 20 mm of hg and peak around 40 mm of hg  There was mild to moderate regurgitation  TRICUSPID VALVE:  There was trace regurgitation  The tricuspid jet envelope definition was inadequate for estimation of RV systolic pressure  AORTA:  The root exhibited upper normal normal size   Size around 4 cm    HISTORY: PRIOR HISTORY: TIA,A Fib ,HTN,Valvular heart disease  PROCEDURE: The procedure was performed at the bedside  This was a routine study  The transthoracic approach was used  The study included complete 2D imaging, M-mode, complete spectral Doppler, and color Doppler  The heart rate was 107 bpm,  at the start of the study  Images were obtained from the parasternal, apical, subcostal, and suprasternal notch acoustic windows  Intravenous contrast (agitated saline) was administered to evaluate possible R-L intracardiac shunting  This  was a technically difficult study  LEFT VENTRICLE: Size was normal  Systolic function was normal  Ejection fraction was estimated in the range of 60 % to 65 % to be 65 %  Although no diagnostic regional wall motion abnormality was identified, this possibility cannot be  completely excluded on the basis of this study  Wall thickness was moderately increased  There was moderate concentric hypertrophy  DOPPLER: The study was not technically sufficient to allow evaluation of LV diastolic function, due to  atrial fibrillation  RIGHT VENTRICLE: The size was normal  Systolic function was normal     LEFT ATRIUM: The atrium was mildly to moderately dilated  ATRIAL SEPTUM: No good quality bubble study was recorded    RIGHT ATRIUM: The atrium was mildly to moderately dilated  MITRAL VALVE: There was mild to moderate annular calcification  There was mild calcification  There was normal leaflet separation  DOPPLER: The transmitral velocity was within the normal range  There was no evidence for stenosis  There was  mild regurgitation  AORTIC VALVE: The valve was trileaflet  Leaflets exhibited moderately increased thickness, moderate calcification, and moderately reduced cuspal separation  DOPPLER: Transaortic velocity was increased due to valvular stenosis  There was  moderate stenosis  ROCÍO 1 1 cm2, mean gradient 20 mm of hg and peak around 40 mm of hg  There was mild to moderate regurgitation      TRICUSPID VALVE: DOPPLER: There was trace regurgitation  The tricuspid jet envelope definition was inadequate for estimation of RV systolic pressure  PULMONIC VALVE: DOPPLER: There was no significant regurgitation  PERICARDIUM: There was no thickening or calcification  There was no pericardial effusion  AORTA: The root exhibited upper normal normal size  Size around 4 cm    SYSTEMIC VEINS: IVC: The inferior vena cava was not well visualized  SYSTEM MEASUREMENT TABLES    2D  EF (Teich): 54 08 %  %FS: 27 98 %  Ao Diam: 3 84 cm  EDV(Teich): 106 84 ml  ESV(Teich): 49 06 ml  IVSd: 1 36 cm  LA Area: 21 24 cm2  LA Diam: 4 39 cm  LVIDd: 4 79 cm  LVIDs: 3 45 cm  LVOT Diam: 2 18 cm  LVPWd: 1 32 cm  RA Area: 27 21 cm2  RVIDd: 4 12 cm  SV (Teich): 57 78 ml    PW  E' Lat: 0 08 m/s  E' Sept: 0 13 m/s  LVOT Env  Ti: 308 28 ms  LVOT VTI: 17 64 cm  LVOT Vmax: 0 89 m/s  LVOT Vmean: 0 57 m/s  LVOT maxPG: 3 19 mmHg  LVOT meanP 51 mmHg    [facility] Accredited Echocardiography Laboratory    Prepared and electronically signed by    Samuel Jacobs MD  Signed 2021 14:54:03      Results for orders placed during the hospital encounter of 21    NM Myocardial Perfusion Spect (Exercise Induced Stress and/or Rest)    Narrative  [address]  [address] [place] [address]  [phone]    [facility]    Patient: Wendy Howard  MR number: [id number]  Account number: [de-identified]  : 1955  Age: 60 years  Gender: Male  Status: Outpatient  Location: Stress lab  Height: 73 in  Weight: 286 lb  BP: 131/ 74 mmHg    Allergies: NO KNOWN ALLERGIES    Primary Physician:  Suresh Cardona MD  RN:  Domingo Potter RN  Interpreting Physician:  Samuel Jacobs MD    INDICATIONS: Coronary artery disease  HISTORY: The patient is a 60year old  male  Chest pain status: no chest pain  Coronary artery disease risk factors: dyslipidemia and family history of premature coronary artery disease   Small, acute cortical infarct in right MCA  territory, chronic Afib Medications: a beta blocker, clopidogrel, and a lipid lowering agent  REST ECG: Atrial fibrillation  PROCEDURE: The study was performed in the the Stress lab  A regadenoson infusion pharmacologic stress test was performed  Systolic blood pressure was 131 mmHg, at the start of the study  Diastolic blood pressure was 74 mmHg, at the start  of the study  The heart rate was 75 bpm, at the start of the study  IV double checked  Regadenoson protocol:  Time HR bpm SBP mmHg DBP mmHg Symptoms Rhythm/conduct  Baseline 10:18 75 131 74 none A-fib  Immediate 13:20 -- -- -- -- --  1 min 10:19 101 128 70 mild dyspnea, flushing same as above, occasional PVC's  2 min 10:20 130 135 65 subsiding A-fib, occasional PVC's  3 min 10:21 109 121 60 subsiding A-fib, occasional PVC's  4 min 10:22 103 121 70 subsiding A-fib, occasional PVC's  5 min 10:23 101 119 66 subsiding same as above    STRESS SUMMARY: Duration of pharmacologic stress was 3 min and 0 sec  Maximal heart rate during stress was 131 bpm ( 83 % of maximal predicted heart rate)  The rate-pressure product for the peak heart rate and blood pressure was 49768  There was no chest pain during stress  The stress test was terminated due to protocol completion  The stress ECG was equivocal for ischemia  There were no stress arrhythmias or conduction abnormalities  Patient has chronic atrial  fibrillation  ISOTOPE ADMINISTRATION:  Resting isotope administration Stress isotope administration  Agent Tetrofosmin Tetrofosmin  Dose 11 mCi 32 8 mCi  Injection time 08:25 10:19    The radiopharmaceutical was injected at the peak effect of pharmacologic stress  MYOCARDIAL PERFUSION IMAGING:  The image quality was fair  Rotating projection images reveal mild diaphragmatic attenuation  Left ventricular size was normal  The TID ratio was 1  18  The right ventricle was dilated      PERFUSION DEFECTS:  -  There was a small, mildly severe, fixed myocardial perfusion defect of the apical wall likely  As there appeared normalized on prone images    GATED SPECT:  The calculated left ventricular ejection fraction was 52 %  Left ventricular ejection fraction was within normal limits by visual estimate  There was no left ventricular regional abnormality  SUMMARY:  -  Stress results: Target heart rate was not achieved  There was no chest pain during stress  -  ECG conclusions: The stress ECG was equivocal for ischemia  -  Perfusion imaging: The right ventricle was dilated  There was a small, mildly severe, fixed myocardial perfusion defect of the apical wall likely  As there appeared normalized on prone images  -  Gated SPECT: The calculated left ventricular ejection fraction was 52 %  Left ventricular ejection fraction was within normal limits by visual estimate  There was no left ventricular regional abnormality  IMPRESSIONS: Probably normal study after pharmacologic vasodilation without reproduction of symptoms  EF is 50-55% Myocardial perfusion imaging was normal at rest and with stress  Left ventricular systolic function was normal  Diagnostic  sensitivity was limited by submaximal stress      Prepared and signed by    Tapan Valverde MD  Signed 01/07/2021 16:03:47      Lab Review   Lab Results   Component Value Date    WBC 6 99 06/09/2021    HGB 15 5 06/09/2021    HCT 46 5 06/09/2021    MCV 86 06/09/2021    RDW 12 8 06/09/2021     06/09/2021     BMP:  Lab Results   Component Value Date    SODIUM 142 06/09/2021    K 3 9 06/09/2021     06/09/2021    CO2 27 06/09/2021    ANIONGAP 13 0 12/26/2015    BUN 15 06/09/2021    CREATININE 0 74 06/09/2021    GLUC 113 06/09/2021    CALCIUM 9 0 06/09/2021    CORRECTEDCA 8 9 01/05/2021    EGFR 97 06/09/2021    MG 2 1 01/06/2021     Troponins:    LFT:  Lab Results   Component Value Date    AST 17 07/16/2021    ALT 24 07/16/2021    ALKPHOS 63 01/06/2021    TP 6 7 07/16/2021    ALB 4 1 07/16/2021        Lab Results   Component Value Date    HGBA1C 5 4 01/06/2021     Lipid Profile:   Lab Results   Component Value Date    CHOLESTEROL 106 07/16/2021    HDL 35 (L) 07/16/2021    LDLCALC 54 07/16/2021    TRIG 85 07/16/2021     Lab Results   Component Value Date    CHOLESTEROL 106 07/16/2021    CHOLESTEROL 186 01/06/2021     Lab Results   Component Value Date    TROPONINI <0 02 01/05/2021             Dr Pepper Gamboa MD [facility]      "This note has been constructed using a voice recognition system  Therefore there may be syntax, spelling, and/or grammatical errors   Please call if you have any questions  "

## 2022-07-18 ENCOUNTER — TELEPHONE (OUTPATIENT)
Dept: CARDIOLOGY CLINIC | Facility: CLINIC | Age: 67
End: 2022-07-18

## 2022-07-18 NOTE — TELEPHONE ENCOUNTER
Patient called to seek information regarding what he should be doing with his metoprolol dosage? Aware you are unavailable

## 2022-07-19 LAB
ALBUMIN SERPL-MCNC: 3.9 G/DL (ref 3.8–4.8)
ALBUMIN/GLOB SERPL: 1.3 {RATIO} (ref 1.2–2.2)
ALP SERPL-CCNC: 83 IU/L (ref 44–121)
ALT SERPL-CCNC: 23 IU/L (ref 0–44)
AST SERPL-CCNC: 23 IU/L (ref 0–40)
BASOPHILS # BLD AUTO: 0 X10E3/UL (ref 0–0.2)
BASOPHILS NFR BLD AUTO: 1 %
BILIRUB SERPL-MCNC: 0.5 MG/DL (ref 0–1.2)
BUN SERPL-MCNC: 13 MG/DL (ref 8–27)
BUN/CREAT SERPL: 19 (ref 10–24)
CALCIUM SERPL-MCNC: 8.9 MG/DL (ref 8.6–10.2)
CHLORIDE SERPL-SCNC: 104 MMOL/L (ref 96–106)
CHOLEST SERPL-MCNC: 88 MG/DL (ref 100–199)
CO2 SERPL-SCNC: 25 MMOL/L (ref 20–29)
CREAT SERPL-MCNC: 0.67 MG/DL (ref 0.76–1.27)
EGFR: 104 ML/MIN/1.73
EOSINOPHIL # BLD AUTO: 0.2 X10E3/UL (ref 0–0.4)
EOSINOPHIL NFR BLD AUTO: 3 %
ERYTHROCYTE [DISTWIDTH] IN BLOOD BY AUTOMATED COUNT: 13.3 % (ref 11.6–15.4)
GLOBULIN SER-MCNC: 2.9 G/DL (ref 1.5–4.5)
GLUCOSE SERPL-MCNC: 102 MG/DL (ref 65–99)
HCT VFR BLD AUTO: 45.1 % (ref 37.5–51)
HDLC SERPL-MCNC: 32 MG/DL
HGB BLD-MCNC: 14.7 G/DL (ref 13–17.7)
IMM GRANULOCYTES # BLD: 0 X10E3/UL (ref 0–0.1)
IMM GRANULOCYTES NFR BLD: 0 %
LDLC SERPL CALC-MCNC: 41 MG/DL (ref 0–99)
LYMPHOCYTES # BLD AUTO: 2.3 X10E3/UL (ref 0.7–3.1)
LYMPHOCYTES NFR BLD AUTO: 35 %
MCH RBC QN AUTO: 28.2 PG (ref 26.6–33)
MCHC RBC AUTO-ENTMCNC: 32.6 G/DL (ref 31.5–35.7)
MCV RBC AUTO: 87 FL (ref 79–97)
MONOCYTES # BLD AUTO: 0.5 X10E3/UL (ref 0.1–0.9)
MONOCYTES NFR BLD AUTO: 8 %
NEUTROPHILS # BLD AUTO: 3.5 X10E3/UL (ref 1.4–7)
NEUTROPHILS NFR BLD AUTO: 53 %
PLATELET # BLD AUTO: 187 X10E3/UL (ref 150–450)
POTASSIUM SERPL-SCNC: 4.3 MMOL/L (ref 3.5–5.2)
PROT SERPL-MCNC: 6.8 G/DL (ref 6–8.5)
RBC # BLD AUTO: 5.21 X10E6/UL (ref 4.14–5.8)
SL AMB VLDL CHOLESTEROL CALC: 15 MG/DL (ref 5–40)
SODIUM SERPL-SCNC: 142 MMOL/L (ref 134–144)
TRIGL SERPL-MCNC: 69 MG/DL (ref 0–149)
TSH SERPL DL<=0.005 MIU/L-ACNC: 1.57 UIU/ML (ref 0.45–4.5)
WBC # BLD AUTO: 6.6 X10E3/UL (ref 3.4–10.8)

## 2022-07-20 ENCOUNTER — TELEPHONE (OUTPATIENT)
Dept: CARDIOLOGY CLINIC | Facility: CLINIC | Age: 67
End: 2022-07-20

## 2022-07-20 DIAGNOSIS — I63.9 ACUTE ISCHEMIC STROKE (HCC): ICD-10-CM

## 2022-07-20 RX ORDER — ATORVASTATIN CALCIUM 40 MG/1
40 TABLET, FILM COATED ORAL
Qty: 90 TABLET | Refills: 3 | Status: SHIPPED | OUTPATIENT
Start: 2022-07-20

## 2022-07-20 NOTE — TELEPHONE ENCOUNTER
----- Message from Jackie Pearson MD sent at 7/20/2022  7:46 AM EDT -----  Patient's labs reviewed  Cholesterol is only 88  There is no need to lower cholesterol diet low  We can cut back Lipitor to 40 mg   Continue other medication as before other labs are acceptable

## 2022-09-07 DIAGNOSIS — I63.9 ACUTE ISCHEMIC STROKE (HCC): ICD-10-CM

## 2022-09-07 RX ORDER — ATORVASTATIN CALCIUM 80 MG/1
TABLET, FILM COATED ORAL
Qty: 90 TABLET | Refills: 3 | Status: SHIPPED | OUTPATIENT
Start: 2022-09-07

## 2022-09-24 DIAGNOSIS — G43.109 MIGRAINE WITH AURA AND WITHOUT STATUS MIGRAINOSUS, NOT INTRACTABLE: ICD-10-CM

## 2022-09-26 RX ORDER — VERAPAMIL HYDROCHLORIDE 120 MG/1
120 CAPSULE, EXTENDED RELEASE ORAL
Qty: 90 CAPSULE | Refills: 1 | Status: SHIPPED | OUTPATIENT
Start: 2022-09-26

## 2022-10-21 DIAGNOSIS — I48.91 ATRIAL FIBRILLATION, UNSPECIFIED TYPE (HCC): ICD-10-CM

## 2022-10-21 RX ORDER — APIXABAN 5 MG/1
TABLET, FILM COATED ORAL
Qty: 180 TABLET | Refills: 3 | Status: SHIPPED | OUTPATIENT
Start: 2022-10-21

## 2022-10-25 DIAGNOSIS — I48.91 ATRIAL FIBRILLATION, UNSPECIFIED TYPE (HCC): ICD-10-CM

## 2022-10-25 DIAGNOSIS — I10 ESSENTIAL HYPERTENSION: ICD-10-CM

## 2023-01-17 ENCOUNTER — TELEPHONE (OUTPATIENT)
Dept: CARDIOLOGY CLINIC | Facility: CLINIC | Age: 68
End: 2023-01-17

## 2023-01-27 ENCOUNTER — OFFICE VISIT (OUTPATIENT)
Dept: CARDIOLOGY CLINIC | Facility: CLINIC | Age: 68
End: 2023-01-27

## 2023-01-27 VITALS
BODY MASS INDEX: 33.46 KG/M2 | OXYGEN SATURATION: 97 % | SYSTOLIC BLOOD PRESSURE: 120 MMHG | DIASTOLIC BLOOD PRESSURE: 80 MMHG | HEART RATE: 58 BPM | HEIGHT: 72 IN | TEMPERATURE: 97 F | WEIGHT: 247 LBS

## 2023-01-27 DIAGNOSIS — I11.9 HYPERTENSIVE HEART DISEASE WITHOUT HEART FAILURE: ICD-10-CM

## 2023-01-27 DIAGNOSIS — I71.21 ANEURYSM OF ASCENDING AORTA WITHOUT RUPTURE: ICD-10-CM

## 2023-01-27 DIAGNOSIS — Z86.73 HISTORY OF STROKE: ICD-10-CM

## 2023-01-27 DIAGNOSIS — E78.49 HYPERLIPIDEMIA, FAMILIAL, HIGH LDL: ICD-10-CM

## 2023-01-27 DIAGNOSIS — I35.0 AORTIC STENOSIS, MODERATE: ICD-10-CM

## 2023-01-27 DIAGNOSIS — I48.21 PERMANENT ATRIAL FIBRILLATION (HCC): ICD-10-CM

## 2023-01-27 DIAGNOSIS — E66.9 OBESITY (BMI 30-39.9): ICD-10-CM

## 2023-01-27 NOTE — PROGRESS NOTES
Progress Note - Cardiology Office  [facility]    Dinah Galarza 78 y o  male MRN: [id number]  : 1955  Encounter: [id number]      Assessment:     1  Hypertensive heart disease without heart failure    2  Permanent atrial fibrillation ([address] )    3  Aortic stenosis, moderate    4  History of stroke    5  Hyperlipidemia, familial, high LDL    6  Obesity (BMI 30-39 9)    7  Aneurysm of ascending aorta without rupture        Discussion Summary and Plan:    67-year-old male with history of probably chronic atrial fibrillation as last EKG in  also showing atrial fibrillation, obesity with BMI around 34, history of small ascending aortic aneurysm by CT scan, essential hypertension, cardiac murmur now diagnosed to have moderate aortic stenosis was seen after stroke        1  History of CVA  Patient had history of stroke last year  Fortunately he has recovered most of his function  He is now on antithrombotic therapy  His EKG is consistent with chronic atrial fibrillation  Continue Eliquis           2  Permanent atrial fibrillation  Patient has atrial fibrillation since   He was not taking blood thinners before the stroke he was on aspirin  He has been compliant with Eliquis since his stroke and is doing well  He would be given samples for 1 month       3  Cardiac murmur   Repeat echo Doppler in May 2022 shows he has moderate aortic stenosis mild-to-moderate AI  LV function is normal will continue to monitor  He has no new symptoms  Continue to monitor      4  Essential hypertension   Blood pressure has been acceptable with atenolol and verapamil  Will check electrolytes  Ashley Andrews Heart rate blood pressure acceptable      5  Obesity with BMI around 34 he has lost some weight      6  Small ascending aortic aneurysm of 4 1 cm by CT scan  We will check a chest to measure aneurysm       7  Dyslipidemia  Continue statin    His cholesterol was very low he is currently on Lipitor 40 mg       Although seizure discussed with patient  Patient and patient's wife was advised and educated to call our office  immediately if  patient has any new symptoms of chest pain/shortness of breath, near-syncope, syncope, light headedness sustained palpitations  or any other cardiovascular symptoms before their scheduled follow-up appointment  Office #[phone]  Please call [phone] if any questions  Counseling :  A description of the counseling  Goals and Barriers  Patient's ability to self care: Yes  Medication side effect reviewed with patient in detail and all their questions answered to their satisfaction  HPI :     Jamaal Joshi is a 78y o  year old male who came for follow up  Patient has been seen in [facility] in the past has medical history significant for probably permanent atrial fibrillation, obesity with BMI around 34, history of small ascending aortic aneurysm by CT scan, essential hypertension, cardiac murmur with moderate aortic stenosis who came for follow-up  Patient was seen by us in January 2021 when he had a stroke  He was noted to have M2/M3 branch occlusion at that time  Nausea no vomiting no fever no chills  He has quit drinking also he has been very active  He came with his wife no issues EKG shows he is in atrial fibrillation heart rate 76 beats per minute  No other cardiovascular problem  His mother had heart problem and older age  No recent surgeries  1/27/2023  Have reviewed  Patient came for follow-up  His medical history significant for monitor fibrillation, obesity with a BMI of 34, small ascending aortic aneurysm by CAT scan, essential hypertension, cardiac murmur with moderate aortic stenosis who came for follow-up  In January 2021 he had a stroke  He was noted to have M2/M3 branch occlusion  He is now on Eliquis  He is quit smoking  No nausea no vomiting no fever no chills no other issues    His last echo was in May 2022  No nausea no vomiting no fever no chills no PND no orthopnea no other cardiovascular issues EKG shows no change from previous EKG  His vitals are stable  His blood test done in July 2022 are acceptable cholesterol was 88    Review of Systems   Constitutional: Negative for activity change, chills, diaphoresis, fever and unexpected weight change  HENT: Negative for congestion  Eyes: Negative for discharge and redness  Respiratory: Negative for cough, chest tightness, shortness of breath and wheezing  Cardiovascular: Negative  Negative for chest pain, palpitations and leg swelling  Gastrointestinal: Negative for abdominal pain, diarrhea and nausea  Endocrine: Negative  Genitourinary: Negative for decreased urine volume and urgency  Musculoskeletal: Negative  Negative for arthralgias, back pain and gait problem  Skin: Negative for rash and wound  Allergic/Immunologic: Negative  Neurological: Negative for dizziness, seizures, syncope, weakness, light-headedness and headaches  Hematological: Negative  Psychiatric/Behavioral: Negative for agitation and confusion  The patient is not nervous/anxious          Historical Information   Past Medical History:   Diagnosis Date   • Atrial fibrillation ([facility] )    • Hypertension    • Paroxysmal atrial fibrillation (HCC)    • Stroke ([facility] )    • Valvular heart disease      Past Surgical History:   Procedure Laterality Date   • HERNIA REPAIR     • KNEE SURGERY       Social History     Substance and Sexual Activity   Alcohol Use Not Currently   • Alcohol/week: 6 0 standard drinks   • Types: 6 Cans of beer per week     Social History     Substance and Sexual Activity   Drug Use No     Social History     Tobacco Use   Smoking Status Never   Smokeless Tobacco Never     Family History:   Family History   Problem Relation Age of Onset   • No Known Problems Mother    • Stroke Father    • Heart failure Brother         heart problems   • Brain cancer Brother          0       Meds/Allergies     No Known Allergies    Current Outpatient Medications:   •  aspirin 81 mg chewable tablet, Chew 1 tablet (81 mg total) daily, Disp: 30 tablet, Rfl: 0  •  atorvastatin (LIPITOR) 40 mg tablet, Take 1 tablet (40 mg total) by mouth daily with dinner, Disp: 90 tablet, Rfl: 3  •  Eliquis 5 MG, TAKE 1 TABLET BY MOUTH TWICE A DAY, Disp: 180 tablet, Rfl: 3  •  metoprolol tartrate (LOPRESSOR) 25 mg tablet, Take 1 tablet (25 mg total) by mouth every 12 (twelve) hours, Disp: 180 tablet, Rfl: 3  •  verapamil (VERELAN PM) 120 MG 24 hr capsule, TAKE 1 CAPSULE (120 MG TOTAL) BY MOUTH DAILY AT BEDTIME, Disp: 90 capsule, Rfl: 1    Vitals: Blood pressure 120/80, pulse 58, temperature (!) 97 °F (36 1 °C), height 6' (1 829 m), weight 112 kg (247 lb), SpO2 97 %  ?  Body mass index is 33 5 kg/m²  Wt Readings from Last 3 Encounters:   23 112 kg (247 lb)   22 115 kg (253 lb)   22 115 kg (253 lb)     Vitals:    23 1546   Weight: 112 kg (247 lb)     BP Readings from Last 3 Encounters:   23 120/80   22 110/70   22 132/81       Physical Exam:    Physical Exam    Neurologic:  Alert & oriented x 3, no new focal deficits, Not in any acute distress,  Constitutional:    Adequate built,  non-toxic appearance   Neck: No tenderness,  Neck supple, No JVP,   Respiratory:  Bilateral air entry, mostly clear to auscultation  Cardiovascular: S1-S2 regular with a 2/6 ejection systolic murmur and S4 is present  GI:  Soft, nondistended, no hepatosplenomegaly appreciated  Musculoskeletal:  No edema, no tenderness, no deformities  Skin:  Well hydrated, no rash   Extremities: No edema    He has a leg wound which is now healing  Psychiatric:  Speech and behavior appropriate     Diagnostic Studies Review Cardio:    Echo Doppler done in May 2022 shows patient has normal LV systolic function EF 17%, mild AI moderate aortic stenosis, aortic valve area was thought to be 1 1 cm2 mean gradient 20 mmHg  There was mild-to-moderate AI  Small ascending aortic aneurysm 4 cm noted    Nuclear stress test   Nuclear stress test done in 2021 shows EF 50 55%  Micro perfusion was normal     Holter monitor in May 2022, shows rhythm is atrial fibrillation throughout the studies  Few PACs and PVCs noted average heart rate 68 beats per minute  EKG:  Twelve lead EKG 2022 shows atrial fibrillation heart rate 71 beats per minute  No other significant abnormality  Twelve-lead EKG 2023 shows atrial fibrillation heart rate 58 bpm   No significant change from previous EKG  Cardiac testing:   Results for orders placed during the hospital encounter of 21    Echo complete with contrast if indicated    Narrative  [address]  [address] [facility]  [phone]    Transthoracic Echocardiogram  2D, M-mode, Doppler, and Color Doppler    Study date:  2021    Patient: Hector Gary  MR number: [id number]  Account number: [de-identified]  : 1955  Age: 60 years  Gender: Male  Status: Outpatient  Location: Bedside  Height: 71 in  Weight: 285 3 lb  BP: 139/ 93 mmHg    Indications: TIA    Diagnoses: G45 9 - Transient cerebral ischemic attack, unspecified    Sonographer:  KELVIN Shearer  Primary Physician:  Elli Reyes MD  Referring Physician:  HAILEE Mcpherson  Group:  [facility]  Interpreting Physician:  Mayela Ortega MD    SUMMARY    PROCEDURE INFORMATION:  This was a technically difficult study  LEFT VENTRICLE:  Systolic function was normal  Ejection fraction was estimated in the range of 60 % to 65 % to be 65 %  Although no diagnostic regional wall motion abnormality was identified, this possibility cannot be completely excluded on the basis of this study  Wall thickness was moderately increased  There was moderate concentric hypertrophy      LEFT ATRIUM:  The atrium was mildly to moderately dilated  ATRIAL SEPTUM:  No good quality bubble study was recorded    RIGHT ATRIUM:  The atrium was mildly to moderately dilated  MITRAL VALVE:  There was mild to moderate annular calcification  There was mild regurgitation  AORTIC VALVE:  The valve was trileaflet  Leaflets exhibited moderately increased thickness, moderate calcification, and moderately reduced cuspal separation  Transaortic velocity was increased due to valvular stenosis  There was moderate stenosis  ROCÍO 1 1 cm2, mean gradient 20 mm of hg and peak around 40 mm of hg  There was mild to moderate regurgitation  TRICUSPID VALVE:  There was trace regurgitation  The tricuspid jet envelope definition was inadequate for estimation of RV systolic pressure  AORTA:  The root exhibited upper normal normal size  Size around 4 cm    HISTORY: PRIOR HISTORY: TIA,A Fib ,HTN,Valvular heart disease  PROCEDURE: The procedure was performed at the bedside  This was a routine study  The transthoracic approach was used  The study included complete 2D imaging, M-mode, complete spectral Doppler, and color Doppler  The heart rate was 107 bpm,  at the start of the study  Images were obtained from the parasternal, apical, subcostal, and suprasternal notch acoustic windows  Intravenous contrast (agitated saline) was administered to evaluate possible R-L intracardiac shunting  This  was a technically difficult study  LEFT VENTRICLE: Size was normal  Systolic function was normal  Ejection fraction was estimated in the range of 60 % to 65 % to be 65 %  Although no diagnostic regional wall motion abnormality was identified, this possibility cannot be  completely excluded on the basis of this study  Wall thickness was moderately increased  There was moderate concentric hypertrophy  DOPPLER: The study was not technically sufficient to allow evaluation of LV diastolic function, due to  atrial fibrillation      RIGHT VENTRICLE: The size was normal  Systolic function was normal     LEFT ATRIUM: The atrium was mildly to moderately dilated  ATRIAL SEPTUM: No good quality bubble study was recorded    RIGHT ATRIUM: The atrium was mildly to moderately dilated  MITRAL VALVE: There was mild to moderate annular calcification  There was mild calcification  There was normal leaflet separation  DOPPLER: The transmitral velocity was within the normal range  There was no evidence for stenosis  There was  mild regurgitation  AORTIC VALVE: The valve was trileaflet  Leaflets exhibited moderately increased thickness, moderate calcification, and moderately reduced cuspal separation  DOPPLER: Transaortic velocity was increased due to valvular stenosis  There was  moderate stenosis  ROCÍO 1 1 cm2, mean gradient 20 mm of hg and peak around 40 mm of hg  There was mild to moderate regurgitation  TRICUSPID VALVE: DOPPLER: There was trace regurgitation  The tricuspid jet envelope definition was inadequate for estimation of RV systolic pressure  PULMONIC VALVE: DOPPLER: There was no significant regurgitation  PERICARDIUM: There was no thickening or calcification  There was no pericardial effusion  AORTA: The root exhibited upper normal normal size  Size around 4 cm    SYSTEMIC VEINS: IVC: The inferior vena cava was not well visualized  SYSTEM MEASUREMENT TABLES    2D  EF (Teich): 54 08 %  %FS: 27 98 %  Ao Diam: 3 84 cm  EDV(Teich): 106 84 ml  ESV(Teich): 49 06 ml  IVSd: 1 36 cm  LA Area: 21 24 cm2  LA Diam: 4 39 cm  LVIDd: 4 79 cm  LVIDs: 3 45 cm  LVOT Diam: 2 18 cm  LVPWd: 1 32 cm  RA Area: 27 21 cm2  RVIDd: 4 12 cm  SV (Teich): 57 78 ml    PW  E' Lat: 0 08 m/s  E' Sept: 0 13 m/s  LVOT Env  Ti: 308 28 ms  LVOT VTI: 17 64 cm  LVOT Vmax: 0 89 m/s  LVOT Vmean: 0 57 m/s  LVOT maxPG: 3 19 mmHg  LVOT meanP 51 mmHg    [facility] Accredited Echocardiography Laboratory    Prepared and electronically signed by    Randy Serrano MD  Signed 2021 14:54:03      Results for orders placed during the hospital encounter of 21    NM Myocardial Perfusion Spect (Exercise Induced Stress and/or Rest)    Narrative  [facility] [place] [address], [place] [address]  [phone]    Regadenoson    Patient: Tierra Brenner  MR number: [id number]  Account number: [de-identified]  : 1955  Age: 60 years  Gender: Male  Status: Outpatient  Location: Stress lab  Height: 73 in  Weight: 286 lb  BP: 131/ 74 mmHg    Allergies: NO KNOWN ALLERGIES    Primary Physician:  Juan Amaral MD  RN:  Sahil Bowden RN  Interpreting Physician:  Karmen Rodgers MD    INDICATIONS: Coronary artery disease  HISTORY: The patient is a 60year old  male  Chest pain status: no chest pain  Coronary artery disease risk factors: dyslipidemia and family history of premature coronary artery disease  Small, acute cortical infarct in right MCA  territory, chronic Afib Medications: a beta blocker, clopidogrel, and a lipid lowering agent  REST ECG: Atrial fibrillation  PROCEDURE: The study was performed in the the Stress lab  A regadenoson infusion pharmacologic stress test was performed  Systolic blood pressure was 131 mmHg, at the start of the study  Diastolic blood pressure was 74 mmHg, at the start  of the study  The heart rate was 75 bpm, at the start of the study  IV double checked  Regadenoson protocol:  Time HR bpm SBP mmHg DBP mmHg Symptoms Rhythm/conduct  Baseline 10:18 75 131 74 none A-fib  Immediate 13:20 -- -- -- -- --  1 min 10:19 101 128 70 mild dyspnea, flushing same as above, occasional PVC's  2 min 10:20 130 135 65 subsiding A-fib, occasional PVC's  3 min 10:21 109 121 60 subsiding A-fib, occasional PVC's  4 min 10:22 103 121 70 subsiding A-fib, occasional PVC's  5 min 10:23 101 119 66 subsiding same as above    STRESS SUMMARY: Duration of pharmacologic stress was 3 min and 0 sec   Maximal heart rate during stress was 131 bpm ( 83 % of maximal predicted heart rate)  The rate-pressure product for the peak heart rate and blood pressure was 96271  There was no chest pain during stress  The stress test was terminated due to protocol completion  The stress ECG was equivocal for ischemia  There were no stress arrhythmias or conduction abnormalities  Patient has chronic atrial  fibrillation  ISOTOPE ADMINISTRATION:  Resting isotope administration Stress isotope administration  Agent Tetrofosmin Tetrofosmin  Dose 11 mCi 32 8 mCi  Injection time 08:25 10:19    The radiopharmaceutical was injected at the peak effect of pharmacologic stress  MYOCARDIAL PERFUSION IMAGING:  The image quality was fair  Rotating projection images reveal mild diaphragmatic attenuation  Left ventricular size was normal  The TID ratio was 1  18  The right ventricle was dilated  PERFUSION DEFECTS:  -  There was a small, mildly severe, fixed myocardial perfusion defect of the apical wall likely  As there appeared normalized on prone images    GATED SPECT:  The calculated left ventricular ejection fraction was 52 %  Left ventricular ejection fraction was within normal limits by visual estimate  There was no left ventricular regional abnormality  SUMMARY:  -  Stress results: Target heart rate was not achieved  There was no chest pain during stress  -  ECG conclusions: The stress ECG was equivocal for ischemia  -  Perfusion imaging: The right ventricle was dilated  There was a small, mildly severe, fixed myocardial perfusion defect of the apical wall likely  As there appeared normalized on prone images  -  Gated SPECT: The calculated left ventricular ejection fraction was 52 %  Left ventricular ejection fraction was within normal limits by visual estimate  There was no left ventricular regional abnormality  IMPRESSIONS: Probably normal study after pharmacologic vasodilation without reproduction of symptoms   EF is 50-55% Myocardial perfusion imaging was normal at rest and with stress  Left ventricular systolic function was normal  Diagnostic  sensitivity was limited by submaximal stress  Prepared and signed by    Freddy Palacios MD  Signed 01/07/2021 16:03:47      Lab Review   Lab Results   Component Value Date    WBC 6 6 07/18/2022    HGB 14 7 07/18/2022    HCT 45 1 07/18/2022    MCV 87 07/18/2022    RDW 13 3 07/18/2022     07/18/2022     BMP:  Lab Results   Component Value Date    SODIUM 142 07/18/2022    K 4 3 07/18/2022     07/18/2022    CO2 25 07/18/2022    ANIONGAP 13 0 12/26/2015    BUN 13 07/18/2022    CREATININE 0 67 (L) 07/18/2022    GLUC 102 (H) 07/18/2022    CALCIUM 9 0 06/09/2021    CORRECTEDCA 8 9 01/05/2021    EGFR 104 07/18/2022    MG 2 1 01/06/2021     Troponins:    LFT:  Lab Results   Component Value Date    AST 23 07/18/2022    ALT 23 07/18/2022    ALKPHOS 63 01/06/2021    TP 6 8 07/18/2022    ALB 3 9 07/18/2022        Lab Results   Component Value Date    HGBA1C 5 4 01/06/2021     Lipid Profile:   Lab Results   Component Value Date    CHOLESTEROL 88 (L) 07/18/2022    HDL 32 (L) 07/18/2022    LDLCALC 41 07/18/2022    TRIG 69 07/18/2022     Lab Results   Component Value Date    CHOLESTEROL 88 (L) 07/18/2022    CHOLESTEROL 106 07/16/2021     Lab Results   Component Value Date    TROPONINI <0 02 01/05/2021             Dr Freddy Palacios MD [facility]      "This note has been constructed using a voice recognition system  Therefore there may be syntax, spelling, and/or grammatical errors   Please call if you have any questions  "

## 2023-01-31 DIAGNOSIS — I48.91 ATRIAL FIBRILLATION, UNSPECIFIED TYPE (HCC): ICD-10-CM

## 2023-03-07 ENCOUNTER — TELEPHONE (OUTPATIENT)
Dept: CARDIOLOGY CLINIC | Facility: CLINIC | Age: 68
End: 2023-03-07

## 2023-03-21 ENCOUNTER — TELEPHONE (OUTPATIENT)
Dept: CARDIOLOGY CLINIC | Facility: CLINIC | Age: 68
End: 2023-03-21

## 2023-03-21 NOTE — TELEPHONE ENCOUNTER
This seems to be more like a problem for his medical doctor  His blood pressure is acceptable  He should contact his medical doctor

## 2023-03-21 NOTE — TELEPHONE ENCOUNTER
/76 does not take pulse  Concerns with ongoing pounding headaches on right side  He states that he feels like his sinuses are plugged  Denies changes in vision, cp, dizziness

## 2023-03-24 ENCOUNTER — HOSPITAL ENCOUNTER (OUTPATIENT)
Dept: RADIOLOGY | Facility: HOSPITAL | Age: 68
Discharge: HOME | End: 2023-03-24

## 2023-03-24 DIAGNOSIS — R51.9 NONINTRACTABLE HEADACHE, UNSPECIFIED CHRONICITY PATTERN, UNSPECIFIED HEADACHE TYPE: ICD-10-CM

## 2023-03-25 DIAGNOSIS — G43.109 MIGRAINE WITH AURA AND WITHOUT STATUS MIGRAINOSUS, NOT INTRACTABLE: ICD-10-CM

## 2023-03-27 RX ORDER — VERAPAMIL HYDROCHLORIDE 120 MG/1
120 CAPSULE, EXTENDED RELEASE ORAL
Qty: 90 CAPSULE | Refills: 1 | Status: SHIPPED | OUTPATIENT
Start: 2023-03-27

## 2023-05-15 ENCOUNTER — TELEPHONE (OUTPATIENT)
Dept: CARDIOLOGY CLINIC | Facility: CLINIC | Age: 68
End: 2023-05-15

## 2023-05-15 DIAGNOSIS — I48.21 PERMANENT ATRIAL FIBRILLATION (HCC): Primary | ICD-10-CM

## 2023-05-15 NOTE — TELEPHONE ENCOUNTER
Pt req samples due to financial situation      ELIQUIS 5 MG  3 BOXES OF 14 EACH = 42 TOTAL  LOT#  [id number]  EXP MAR 2025

## 2023-05-25 ENCOUNTER — TELEPHONE (OUTPATIENT)
Dept: CARDIOLOGY CLINIC | Facility: CLINIC | Age: 68
End: 2023-05-25

## 2023-05-25 NOTE — TELEPHONE ENCOUNTER
Dr Adams Kiran pt-  States he is having trouble affording the Eliquis and wants to know if there are any patient assistance programs he would qualify for  Please reach out to him

## 2023-05-25 NOTE — TELEPHONE ENCOUNTER
Discussed checking with insurance for cheaper alternative  Patient states that he has been on warfarin and is comfortable on eliquis  I explained the qualification formulas and such for [employer]  He is willing to apply  I will hold on to provider portion and mail the rest  Patient is aware, and will await form to complete his portion  Patient portion is in the mail

## 2023-06-19 ENCOUNTER — TELEPHONE (OUTPATIENT)
Dept: CARDIOLOGY CLINIC | Facility: CLINIC | Age: 68
End: 2023-06-19

## 2023-06-19 DIAGNOSIS — I48.21 PERMANENT ATRIAL FIBRILLATION (HCC): Primary | ICD-10-CM

## 2023-06-19 NOTE — TELEPHONE ENCOUNTER
THE FOLLOWING SAMPLES WERE GIVEN TO THE PATIENT     ELIQUIS 5MG   3 BX  LOT# [id number]  EXP APR 2025    2BX  LOT# [id number]  EXP JUL 2023(PT AUDREY MADE AWEAR TO USE THESE BOX'S FIRST

## 2023-06-26 ENCOUNTER — HOSPITAL ENCOUNTER (OUTPATIENT)
Dept: RADIOLOGY | Facility: HOSPITAL | Age: 68
Discharge: HOME | End: 2023-06-26
Attending: INTERNAL MEDICINE
Payer: MEDICARE

## 2023-06-26 DIAGNOSIS — I63.9 ACUTE ISCHEMIC STROKE (HCC): ICD-10-CM

## 2023-06-26 DIAGNOSIS — I71.21 ANEURYSM OF ASCENDING AORTA WITHOUT RUPTURE (HCC): ICD-10-CM

## 2023-06-26 PROCEDURE — 71250 CT THORAX DX C-: CPT

## 2023-06-26 PROCEDURE — G1004 CDSM NDSC: HCPCS

## 2023-06-26 RX ORDER — ATORVASTATIN CALCIUM 40 MG/1
TABLET, FILM COATED ORAL
Qty: 90 TABLET | Refills: 3 | Status: SHIPPED | OUTPATIENT
Start: 2023-06-26

## 2023-07-03 ENCOUNTER — TELEPHONE (OUTPATIENT)
Dept: CARDIOLOGY CLINIC | Facility: CLINIC | Age: 68
End: 2023-07-03

## 2023-07-03 NOTE — TELEPHONE ENCOUNTER
----- Message from Maria Eugenia Zimmer MD sent at 7/3/2023  9:42 AM EDT -----  Patient's CAT scan shows 44 mm ectasia of ascending aorta. Patient has stable aneurysm and he is recommended to have repeat CAT scan in 1 year.

## 2023-07-28 ENCOUNTER — TELEPHONE (OUTPATIENT)
Dept: CARDIOLOGY CLINIC | Facility: CLINIC | Age: 68
End: 2023-07-28

## 2023-07-28 NOTE — TELEPHONE ENCOUNTER
Patient is asking if he can get samples of Eliquis to get him to his next appointment.  Please assist.

## 2023-07-28 NOTE — TELEPHONE ENCOUNTER
Yes we should always help this patient 1 to 2 months. Because we have to give it to a lot of people.

## 2023-08-04 ENCOUNTER — OFFICE VISIT (OUTPATIENT)
Dept: CARDIOLOGY CLINIC | Facility: CLINIC | Age: 68
End: 2023-08-04
Payer: MEDICARE

## 2023-08-04 VITALS
HEART RATE: 65 BPM | HEIGHT: 72 IN | DIASTOLIC BLOOD PRESSURE: 80 MMHG | SYSTOLIC BLOOD PRESSURE: 130 MMHG | BODY MASS INDEX: 32.91 KG/M2 | OXYGEN SATURATION: 98 % | WEIGHT: 243 LBS

## 2023-08-04 DIAGNOSIS — I11.9 HYPERTENSIVE HEART DISEASE WITHOUT HEART FAILURE: ICD-10-CM

## 2023-08-04 DIAGNOSIS — E78.49 HYPERLIPIDEMIA, FAMILIAL, HIGH LDL: ICD-10-CM

## 2023-08-04 DIAGNOSIS — I48.91 ATRIAL FIBRILLATION, UNSPECIFIED TYPE (HCC): ICD-10-CM

## 2023-08-04 DIAGNOSIS — I71.21 ANEURYSM OF ASCENDING AORTA WITHOUT RUPTURE (HCC): ICD-10-CM

## 2023-08-04 DIAGNOSIS — E66.9 OBESITY (BMI 30-39.9): ICD-10-CM

## 2023-08-04 DIAGNOSIS — I48.21 PERMANENT ATRIAL FIBRILLATION (HCC): ICD-10-CM

## 2023-08-04 DIAGNOSIS — Z86.73 HISTORY OF STROKE: ICD-10-CM

## 2023-08-04 DIAGNOSIS — I35.0 AORTIC STENOSIS, MODERATE: ICD-10-CM

## 2023-08-04 PROCEDURE — 93000 ELECTROCARDIOGRAM COMPLETE: CPT | Performed by: INTERNAL MEDICINE

## 2023-08-04 PROCEDURE — 99214 OFFICE O/P EST MOD 30 MIN: CPT | Performed by: INTERNAL MEDICINE

## 2023-08-04 RX ORDER — LORATADINE 10 MG/1
10 TABLET ORAL DAILY
COMMUNITY

## 2023-08-04 NOTE — PROGRESS NOTES
Progress Note - Cardiology Office  [facility]    Leonidas Mejia 78 y.o. male MRN: [id number]  : 1955  Encounter: [id number]      Assessment:     1. Permanent atrial fibrillation ([address])    2. Hypertensive heart disease without heart failure    3. Aortic stenosis, moderate    4. History of stroke    5. Hyperlipidemia, familial, high LDL    6. Obesity (BMI 30-39.9)    7. Aneurysm of ascending aorta without rupture (HCC)        Discussion Summary and Plan:    29-year-old male with history of probably chronic atrial fibrillation as last EKG in  also showing atrial fibrillation, obesity with BMI around 34, history of small ascending aortic aneurysm by CT scan, essential hypertension, cardiac murmur now diagnosed to have moderate aortic stenosis was seen after stroke.       1. History of CVA. Patient had history of stroke last year. Fortunately he has recovered most of his function. He is now on antithrombotic therapy. His EKG is consistent with chronic atrial fibrillation. Continue Eliquis. Samples provided          2. Permanent atrial fibrillation. Patient has atrial fibrillation since . He was not taking blood thinners before the stroke he was on aspirin. He has been compliant with Eliquis since his stroke and is doing well. We will give him some samples      3. Cardiac murmur.  Repeat echo Doppler in May 2022 shows he has moderate aortic stenosis mild-to-moderate AI. LV function is normal will continue to monitor. No change in symptoms. We will continue to monitor he denies any new issues.      4. Essential hypertension.  Blood pressure has been acceptable with atenolol and verapamil. Will check electrolytes. Tena Steiner Heart rate blood pressure acceptable labs reviewed      5. Obesity with BMI around 33 encouraged him to lose weight      6. Sending arctic aneurysm of 4.4 cm by CAT scan. Will need a repeat CAT scan in 1 year.      7. Dyslipidemia. Continue statin.   His cholesterol was very low he is currently on Lipitor 40 mg. Continue to monitor. If cholesterol remains low we may cut back on Lipitor further. Continue other Rx as before EKG reviewed. Some samples provided        Patient and patient's wife was advised and educated to call our office  immediately if  patient has any new symptoms of chest pain/shortness of breath, near-syncope, syncope, light headedness sustained palpitations  or any other cardiovascular symptoms before their scheduled follow-up appointment. Office #[phone]. Please call [phone] if any questions. Counseling :  A description of the counseling. Goals and Barriers. Patient's ability to self care: Yes  Medication side effect reviewed with patient in detail and all their questions answered to their satisfaction. HPI :     Magdi Winter is a 78y.o. year old male who came for follow up. Patient has been seen in [facility] practice in the past has medical history significant for probably permanent atrial fibrillation, obesity with BMI around 34, history of small ascending aortic aneurysm by CT scan, essential hypertension, cardiac murmur with moderate aortic stenosis who came for follow-up. Patient was seen by us in January 2021 when he had a stroke. He was noted to have M2/M3 branch occlusion at that time. Nausea no vomiting no fever no chills. He has quit drinking also he has been very active. He came with his wife no issues EKG shows he is in atrial fibrillation heart rate 76 beats per minute. No other cardiovascular problem. His mother had heart problem and older age. No recent surgeries. 1/27/2023. Have reviewed. Patient came for follow-up. His medical history significant for monitor fibrillation, obesity with a BMI of 34, small ascending aortic aneurysm by CAT scan, essential hypertension, cardiac murmur with moderate aortic stenosis who came for follow-up. In January 2021 he had a stroke.   He was noted to have M2/M3 branch occlusion. He is now on Eliquis. He is quit smoking. No nausea no vomiting no fever no chills no other issues. His last echo was in May 2022. No nausea no vomiting no fever no chills no PND no orthopnea no other cardiovascular issues EKG shows no change from previous EKG. His vitals are stable. His blood test done in July 2022 are acceptable cholesterol was 88    8/4/2023. Above reviewed. Patient came for follow-up he is doing well. He has medical history significant for small ascending arctic aneurysm by CAT scan, permanent atrial fibrillation, essential hypertension, cardiac murmur with moderate aortic stenosis, obesity with a BMI around 33 as he lost some weight and history of chronic headaches is with now felt to be secondary to chronic allergies. He also history of stroke with M2/M3 occlusion and has been on Eliquis. He has quit smoking. EK today shows atrial fibrillation heart rate 65 bpm which is not changed. He did blood test on 7/18/2023 which shows his electrolyte and [place]a profile is acceptable. Cholesterol is 88 with HDL 32 and LDL 40.  CT chest shows stable 44 mm ectasia of ascending aorta and he will need a repeat CAT scan in a year. Denies any exertional shortness of breath or any change in symptoms. Review of Systems   Constitutional: Negative for activity change, chills, diaphoresis, fever and unexpected weight change. HENT: Negative for congestion. Eyes: Negative for discharge and redness. Respiratory: Negative for cough, chest tightness, shortness of breath and wheezing. Cardiovascular: Negative. Negative for chest pain, palpitations and leg swelling. Gastrointestinal: Negative for abdominal pain, diarrhea and nausea. Endocrine: Negative. Genitourinary: Negative for decreased urine volume and urgency. Musculoskeletal: Negative. Negative for arthralgias, back pain and gait problem. Skin: Negative for rash and wound. Allergic/Immunologic: Negative. Neurological: Negative for dizziness, seizures, syncope, weakness, light-headedness and headaches. Hematological: Negative. Psychiatric/Behavioral: Negative for agitation and confusion. The patient is not nervous/anxious. Historical Information   Past Medical History:   Diagnosis Date   • Atrial fibrillation ([address])    • Hypertension    • Paroxysmal atrial fibrillation (HCC)    • Stroke ([address])    • Valvular heart disease      Past Surgical History:   Procedure Laterality Date   • HERNIA REPAIR     • KNEE SURGERY       Social History     Substance and Sexual Activity   Alcohol Use Not Currently   • Alcohol/week: 6.0 standard drinks of alcohol   • Types: 6 Cans of beer per week     Social History     Substance and Sexual Activity   Drug Use No     Social History     Tobacco Use   Smoking Status Never   Smokeless Tobacco Never     Family History:   Family History   Problem Relation Age of Onset   • No Known Problems Mother    • Stroke Father    • Heart failure Brother         heart problems   • Brain cancer Brother          0       Meds/Allergies     No Known Allergies    Current Outpatient Medications:   •  apixaban (Eliquis) 5 mg, Take 1 tablet (5 mg total) by mouth 2 (two) times a day, Disp: 56 tablet, Rfl: 0  •  aspirin 81 mg chewable tablet, Chew 1 tablet (81 mg total) daily, Disp: 30 tablet, Rfl: 0  •  atorvastatin (LIPITOR) 40 mg tablet, TAKE 1 TABLET BY MOUTH DAILY WITH DINNER, Disp: 90 tablet, Rfl: 3  •  loratadine (CLARITIN) 10 mg tablet, Take 10 mg by mouth daily, Disp: , Rfl:   •  metoprolol tartrate (LOPRESSOR) 25 mg tablet, Take 1 tablet (25 mg total) by mouth every 12 (twelve) hours, Disp: 180 tablet, Rfl: 3  •  verapamil (VERELAN PM) 120 MG 24 hr capsule, TAKE 1 CAPSULE (120 MG TOTAL) BY MOUTH DAILY AT BEDTIME, Disp: 90 capsule, Rfl: 1    Vitals: Blood pressure 130/80, pulse 65, height 6' (1.829 m), weight 110 kg (243 lb), SpO2 98 %.   ?  Body mass index is 32.96 kg/m². Wt Readings from Last 3 Encounters:   08/04/23 110 kg (243 lb)   01/27/23 112 kg (247 lb)   07/01/22 115 kg (253 lb)     Vitals:    08/04/23 1553   Weight: 110 kg (243 lb)     BP Readings from Last 3 Encounters:   08/04/23 130/80   01/27/23 120/80   07/01/22 110/70         Physical Exam  Constitutional:       General: He is not in acute distress. Appearance: He is well-developed. He is not diaphoretic. Neck:      Thyroid: No thyromegaly. Vascular: No JVD. Cardiovascular:      Rate and Rhythm: Normal rate. Rhythm irregularly irregular. Heart sounds: S1 normal and S2 normal. Heart sounds not distant. Murmur heard. Systolic murmur is present. No friction rub. No gallop. No S3 or S4 sounds. Comments: S1-S2 irregularly regular with 3/6 ejection systolic murmur. Pulmonary:      Effort: Pulmonary effort is normal. No respiratory distress. Breath sounds: Normal breath sounds. No wheezing or rales. Chest:      Chest wall: No tenderness. Abdominal:      General: There is no distension. Palpations: Abdomen is soft. Tenderness: There is no abdominal tenderness. Musculoskeletal:         General: No deformity. Cervical back: Neck supple. Lymphadenopathy:      Cervical: No cervical adenopathy. Skin:     General: Skin is warm and dry. Coloration: Skin is not pale. Findings: No rash. Neurological:      Mental Status: He is alert and oriented to person, place, and time. Psychiatric:         Judgment: Judgment normal.           Diagnostic Studies Review Cardio:    Echo Doppler done in May 2022 shows patient has normal LV systolic function EF 78%, mild AI moderate aortic stenosis, aortic valve area was thought to be 1.1 cm2 mean gradient 20 mmHg. There was mild-to-moderate AI. Small ascending aortic aneurysm 4 cm noted    Nuclear stress test.  Nuclear stress test done in January 2021 shows EF 50 55%.   Micro perfusion was normal.    Holter monitor in May 2022, shows rhythm is atrial fibrillation throughout the studies. Few PACs and PVCs noted average heart rate 68 beats per minute  EKG:  Twelve lead EKG 2022 shows atrial fibrillation heart rate 71 beats per minute. No other significant abnormality. Twelve-lead EKG 2023 shows atrial fibrillation heart rate 58 bpm.  No significant change from previous EKG    Twelve-lead EKG done on 2023 shows atrial fibrillation heart rate 65 bpm no change from previous EKG. Cardiac testing:   Results for orders placed during the hospital encounter of 21    Echo complete with contrast if indicated    Narrative  [address] [place] [address].  [place] [address] [address]  [phone]    Transthoracic Echocardiogram  2D, M-mode, Doppler, and Color Doppler    Study date:  2021    Patient: Cande Rainey  MR number: [id number]  Account number: [de-identified]  : 1955  Age: 60 years  Gender: Male  Status: Outpatient  Location: Bedside  Height: 71 in  Weight: 285.3 lb  BP: 139/ 93 mmHg    Indications: TIA    Diagnoses: G45.9 - Transient cerebral ischemic attack, unspecified    Sonographer:  KELVIN Araiza  Primary Physician:  Cat Broussard MD  Referring Physician:  HAILEE Chacon  Group:  [facility]  Interpreting Physician:  Simon Mortimer, MD    SUMMARY    PROCEDURE INFORMATION:  This was a technically difficult study. LEFT VENTRICLE:  Systolic function was normal. Ejection fraction was estimated in the range of 60 % to 65 % to be 65 %. Although no diagnostic regional wall motion abnormality was identified, this possibility cannot be completely excluded on the basis of this study. Wall thickness was moderately increased. There was moderate concentric hypertrophy. LEFT ATRIUM:  The atrium was mildly to moderately dilated.     ATRIAL SEPTUM:  No good quality bubble study was recorded    RIGHT ATRIUM:  The atrium was mildly to moderately dilated. MITRAL VALVE:  There was mild to moderate annular calcification. There was mild regurgitation. AORTIC VALVE:  The valve was trileaflet. Leaflets exhibited moderately increased thickness, moderate calcification, and moderately reduced cuspal separation. Transaortic velocity was increased due to valvular stenosis. There was moderate stenosis. ROCÍO 1.1 cm2, mean gradient 20 mm of hg and peak around 40 mm of hg. There was mild to moderate regurgitation. TRICUSPID VALVE:  There was trace regurgitation. The tricuspid jet envelope definition was inadequate for estimation of RV systolic pressure. AORTA:  The root exhibited upper normal normal size. Size around 4 cm    HISTORY: PRIOR HISTORY: TIA,A.Fib.,HTN,Valvular heart disease. PROCEDURE: The procedure was performed at the bedside. This was a routine study. The transthoracic approach was used. The study included complete 2D imaging, M-mode, complete spectral Doppler, and color Doppler. The heart rate was 107 bpm,  at the start of the study. Images were obtained from the parasternal, apical, subcostal, and suprasternal notch acoustic windows. Intravenous contrast (agitated saline) was administered to evaluate possible R-L intracardiac shunting. This  was a technically difficult study. LEFT VENTRICLE: Size was normal. Systolic function was normal. Ejection fraction was estimated in the range of 60 % to 65 % to be 65 %. Although no diagnostic regional wall motion abnormality was identified, this possibility cannot be  completely excluded on the basis of this study. Wall thickness was moderately increased. There was moderate concentric hypertrophy. DOPPLER: The study was not technically sufficient to allow evaluation of LV diastolic function, due to  atrial fibrillation. RIGHT VENTRICLE: The size was normal. Systolic function was normal.    LEFT ATRIUM: The atrium was mildly to moderately dilated.     ATRIAL SEPTUM: No good quality bubble study was recorded    RIGHT ATRIUM: The atrium was mildly to moderately dilated. MITRAL VALVE: There was mild to moderate annular calcification. There was mild calcification. There was normal leaflet separation. DOPPLER: The transmitral velocity was within the normal range. There was no evidence for stenosis. There was  mild regurgitation. AORTIC VALVE: The valve was trileaflet. Leaflets exhibited moderately increased thickness, moderate calcification, and moderately reduced cuspal separation. DOPPLER: Transaortic velocity was increased due to valvular stenosis. There was  moderate stenosis. ROCÍO 1.1 cm2, mean gradient 20 mm of hg and peak around 40 mm of hg. There was mild to moderate regurgitation. TRICUSPID VALVE: DOPPLER: There was trace regurgitation. The tricuspid jet envelope definition was inadequate for estimation of RV systolic pressure. PULMONIC VALVE: DOPPLER: There was no significant regurgitation. PERICARDIUM: There was no thickening or calcification. There was no pericardial effusion. AORTA: The root exhibited upper normal normal size. Size around 4 cm    SYSTEMIC VEINS: IVC: The inferior vena cava was not well visualized. SYSTEM MEASUREMENT TABLES    2D  EF (Teich): 54.08 %  %FS: 27.98 %  Ao Diam: 3.84 cm  EDV(Teich): 106.84 ml  ESV(Teich): 49.06 ml  IVSd: 1.36 cm  LA Area: 21.24 cm2  LA Diam: 4.39 cm  LVIDd: 4.79 cm  LVIDs: 3.45 cm  LVOT Diam: 2.18 cm  LVPWd: 1.32 cm  RA Area: 27.21 cm2  RVIDd: 4.12 cm  SV (Teich): 57.78 ml    PW  E' Lat: 0.08 m/s  E' Sept: 0.13 m/s  LVOT Env. Ti: 308.28 ms  LVOT VTI: 17.64 cm  LVOT Vmax: 0.89 m/s  LVOT Vmean: 0.57 m/s  LVOT maxPG: 3.19 mmHg  LVOT meanP.51 mmHg    [employer] Commission Accredited Echocardiography Laboratory    Prepared and electronically signed by    Jaelyn Mayen MD  Signed 2021 14:54:03      Results for orders placed during the hospital encounter of 21    NM Myocardial Perfusion Spect (Exercise Induced Stress and/or Rest)    [address].  [place] [address] [address] [place] [address]  [phone]    Regadenoson    Patient: Dori Catalan  MR number: [id number]  Account number: [de-identified]  : 1955  Age: 60 years  Gender: Male  Status: Outpatient  Location: Stress lab  Height: 73 in  Weight: 286 lb  BP: 131/ 74 mmHg    Allergies: NO KNOWN ALLERGIES    Primary Physician:  Mary Cameron MD  RN:  Carolin Kerr RN  Interpreting Physician:  Niko Bryant MD    INDICATIONS: Coronary artery disease. HISTORY: The patient is a 60year old  male. Chest pain status: no chest pain. Coronary artery disease risk factors: dyslipidemia and family history of premature coronary artery disease. Small, acute cortical infarct in right MCA  territory, chronic Afib Medications: a beta blocker, clopidogrel, and a lipid lowering agent. REST ECG: Atrial fibrillation. PROCEDURE: The study was performed in the the Stress lab. A regadenoson infusion pharmacologic stress test was performed. Systolic blood pressure was 131 mmHg, at the start of the study. Diastolic blood pressure was 74 mmHg, at the start  of the study. The heart rate was 75 bpm, at the start of the study. IV double checked. Regadenoson protocol:  Time HR bpm SBP mmHg DBP mmHg Symptoms Rhythm/conduct  Baseline 10:18 75 131 74 none A-fib  Immediate 13:20 -- -- -- -- --  1 min 10:19 101 128 70 mild dyspnea, flushing same as above, occasional PVC's  2 min 10:20 130 135 65 subsiding A-fib, occasional PVC's  3 min 10:21 109 121 60 subsiding A-fib, occasional PVC's  4 min 10:22 103 121 70 subsiding A-fib, occasional PVC's  5 min 10:23 101 119 66 subsiding same as above    STRESS SUMMARY: Duration of pharmacologic stress was 3 min and 0 sec. Maximal heart rate during stress was 131 bpm ( 83 % of maximal predicted heart rate).  The rate-pressure product for the peak heart rate and blood pressure was 53190. There was no chest pain during stress. The stress test was terminated due to protocol completion. The stress ECG was equivocal for ischemia. There were no stress arrhythmias or conduction abnormalities. Patient has chronic atrial  fibrillation. ISOTOPE ADMINISTRATION:  Resting isotope administration Stress isotope administration  Agent Tetrofosmin Tetrofosmin  Dose 11 mCi 32.8 mCi  Injection time 08:25 10:19    The radiopharmaceutical was injected at the peak effect of pharmacologic stress. MYOCARDIAL PERFUSION IMAGING:  The image quality was fair. Rotating projection images reveal mild diaphragmatic attenuation. Left ventricular size was normal. The TID ratio was 1. 18. The right ventricle was dilated. PERFUSION DEFECTS:  -  There was a small, mildly severe, fixed myocardial perfusion defect of the apical wall likely. As there appeared normalized on prone images    GATED SPECT:  The calculated left ventricular ejection fraction was 52 %. Left ventricular ejection fraction was within normal limits by visual estimate. There was no left ventricular regional abnormality. SUMMARY:  -  Stress results: Target heart rate was not achieved. There was no chest pain during stress. -  ECG conclusions: The stress ECG was equivocal for ischemia. -  Perfusion imaging: The right ventricle was dilated. There was a small, mildly severe, fixed myocardial perfusion defect of the apical wall likely. As there appeared normalized on prone images  -  Gated SPECT: The calculated left ventricular ejection fraction was 52 %. Left ventricular ejection fraction was within normal limits by visual estimate. There was no left ventricular regional abnormality. IMPRESSIONS: Probably normal study after pharmacologic vasodilation without reproduction of symptoms. EF is 50-55% Myocardial perfusion imaging was normal at rest and with stress.  Left ventricular systolic function was normal. Diagnostic  sensitivity was limited by submaximal stress. Prepared and signed by    Varun Jacome MD  Signed 01/07/2021 16:03:47      Lab Review   Lab Results   Component Value Date    WBC 6.6 07/18/2022    HGB 14.7 07/18/2022    HCT 45.1 07/18/2022    MCV 87 07/18/2022    RDW 13.3 07/18/2022     07/18/2022     BMP:  Lab Results   Component Value Date    SODIUM 142 07/18/2022    K 4.3 07/18/2022     07/18/2022    CO2 25 07/18/2022    ANIONGAP 13.0 12/26/2015    BUN 13 07/18/2022    CREATININE 0.67 (L) 07/18/2022    GLUC 102 (H) 07/18/2022    CALCIUM 9.0 06/09/2021    CORRECTEDCA 8.9 01/05/2021    EGFR 104 07/18/2022    MG 2.1 01/06/2021     Troponins:    LFT:  Lab Results   Component Value Date    AST 23 07/18/2022    ALT 23 07/18/2022    ALKPHOS 63 01/06/2021    TP 6.8 07/18/2022    ALB 3.9 07/18/2022        Lab Results   Component Value Date    HGBA1C 5.4 01/06/2021     Lipid Profile:   Lab Results   Component Value Date    CHOLESTEROL 88 (L) 07/18/2022    HDL 32 (L) 07/18/2022    LDLCALC 41 07/18/2022    TRIG 69 07/18/2022     Lab Results   Component Value Date    CHOLESTEROL 88 (L) 07/18/2022    CHOLESTEROL 106 07/16/2021     Lab Results   Component Value Date    TROPONINI <0.02 01/05/2021             Dr. Varun Jacome MD [facility]      "This note has been constructed using a voice recognition system. Therefore there may be syntax, spelling, and/or grammatical errors.  Please call if you have any questions. "

## 2023-09-25 DIAGNOSIS — I10 ESSENTIAL HYPERTENSION: ICD-10-CM

## 2023-09-25 DIAGNOSIS — I48.91 ATRIAL FIBRILLATION, UNSPECIFIED TYPE (HCC): ICD-10-CM

## 2023-09-25 DIAGNOSIS — G43.109 MIGRAINE WITH AURA AND WITHOUT STATUS MIGRAINOSUS, NOT INTRACTABLE: ICD-10-CM

## 2023-09-25 RX ORDER — VERAPAMIL HYDROCHLORIDE 120 MG/1
120 CAPSULE, EXTENDED RELEASE ORAL
Qty: 90 CAPSULE | Refills: 1 | Status: SHIPPED | OUTPATIENT
Start: 2023-09-25

## 2023-10-03 ENCOUNTER — TELEPHONE (OUTPATIENT)
Dept: CARDIOLOGY CLINIC | Facility: CLINIC | Age: 68
End: 2023-10-03

## 2023-10-03 DIAGNOSIS — I48.91 ATRIAL FIBRILLATION, UNSPECIFIED TYPE (HCC): ICD-10-CM

## 2023-12-01 ENCOUNTER — TELEPHONE (OUTPATIENT)
Dept: CARDIOLOGY CLINIC | Facility: CLINIC | Age: 68
End: 2023-12-01

## 2023-12-01 NOTE — TELEPHONE ENCOUNTER
Pre. Op. Clearance note for Dental Procedures - Cardiology    Buster Layman   80 y.o.  male  1955      ABDOUL Silverman [place] :     Patient can hold  Aspirin for  5-7 days as required for surgery. Patient can hold Eliquis for 3 days before the procedure. Please restart after the procedure when okay from surgical point of view and advise patient to contact our office. If you have any question please do not hesitate to call us at our office of [facility]. Phone # [phone]    Dental Procedure:    yes  Patient can have anesthesia for dental procedure. no  Patient will need to pre-medicate. no  Patient can have epinephrine. no  Does patient have any contraindications to the treatment, if so, please                 indicate contraindications.     Lab Results   Component Value Date    WBC 6.6 07/18/2022    HGB 14.7 07/18/2022    HCT 45.1 07/18/2022    MCV 87 07/18/2022     07/18/2022     Lab Results   Component Value Date    CREATININE 0.67 (L) 07/18/2022     No results found for: "GLUF"    Verna Wong MD  12/1/2023  12:26 PM

## 2023-12-01 NOTE — TELEPHONE ENCOUNTER
Pt.advised of Med Hold instructions;pt.verbalized understanding. Clearance sent to Dr. Thierry Maya.

## 2023-12-28 DIAGNOSIS — I48.91 ATRIAL FIBRILLATION, UNSPECIFIED TYPE (HCC): ICD-10-CM

## 2023-12-28 RX ORDER — APIXABAN 5 MG/1
5 TABLET, FILM COATED ORAL 2 TIMES DAILY
Qty: 180 TABLET | Refills: 0 | Status: SHIPPED | OUTPATIENT
Start: 2023-12-28

## 2024-03-27 DIAGNOSIS — G43.109 MIGRAINE WITH AURA AND WITHOUT STATUS MIGRAINOSUS, NOT INTRACTABLE: ICD-10-CM

## 2024-03-27 RX ORDER — VERAPAMIL HYDROCHLORIDE 120 MG/1
120 CAPSULE, EXTENDED RELEASE ORAL
Qty: 90 CAPSULE | Refills: 1 | Status: SHIPPED | OUTPATIENT
Start: 2024-03-27

## 2024-05-09 DIAGNOSIS — I48.91 ATRIAL FIBRILLATION, UNSPECIFIED TYPE (HCC): ICD-10-CM

## 2024-05-10 RX ORDER — APIXABAN 5 MG/1
5 TABLET, FILM COATED ORAL 2 TIMES DAILY
Qty: 60 TABLET | Refills: 0 | Status: SHIPPED | OUTPATIENT
Start: 2024-05-10

## 2024-05-10 NOTE — TELEPHONE ENCOUNTER
Last office note state return in 6M. Patient needs an appointment. Please contact the patient to schedule an appointment.        Patient needs updated blood work. Please place orders. A 30D courtesy refill was provided.

## 2024-06-13 DIAGNOSIS — I48.91 ATRIAL FIBRILLATION, UNSPECIFIED TYPE (HCC): ICD-10-CM

## 2024-06-13 DIAGNOSIS — I63.9 ACUTE ISCHEMIC STROKE (HCC): ICD-10-CM

## 2024-06-13 NOTE — TELEPHONE ENCOUNTER
DISREGARD    Already requested by the pharmacy  
PAST SURGICAL HISTORY:  H/O arthroscopy of knee Left     S/P  section x2 ,     S/P foot surgery, right

## 2024-06-14 RX ORDER — ATORVASTATIN CALCIUM 40 MG/1
40 TABLET, FILM COATED ORAL
Qty: 30 TABLET | Refills: 0 | Status: SHIPPED | OUTPATIENT
Start: 2024-06-14

## 2024-06-14 RX ORDER — APIXABAN 5 MG/1
5 TABLET, FILM COATED ORAL 2 TIMES DAILY
Qty: 60 TABLET | Refills: 0 | Status: SHIPPED | OUTPATIENT
Start: 2024-06-14

## 2024-07-01 ENCOUNTER — APPOINTMENT (EMERGENCY)
Dept: RADIOLOGY | Facility: HOSPITAL | Age: 69
End: 2024-07-01
Payer: MEDICARE

## 2024-07-01 ENCOUNTER — HOSPITAL ENCOUNTER (EMERGENCY)
Facility: HOSPITAL | Age: 69
Discharge: HOME | End: 2024-07-01
Attending: EMERGENCY MEDICINE
Payer: MEDICARE

## 2024-07-01 VITALS
RESPIRATION RATE: 22 BRPM | SYSTOLIC BLOOD PRESSURE: 169 MMHG | BODY MASS INDEX: 33.36 KG/M2 | HEART RATE: 98 BPM | WEIGHT: 246 LBS | OXYGEN SATURATION: 97 % | TEMPERATURE: 98.8 F | DIASTOLIC BLOOD PRESSURE: 93 MMHG

## 2024-07-01 DIAGNOSIS — R50.9 FEVER: Primary | ICD-10-CM

## 2024-07-01 LAB
ALBUMIN SERPL BCG-MCNC: 4.2 G/DL (ref 3.5–5)
ALP SERPL-CCNC: 70 U/L (ref 34–104)
ALT SERPL W P-5'-P-CCNC: 21 U/L (ref 7–52)
ANION GAP SERPL CALCULATED.3IONS-SCNC: 7 MMOL/L (ref 4–13)
AST SERPL W P-5'-P-CCNC: 23 U/L (ref 13–39)
BACTERIA UR QL AUTO: NORMAL /HPF
BASOPHILS # BLD AUTO: 0.03 THOUSANDS/ÂΜL (ref 0–0.1)
BASOPHILS NFR BLD AUTO: 0 % (ref 0–1)
BILIRUB SERPL-MCNC: 1.32 MG/DL (ref 0.2–1)
BILIRUB UR QL STRIP: NEGATIVE
BUN SERPL-MCNC: 13 MG/DL (ref 5–25)
CALCIUM SERPL-MCNC: 8.9 MG/DL (ref 8.4–10.2)
CARDIAC TROPONIN I PNL SERPL HS: 11 NG/L
CHLORIDE SERPL-SCNC: 102 MMOL/L (ref 96–108)
CLARITY UR: CLEAR
CO2 SERPL-SCNC: 26 MMOL/L (ref 21–32)
COLOR UR: COLORLESS
CREAT SERPL-MCNC: 0.79 MG/DL (ref 0.6–1.3)
EOSINOPHIL # BLD AUTO: 0.01 THOUSAND/ÂΜL (ref 0–0.61)
EOSINOPHIL NFR BLD AUTO: 0 % (ref 0–6)
ERYTHROCYTE [DISTWIDTH] IN BLOOD BY AUTOMATED COUNT: 13.2 % (ref 11.6–15.1)
FLUAV RNA RESP QL NAA+PROBE: NEGATIVE
FLUBV RNA RESP QL NAA+PROBE: NEGATIVE
GFR SERPL CREATININE-BSD FRML MDRD: 92 ML/MIN/1.73SQ M
GLUCOSE SERPL-MCNC: 118 MG/DL (ref 65–140)
GLUCOSE UR STRIP-MCNC: NEGATIVE MG/DL
HCT VFR BLD AUTO: 45.4 % (ref 36.5–49.3)
HGB BLD-MCNC: 15.3 G/DL (ref 12–17)
HGB UR QL STRIP.AUTO: ABNORMAL
IMM GRANULOCYTES # BLD AUTO: 0.02 THOUSAND/UL (ref 0–0.2)
IMM GRANULOCYTES NFR BLD AUTO: 0 % (ref 0–2)
KETONES UR STRIP-MCNC: NEGATIVE MG/DL
LACTATE SERPL-SCNC: 0.8 MMOL/L (ref 0.5–2)
LEUKOCYTE ESTERASE UR QL STRIP: NEGATIVE
LYMPHOCYTES # BLD AUTO: 1.03 THOUSANDS/ÂΜL (ref 0.6–4.47)
LYMPHOCYTES NFR BLD AUTO: 14 % (ref 14–44)
MCH RBC QN AUTO: 28.9 PG (ref 26.8–34.3)
MCHC RBC AUTO-ENTMCNC: 33.7 G/DL (ref 31.4–37.4)
MCV RBC AUTO: 86 FL (ref 82–98)
MONOCYTES # BLD AUTO: 0.77 THOUSAND/ÂΜL (ref 0.17–1.22)
MONOCYTES NFR BLD AUTO: 10 % (ref 4–12)
NEUTROPHILS # BLD AUTO: 5.72 THOUSANDS/ÂΜL (ref 1.85–7.62)
NEUTS SEG NFR BLD AUTO: 76 % (ref 43–75)
NITRITE UR QL STRIP: NEGATIVE
NON-SQ EPI CELLS URNS QL MICRO: NORMAL /HPF
NRBC BLD AUTO-RTO: 0 /100 WBCS
PH UR STRIP.AUTO: 6 [PH]
PLATELET # BLD AUTO: 153 THOUSANDS/UL (ref 149–390)
PMV BLD AUTO: 10.8 FL (ref 8.9–12.7)
POTASSIUM SERPL-SCNC: 3.4 MMOL/L (ref 3.5–5.3)
PROCALCITONIN SERPL-MCNC: 0.14 NG/ML
PROT SERPL-MCNC: 7.5 G/DL (ref 6.4–8.4)
PROT UR STRIP-MCNC: NEGATIVE MG/DL
RBC # BLD AUTO: 5.3 MILLION/UL (ref 3.88–5.62)
RBC #/AREA URNS AUTO: NORMAL /HPF
RSV RNA RESP QL NAA+PROBE: NEGATIVE
SARS-COV-2 RNA RESP QL NAA+PROBE: NEGATIVE
SODIUM SERPL-SCNC: 135 MMOL/L (ref 135–147)
SP GR UR STRIP.AUTO: <1.005 (ref 1–1.03)
UROBILINOGEN UR STRIP-ACNC: <2 MG/DL
WBC # BLD AUTO: 7.58 THOUSAND/UL (ref 4.31–10.16)
WBC #/AREA URNS AUTO: NORMAL /HPF

## 2024-07-01 PROCEDURE — 36415 COLL VENOUS BLD VENIPUNCTURE: CPT | Performed by: EMERGENCY MEDICINE

## 2024-07-01 PROCEDURE — 84145 PROCALCITONIN (PCT): CPT | Performed by: EMERGENCY MEDICINE

## 2024-07-01 PROCEDURE — 81001 URINALYSIS AUTO W/SCOPE: CPT | Performed by: EMERGENCY MEDICINE

## 2024-07-01 PROCEDURE — 83605 ASSAY OF LACTIC ACID: CPT | Performed by: EMERGENCY MEDICINE

## 2024-07-01 PROCEDURE — 85025 COMPLETE CBC W/AUTO DIFF WBC: CPT | Performed by: EMERGENCY MEDICINE

## 2024-07-01 PROCEDURE — 84484 ASSAY OF TROPONIN QUANT: CPT | Performed by: EMERGENCY MEDICINE

## 2024-07-01 PROCEDURE — 93005 ELECTROCARDIOGRAM TRACING: CPT

## 2024-07-01 PROCEDURE — 99284 EMERGENCY DEPT VISIT MOD MDM: CPT | Performed by: EMERGENCY MEDICINE

## 2024-07-01 PROCEDURE — 87040 BLOOD CULTURE FOR BACTERIA: CPT | Performed by: EMERGENCY MEDICINE

## 2024-07-01 PROCEDURE — 80053 COMPREHEN METABOLIC PANEL: CPT | Performed by: EMERGENCY MEDICINE

## 2024-07-01 PROCEDURE — 0241U HB NFCT DS VIR RESP RNA 4 TRGT: CPT | Performed by: EMERGENCY MEDICINE

## 2024-07-01 PROCEDURE — 99284 EMERGENCY DEPT VISIT MOD MDM: CPT

## 2024-07-01 PROCEDURE — 71045 X-RAY EXAM CHEST 1 VIEW: CPT

## 2024-07-01 NOTE — ED PROVIDER NOTES
History  Chief Complaint   Patient presents with    Fever     Started with chills and fever two days ago. Tickle in throat, had two tylenol two hours before coming,     Patient is a 68-year-old male with a history of paroxysmal A-fib, prior CVA on Eliquis, hyperlipidemia, hypertension that presents emergency department with complaint of 2 days of a fever, diaphoresis and facial flushing.  Patient denies sick contacts.  He denies cough, vomiting, abdominal pain, diarrhea.  Outpatient COVID test was negative.  Patient took some Tylenol prior to coming to the ED this evening.      History provided by:  Patient   used: No    Fever  Associated symptoms: fever    Associated symptoms: no abdominal pain, no chest pain, no cough, no diarrhea, no nausea, no rash, no shortness of breath, no vomiting and no wheezing        Prior to Admission Medications   Prescriptions Last Dose Informant Patient Reported? Taking?   Eliquis 5 MG   No No   Sig: TAKE 1 TABLET BY MOUTH TWICE A DAY   aspirin 81 mg chewable tablet  Self No No   Sig: Chew 1 tablet (81 mg total) daily   atorvastatin (LIPITOR) 40 mg tablet   No No   Sig: TAKE 1 TABLET BY MOUTH EVERY DAY WITH DINNER   loratadine (CLARITIN) 10 mg tablet  Self Yes No   Sig: Take 10 mg by mouth daily   metoprolol tartrate (LOPRESSOR) 25 mg tablet   No No   Sig: TAKE 1 TABLET (25 MG TOTAL) BY MOUTH EVERY 12 (TWELVE) HOURS   verapamil (Verelan) 120 MG 24 hr capsule   No No   Sig: Take 1 capsule (120 mg total) by mouth daily at bedtime      Facility-Administered Medications: None       Past Medical History:   Diagnosis Date    Atrial fibrillation (HCC)     Hypertension     Paroxysmal atrial fibrillation (HCC)     Stroke (HCC)     Valvular heart disease        Past Surgical History:   Procedure Laterality Date    HERNIA REPAIR      KNEE SURGERY         Family History   Problem Relation Age of Onset    No Known Problems Mother     Stroke Father     Heart failure Brother          heart problems    Brain cancer Brother               I have reviewed and agree with the history as documented.    E-Cigarette/Vaping    E-Cigarette Use Never User      E-Cigarette/Vaping Substances    Nicotine No     THC No     CBD No     Flavoring No     Other No     Unknown No      Social History     Tobacco Use    Smoking status: Never    Smokeless tobacco: Never   Vaping Use    Vaping status: Never Used   Substance Use Topics    Alcohol use: Not Currently     Alcohol/week: 6.0 standard drinks of alcohol     Types: 6 Cans of beer per week    Drug use: Yes     Types: Marijuana     Comment: vape       Review of Systems   Constitutional:  Positive for diaphoresis and fever. Negative for chills.   Respiratory:  Negative for cough, shortness of breath and wheezing.    Cardiovascular:  Negative for chest pain and palpitations.   Gastrointestinal:  Negative for abdominal pain, constipation, diarrhea, nausea and vomiting.   Genitourinary:  Negative for dysuria, flank pain, hematuria and urgency.   Musculoskeletal:  Negative for back pain.   Skin:  Negative for color change and rash.   All other systems reviewed and are negative.      Physical Exam  Physical Exam  Vitals and nursing note reviewed.   Constitutional:       Appearance: He is well-developed.   HENT:      Head: Normocephalic and atraumatic.   Eyes:      Pupils: Pupils are equal, round, and reactive to light.   Cardiovascular:      Rate and Rhythm: Normal rate. Rhythm irregular.      Heart sounds: Murmur heard.   Pulmonary:      Effort: Pulmonary effort is normal.      Breath sounds: Normal breath sounds.   Abdominal:      General: Bowel sounds are normal. There is no distension.      Palpations: Abdomen is soft. There is no mass.      Tenderness: There is no abdominal tenderness. There is no guarding or rebound.   Musculoskeletal:      Cervical back: Normal range of motion and neck supple.   Skin:     General: Skin is warm and dry.      Capillary  Refill: Capillary refill takes less than 2 seconds.   Neurological:      General: No focal deficit present.      Mental Status: He is alert and oriented to person, place, and time.   Psychiatric:         Behavior: Behavior normal.         Thought Content: Thought content normal.         Judgment: Judgment normal.         Vital Signs  ED Triage Vitals [07/01/24 1759]   Temperature Pulse Respirations Blood Pressure SpO2   100.4 °F (38 °C) (!) 108 22 141/70 94 %      Temp Source Heart Rate Source Patient Position - Orthostatic VS BP Location FiO2 (%)   Tympanic Monitor Sitting Right arm --      Pain Score       No Pain           Vitals:    07/01/24 1759 07/01/24 2000 07/01/24 2015   BP: 141/70 151/82 169/93   Pulse: (!) 108 102 98   Patient Position - Orthostatic VS: Sitting           Visual Acuity      ED Medications  Medications - No data to display    Diagnostic Studies  Results Reviewed       Procedure Component Value Units Date/Time    Blood culture #1 [[id number]] Collected: 07/01/24 1907    Lab Status: Preliminary result Specimen: Blood from Arm, Left Updated: 07/03/24 0001     Blood Culture No Growth at 24 hrs.    Blood culture #2 [[id number]] Collected: 07/01/24 1908    Lab Status: Preliminary result Specimen: Blood from Arm, Right Updated: 07/03/24 0001     Blood Culture No Growth at 24 hrs.    Urine Microscopic [[id number]]  (Normal) Collected: 07/01/24 1938    Lab Status: Final result Specimen: Urine, Clean Catch Updated: 07/01/24 2044     RBC, UA 2-4 /hpf      WBC, UA None Seen /hpf      Epithelial Cells None Seen /hpf      Bacteria, UA None Seen /hpf     FLU/RSV/COVID - if FLU/RSV clinically relevant [[id number]]  (Normal) Collected: 07/01/24 1905    Lab Status: Final result Specimen: Nares from Nose Updated: 07/01/24 1959     SARS-CoV-2 Negative     INFLUENZA A PCR Negative     INFLUENZA B PCR Negative     RSV PCR Negative    Narrative:      FOR PEDIATRIC PATIENTS - copy/paste COVID Guidelines URL to  browser: [email or web address]    SARS-CoV-2 assay is a Nucleic Acid Amplification assay intended for the  qualitative detection of nucleic acid from SARS-CoV-2 in nasopharyngeal  swabs. Results are for the presumptive identification of SARS-CoV-2 RNA.    Positive results are indicative of infection with SARS-CoV-2, the virus  causing COVID-19, but do not rule out bacterial infection or co-infection  with other viruses. Laboratories within the [place] and its  territories are required to report all positive results to the appropriate  public health authorities. Negative results do not preclude SARS-CoV-2  infection and should not be used as the sole basis for treatment or other  patient management decisions. Negative results must be combined with  clinical observations, patient history, and epidemiological information.  This test has not been FDA cleared or approved.    This test has been authorized by FDA under an Emergency Use Authorization  (EUA). This test is only authorized for the duration of time the  declaration that circumstances exist justifying the authorization of the  emergency use of an in vitro diagnostic tests for detection of SARS-CoV-2  virus and/or diagnosis of COVID-19 infection under section 564(b)(1) of  the Act, 21 U.S.C. 360bbb-3(b)(1), unless the authorization is terminated  or revoked sooner. The test has been validated but independent review by FDA  and CLIA is pending.    Test performed using [employer] GeneXpert: This RT-PCR assay targets N2,  a region unique to SARS-CoV-2. A conserved region in the E-gene was chosen  for pan-Sarbecovirus detection which includes SARS-CoV-2.    According to CMS-2020-01-R, this platform meets the definition of high-throughput technology.    Procalcitonin [[id number]]  (Normal) Collected: 07/01/24 1905    Lab Status: Final result Specimen: Blood from Arm, Left Updated: 07/01/24 1957     Procalcitonin  0.14 ng/ml     UA w Reflex to Microscopic w Reflex to Culture [[id number]]  (Abnormal) Collected: 07/01/24 1938    Lab Status: Final result Specimen: Urine, Clean Catch Updated: 07/01/24 1955     Color, UA Colorless     Clarity, UA Clear     Specific Gravity, UA <1.005     pH, UA 6.0     Leukocytes, UA Negative     Nitrite, UA Negative     Protein, UA Negative mg/dl      Glucose, UA Negative mg/dl      Ketones, UA Negative mg/dl      Urobilinogen, UA <2.0 mg/dl      Bilirubin, UA Negative     Occult Blood, UA Moderate    HS Troponin 0hr (reflex protocol) [[id number]]  (Normal) Collected: 07/01/24 1905    Lab Status: Final result Specimen: Blood from Arm, Left Updated: 07/01/24 1954     hs TnI 0hr 11 ng/L     Lactic acid, plasma (w/reflex if result > 2.0) [[id number]]  (Normal) Collected: 07/01/24 1905    Lab Status: Final result Specimen: Blood from Arm, Left Updated: 07/01/24 1950     LACTIC ACID 0.8 mmol/L     Narrative:      Result may be elevated if tourniquet was used during collection.    Comprehensive metabolic panel [[id number]]  (Abnormal) Collected: 07/01/24 1905    Lab Status: Final result Specimen: Blood from Arm, Left Updated: 07/01/24 1940     Sodium 135 mmol/L      Potassium 3.4 mmol/L      Chloride 102 mmol/L      CO2 26 mmol/L      ANION GAP 7 mmol/L      BUN 13 mg/dL      Creatinine 0.79 mg/dL      Glucose 118 mg/dL      Calcium 8.9 mg/dL      AST 23 U/L      ALT 21 U/L      Alkaline Phosphatase 70 U/L      Total Protein 7.5 g/dL      Albumin 4.2 g/dL      Total Bilirubin 1.32 mg/dL      eGFR 92 ml/min/1.73sq m     Narrative:      National Kidney Disease Foundation guidelines for Chronic Kidney Disease (CKD):     Stage 1 with normal or high GFR (GFR > 90 mL/min/1.73 square meters)    Stage 2 Mild CKD (GFR = 60-89 mL/min/1.73 square meters)    Stage 3A Moderate CKD (GFR = 45-59 mL/min/1.73 square meters)    Stage 3B Moderate CKD (GFR = 30-44 mL/min/1.73 square meters)    Stage 4 Severe CKD (GFR = 15-29  mL/min/1.73 square meters)    Stage 5 End Stage CKD (GFR <15 mL/min/1.73 square meters)  Note: GFR calculation is accurate only with a steady state creatinine    CBC and differential [[id number]]  (Abnormal) Collected: 07/01/24 1905    Lab Status: Final result Specimen: Blood from Arm, Left Updated: 07/01/24 1918     WBC 7.58 Thousand/uL      RBC 5.30 Million/uL      Hemoglobin 15.3 g/dL      Hematocrit 45.4 %      MCV 86 fL      MCH 28.9 pg      MCHC 33.7 g/dL      RDW 13.2 %      MPV 10.8 fL      Platelets 153 Thousands/uL      nRBC 0 /100 WBCs      Segmented % 76 %      Immature Grans % 0 %      Lymphocytes % 14 %      Monocytes % 10 %      Eosinophils Relative 0 %      Basophils Relative 0 %      Absolute Neutrophils 5.72 Thousands/µL      Absolute Immature Grans 0.02 Thousand/uL      Absolute Lymphocytes 1.03 Thousands/µL      Absolute Monocytes 0.77 Thousand/µL      Eosinophils Absolute 0.01 Thousand/µL      Basophils Absolute 0.03 Thousands/µL                    XR chest 1 view portable   ED Interpretation by Faith Yanez DO (07/01 1942)   Cardiomegaly.  No acute process                 Procedures  Procedures         ED Course                                             Medical Decision Making  68-year-old male in the ED with complaint of fever and diaphoresis.  Labs, chest x-ray, quad viral screen, urinalysis ordered and reviewed.  No acute pathology identified.  Patient is well-appearing at this time.  Blood cultures ordered and pending.  Patient will be discharged to home with recommendation for outpatient follow-up with primary care physician.    Amount and/or Complexity of Data Reviewed  Labs: ordered.  Radiology: ordered and independent interpretation performed.             Disposition  Final diagnoses:   Fever     Time reflects when diagnosis was documented in both MDM as applicable and the Disposition within this note       Time User Action Codes Description Comment    7/1/2024  8:05 PM  Faith Yanez Add [R50.9] Fever           ED Disposition       ED Disposition   Discharge    Condition   Stable    Date/Time   Mon Jul 1, 2024  8:05 PM    Comment   Regulo Daly discharge to home/self care.                   Follow-up Information       Follow up With Specialties Details Why Contact Info    Светлана Thomas MD  Schedule an appointment as soon as possible for a visit in 1 day for follow up [address] [address]  [phone]              Discharge Medication List as of 7/1/2024  8:05 PM        CONTINUE these medications which have NOT CHANGED    Details   aspirin 81 mg chewable tablet Chew 1 tablet (81 mg total) daily, Starting Fri 1/8/2021, Normal      atorvastatin (LIPITOR) 40 mg tablet TAKE 1 TABLET BY MOUTH EVERY DAY WITH DINNER, Starting Fri 6/14/2024, Normal      Eliquis 5 MG TAKE 1 TABLET BY MOUTH TWICE A DAY, Starting Fri 6/14/2024, Normal      loratadine (CLARITIN) 10 mg tablet Take 10 mg by mouth daily, Historical Med      metoprolol tartrate (LOPRESSOR) 25 mg tablet TAKE 1 TABLET (25 MG TOTAL) BY MOUTH EVERY 12 (TWELVE) HOURS, Starting Mon 9/25/2023, Normal      verapamil (Verelan) 120 MG 24 hr capsule Take 1 capsule (120 mg total) by mouth daily at bedtime, Starting Wed 3/27/2024, Normal             No discharge procedures on file.    PDMP Review       None            ED Provider  Electronically Signed by             Faith Yanez DO  07/03/24 0105

## 2024-07-02 LAB
QRS AXIS: -35 DEGREES
QRSD INTERVAL: 106 MS
QT INTERVAL: 362 MS
QTC INTERVAL: 494 MS
T WAVE AXIS: 31 DEGREES
VENTRICULAR RATE: 112 BPM

## 2024-07-02 PROCEDURE — 93010 ELECTROCARDIOGRAM REPORT: CPT | Performed by: INTERNAL MEDICINE

## 2024-07-02 NOTE — DISCHARGE INSTRUCTIONS
Return to the ER for further concerns or worsening symptoms  Follow up with your primary care physician in 1-2 days  Continue Tylenol or ibuprofen as needed for fever

## 2024-07-06 DIAGNOSIS — I63.9 ACUTE ISCHEMIC STROKE (HCC): ICD-10-CM

## 2024-07-07 LAB
BACTERIA BLD CULT: NORMAL
BACTERIA BLD CULT: NORMAL

## 2024-07-07 RX ORDER — ATORVASTATIN CALCIUM 40 MG/1
40 TABLET, FILM COATED ORAL
Qty: 90 TABLET | Refills: 1 | Status: SHIPPED | OUTPATIENT
Start: 2024-07-07

## 2024-07-12 DIAGNOSIS — I48.91 ATRIAL FIBRILLATION, UNSPECIFIED TYPE (HCC): ICD-10-CM

## 2024-07-12 NOTE — TELEPHONE ENCOUNTER
Reason for call:   [x] Refill   [] Prior Auth  [] Other:     Office:   [] PCP/Provider -   [x] Specialty/Provider -         Does the patient have enough for 3 days?   [x] Yes   [] No - Send as HP to POD

## 2024-09-21 DIAGNOSIS — G43.109 MIGRAINE WITH AURA AND WITHOUT STATUS MIGRAINOSUS, NOT INTRACTABLE: ICD-10-CM

## 2024-09-23 DIAGNOSIS — I48.91 ATRIAL FIBRILLATION, UNSPECIFIED TYPE (HCC): ICD-10-CM

## 2024-09-23 DIAGNOSIS — I10 ESSENTIAL HYPERTENSION: ICD-10-CM

## 2024-09-23 RX ORDER — METOPROLOL TARTRATE 25 MG/1
25 TABLET, FILM COATED ORAL EVERY 12 HOURS SCHEDULED
Qty: 180 TABLET | Refills: 3 | Status: SHIPPED | OUTPATIENT
Start: 2024-09-23

## 2024-09-23 RX ORDER — VERAPAMIL HYDROCHLORIDE 120 MG/1
120 CAPSULE, EXTENDED RELEASE ORAL
Qty: 90 CAPSULE | Refills: 1 | Status: SHIPPED | OUTPATIENT
Start: 2024-09-23

## 2024-09-26 DIAGNOSIS — I48.91 ATRIAL FIBRILLATION, UNSPECIFIED TYPE (HCC): ICD-10-CM

## 2024-09-26 RX ORDER — APIXABAN 5 MG/1
5 TABLET, FILM COATED ORAL 2 TIMES DAILY
Qty: 180 TABLET | Refills: 3 | Status: SHIPPED | OUTPATIENT
Start: 2024-09-26

## 2024-11-27 ENCOUNTER — OFFICE VISIT (OUTPATIENT)
Dept: CARDIOLOGY CLINIC | Facility: CLINIC | Age: 69
End: 2024-11-27
Payer: MEDICARE

## 2024-11-27 VITALS
SYSTOLIC BLOOD PRESSURE: 120 MMHG | DIASTOLIC BLOOD PRESSURE: 80 MMHG | OXYGEN SATURATION: 98 % | HEART RATE: 68 BPM | BODY MASS INDEX: 33.32 KG/M2 | WEIGHT: 246 LBS | HEIGHT: 72 IN

## 2024-11-27 DIAGNOSIS — E66.9 OBESITY (BMI 30-39.9): ICD-10-CM

## 2024-11-27 DIAGNOSIS — Z86.73 HISTORY OF STROKE: ICD-10-CM

## 2024-11-27 DIAGNOSIS — I35.0 AORTIC STENOSIS, MODERATE: ICD-10-CM

## 2024-11-27 DIAGNOSIS — I48.91 ATRIAL FIBRILLATION, UNSPECIFIED TYPE (HCC): ICD-10-CM

## 2024-11-27 DIAGNOSIS — I71.21 ANEURYSM OF ASCENDING AORTA WITHOUT RUPTURE (HCC): ICD-10-CM

## 2024-11-27 DIAGNOSIS — I48.21 PERMANENT ATRIAL FIBRILLATION (HCC): Primary | ICD-10-CM

## 2024-11-27 DIAGNOSIS — E78.5 DYSLIPIDEMIA: ICD-10-CM

## 2024-11-27 DIAGNOSIS — I11.9 HYPERTENSIVE HEART DISEASE WITHOUT HEART FAILURE: ICD-10-CM

## 2024-11-27 DIAGNOSIS — I48.21 PERMANENT ATRIAL FIBRILLATION (HCC): ICD-10-CM

## 2024-11-27 PROCEDURE — 93000 ELECTROCARDIOGRAM COMPLETE: CPT | Performed by: INTERNAL MEDICINE

## 2024-11-27 PROCEDURE — 99214 OFFICE O/P EST MOD 30 MIN: CPT | Performed by: INTERNAL MEDICINE

## 2024-11-27 NOTE — PROGRESS NOTES
Progress Note - Cardiology Office  [facility]    Regulo Daly 69 y.o. male MRN: [id number]  : 1955  Encounter: [id number]      Assessment:     1. Permanent atrial fibrillation (HCC)    2. Hypertensive heart disease without heart failure    3. Aortic stenosis, moderate    4. History of stroke    5. Aneurysm of ascending aorta without rupture (HCC)    6. Obesity (BMI 30-39.9)    7. Dyslipidemia        Discussion Summary and Plan:    65-year-old male with history of probably chronic atrial fibrillation as last EKG in  also showing atrial fibrillation, obesity with BMI around 34, history of small ascending aortic aneurysm by CT scan, essential hypertension, cardiac murmur now diagnosed to have moderate aortic stenosis was seen after stroke.       1. History of CVA.  Patient had history of stroke last year.  Fortunately he has recovered most of his function.  He is now on antithrombotic therapy.  His EKG is consistent with chronic atrial fibrillation.  Continue Eliquis.            2. Permanent atrial fibrillation.  Patient has atrial fibrillation since . He was not taking blood thinners before the stroke he was on aspirin.  Heart rate is 68 bpm.  His heart rate is well-controlled with metoprolol 25 Q12 hourly and verapamil 120 mg daily.      3. Cardiac murmur.  Repeat echo Doppler in May 2022 shows he has moderate aortic stenosis mild-to-moderate AI.  LV function is normal will continue to monitor.  No change in symptoms.  We will continue to monitor he denies any new issues.  May need echo Doppler in       4. Essential hypertension.  Pressure is acceptable currently on metoprolol 25 twice a day verapamil 120 mg daily heart rate is 68 bpm.      5. Obesity with BMI around 33 encouraged him to lose weight      6.  Sending arctic aneurysm of 4.4 cm by CAT scan.  Sanchez CAT scan will be ordered      7. Dyslipidemia.  Continue statins.  Check liver enzyme and  cholesterol.    Continue other Rx as before EKG reviewed.  Some samples provided        Patient and patient's wife was advised and educated to call our office  immediately if  patient has any new symptoms of chest pain/shortness of breath, near-syncope, syncope, light headedness sustained palpitations  or any other cardiovascular symptoms before their scheduled follow-up appointment.  Office #[phone].  Please call [phone] if any questions.  Counseling :  A description of the counseling.  Goals and Barriers.  Patient's ability to self care: Yes  Medication side effect reviewed with patient in detail and all their questions answered to their satisfaction.    HPI :     Regulo Daly is a 69 y.o. year old male who came for follow up. Patient has been seen in [facility] in the past has medical history significant for probably permanent atrial fibrillation, obesity with BMI around 34, history of small ascending aortic aneurysm by CT scan, essential hypertension, cardiac murmur with moderate aortic stenosis who came for follow-up.  Patient was seen by us in January 2021 when he had a stroke.  He was noted to have M2/M3 branch occlusion at that time.  Nausea no vomiting no fever no chills.  He has quit drinking also he has been very active.  He came with his wife no issues EKG shows he is in atrial fibrillation heart rate 76 beats per minute.  No other cardiovascular problem.    His mother had heart problem and older age.    No recent surgeries.    1/27/2023.    Have reviewed.  Patient came for follow-up.  His medical history significant for monitor fibrillation, obesity with a BMI of 34, small ascending aortic aneurysm by CAT scan, essential hypertension, cardiac murmur with moderate aortic stenosis who came for follow-up.  In January 2021 he had a stroke.  He was noted to have M2/M3 branch occlusion.  He is now on Eliquis.  He is quit smoking.  No nausea no vomiting no fever no chills no  other issues.  His last echo was in May 2022.  No nausea no vomiting no fever no chills no PND no orthopnea no other cardiovascular issues EKG shows no change from previous EKG.  His vitals are stable.  His blood test done in July 2022 are acceptable cholesterol was 88    8/4/2023.    Above reviewed.  Patient came for follow-up he is doing well.  He has medical history significant for small ascending arctic aneurysm by CAT scan, permanent atrial fibrillation, essential hypertension, cardiac murmur with moderate aortic stenosis, obesity with a BMI around 33 as he lost some weight and history of chronic headaches is with now felt to be secondary to chronic allergies.  He also history of stroke with M2/M3 occlusion and has been on Eliquis.  He has quit smoking.  EK today shows atrial fibrillation heart rate 65 bpm which is not changed.  He did blood test on 7/18/2023 which shows his electrolyte and Cresta profile is acceptable.  Cholesterol is 88 with HDL 32 and LDL 40.  CT chest shows stable 44 mm ectasia of ascending aorta and he will need a repeat CAT scan in a year.  Denies any exertional shortness of breath or any change in symptoms.    11/27/2024    Above reviewed.  Patient came for follow-up he has a medical history significant for small ascending aortic aneurysm by CAT scan, pulm and atrial fibrillation, hypertensive heart disease, cardiac murmur with a moderate aortic stenosis, obesity with a BMI around 33 who came for follow-up.  He also history of stroke with M2 and M3 occlusion and has been on Eliquis.  He has quit smoking.  EKG today shows atrial fibrillation heart rate 68 bpm.  His blood test from July 2024 has been acceptable potassium was slightly low vitals are stable today.  His last CAT scan was in June 2023 and at that time his size of aneurysm was 44 mm.  In July he had a episode of pneumonia he was in the emergency room he was not very happy with the care in the emergency room at that  time.        Review of Systems   Constitutional:  Negative for activity change, chills, diaphoresis, fever and unexpected weight change.   HENT:  Negative for congestion.    Eyes:  Negative for discharge and redness.   Respiratory:  Negative for cough, chest tightness, shortness of breath and wheezing.    Cardiovascular: Negative.  Negative for chest pain, palpitations and leg swelling.   Gastrointestinal:  Negative for abdominal pain, diarrhea and nausea.   Endocrine: Negative.    Genitourinary:  Negative for decreased urine volume and urgency.   Musculoskeletal: Negative.  Negative for arthralgias, back pain and gait problem.   Skin:  Negative for rash and wound.   Allergic/Immunologic: Negative.    Neurological:  Negative for dizziness, seizures, syncope, weakness, light-headedness and headaches.   Hematological: Negative.    Psychiatric/Behavioral:  Negative for agitation and confusion. The patient is not nervous/anxious.        Historical Information   Past Medical History:   Diagnosis Date    Atrial fibrillation (HCC)     Hypertension     Paroxysmal atrial fibrillation (HCC)     Stroke (HCC)     Valvular heart disease      Past Surgical History:   Procedure Laterality Date    HERNIA REPAIR      KNEE SURGERY       Social History     Substance and Sexual Activity   Alcohol Use Never    Alcohol/week: 6.0 standard drinks of alcohol    Types: 6 Cans of beer per week     Social History     Substance and Sexual Activity   Drug Use Yes    Types: Marijuana    Comment: vape     Social History     Tobacco Use   Smoking Status Never   Smokeless Tobacco Never     Family History:   Family History   Problem Relation Age of Onset    No Known Problems Mother     Stroke Father     Heart failure Brother         heart problems    Brain cancer Brother                 Meds/Allergies     No Known Allergies    Current Outpatient Medications:     aspirin 81 mg chewable tablet, Chew 1 tablet (81 mg total) daily, Disp: 30  tablet, Rfl: 0    atorvastatin (LIPITOR) 40 mg tablet, TAKE 1 TABLET BY MOUTH EVERY DAY WITH DINNER, Disp: 90 tablet, Rfl: 1    Eliquis 5 MG, TAKE 1 TABLET BY MOUTH TWICE A DAY, Disp: 180 tablet, Rfl: 3    loratadine (CLARITIN) 10 mg tablet, Take 10 mg by mouth daily, Disp: , Rfl:     metoprolol tartrate (LOPRESSOR) 25 mg tablet, TAKE 1 TABLET (25 MG TOTAL) BY MOUTH EVERY 12 (TWELVE) HOURS, Disp: 180 tablet, Rfl: 3    verapamil (Verelan) 120 MG 24 hr capsule, TAKE 1 CAPSULE (120 MG TOTAL) BY MOUTH DAILY AT BEDTIME, Disp: 90 capsule, Rfl: 1    Vitals: Blood pressure 120/80, pulse 68, height 6' (1.829 m), weight 112 kg (246 lb), SpO2 98%.    Body mass index is 33.36 kg/m².  Wt Readings from Last 3 Encounters:   11/27/24 112 kg (246 lb)   07/01/24 112 kg (246 lb)   08/04/23 110 kg (243 lb)     Vitals:    11/27/24 1115   Weight: 112 kg (246 lb)     BP Readings from Last 3 Encounters:   11/27/24 120/80   07/01/24 169/93   08/04/23 130/80         Physical Exam  Constitutional:       General: He is not in acute distress.     Appearance: He is well-developed. He is not diaphoretic.   Neck:      Thyroid: No thyromegaly.      Vascular: No JVD.   Cardiovascular:      Rate and Rhythm: Normal rate. Rhythm irregularly irregular.      Heart sounds: S1 normal and S2 normal. Heart sounds not distant. Murmur heard.      Systolic murmur is present.      No friction rub. No gallop. No S3 or S4 sounds.      Comments: S1-S2 irregular with harsh ejection stock murmur but S2 is still distinctly heard  Pulmonary:      Effort: Pulmonary effort is normal. No respiratory distress.      Breath sounds: Normal breath sounds. No wheezing or rales.   Chest:      Chest wall: No tenderness.   Abdominal:      General: There is no distension.      Palpations: Abdomen is soft.      Tenderness: There is no abdominal tenderness.   Musculoskeletal:         General: No deformity.      Cervical back: Neck supple.   Lymphadenopathy:      Cervical: No cervical  adenopathy.   Skin:     General: Skin is warm and dry.      Coloration: Skin is not pale.      Findings: No rash.   Neurological:      Mental Status: He is alert and oriented to person, place, and time.   Psychiatric:         Judgment: Judgment normal.           Diagnostic Studies Review Cardio:    Echo Doppler done in May 2022 shows patient has normal LV systolic function EF 60%, mild AI moderate aortic stenosis, aortic valve area was thought to be 1.1 cm2 mean gradient 20 mmHg.  There was mild-to-moderate AI.  Small ascending aortic aneurysm 4 cm noted    Nuclear stress test.  Nuclear stress test done in 2021 shows EF 50 55%.  Micro perfusion was normal.    Holter monitor in May 2022, shows rhythm is atrial fibrillation throughout the studies.  Few PACs and PVCs noted average heart rate 68 beats per minute  EKG:  Twelve lead EKG 2022 shows atrial fibrillation heart rate 71 beats per minute.  No other significant abnormality.    Twelve-lead EKG 2023 shows atrial fibrillation heart rate 58 bpm.  No significant change from previous EKG    Twelve-lead EKG done on 2023 shows atrial fibrillation heart rate 65 bpm no change from previous EKG.    Twelve-lead EKG done on 2024 atrial fibrillation heart rate 68 bpm not changed from previous EKG.      Cardiac testing:   Results for orders placed during the hospital encounter of 21    Echo complete with contrast if indicated    Narrative  [address] [address]  [phone]    Transthoracic Echocardiogram  2D, M-mode, Doppler, and Color Doppler    Study date:  2021    Patient: ELISHA LEARY  MR number: [id number]  Account number: [id number]  : 1955  Age: 65 years  Gender: Male  Status: Outpatient  Location: Bedside  Height: 71 in  Weight: 285.3 lb  BP: 139/ 93 mmHg    Indications: TIA    Diagnoses: G45.9 - Transient cerebral ischemic attack, unspecified    Sonographer:   KELVIN Sarkar  Primary Physician:  Светлана Thomas MD  Referring Physician:  HAILEE Ibrahim  Group:  [facility]  Interpreting Physician:  Nicol Desai MD    SUMMARY    PROCEDURE INFORMATION:  This was a technically difficult study.    LEFT VENTRICLE:  Systolic function was normal. Ejection fraction was estimated in the range of 60 % to 65 % to be 65 %.  Although no diagnostic regional wall motion abnormality was identified, this possibility cannot be completely excluded on the basis of this study.  Wall thickness was moderately increased.  There was moderate concentric hypertrophy.    LEFT ATRIUM:  The atrium was mildly to moderately dilated.    ATRIAL SEPTUM:  No good quality bubble study was recorded    RIGHT ATRIUM:  The atrium was mildly to moderately dilated.    MITRAL VALVE:  There was mild to moderate annular calcification.  There was mild regurgitation.    AORTIC VALVE:  The valve was trileaflet. Leaflets exhibited moderately increased thickness, moderate calcification, and moderately reduced cuspal separation.  Transaortic velocity was increased due to valvular stenosis.  There was moderate stenosis. ROCÍO 1.1 cm2, mean gradient 20 mm of hg and peak around 40 mm of hg.  There was mild to moderate regurgitation.    TRICUSPID VALVE:  There was trace regurgitation.  The tricuspid jet envelope definition was inadequate for estimation of RV systolic pressure.    AORTA:  The root exhibited upper normal normal size. Size around 4 cm    HISTORY: PRIOR HISTORY: TIA,A.Fib.,HTN,Valvular heart disease.    PROCEDURE: The procedure was performed at the bedside. This was a routine study. The transthoracic approach was used. The study included complete 2D imaging, M-mode, complete spectral Doppler, and color Doppler. The heart rate was 107 bpm,  at the start of the study. Images were obtained from the parasternal, apical, subcostal, and suprasternal notch acoustic windows. Intravenous  contrast (agitated saline) was administered to evaluate possible R-L intracardiac shunting. This  was a technically difficult study.    LEFT VENTRICLE: Size was normal. Systolic function was normal. Ejection fraction was estimated in the range of 60 % to 65 % to be 65 %. Although no diagnostic regional wall motion abnormality was identified, this possibility cannot be  completely excluded on the basis of this study. Wall thickness was moderately increased. There was moderate concentric hypertrophy. DOPPLER: The study was not technically sufficient to allow evaluation of LV diastolic function, due to  atrial fibrillation.    RIGHT VENTRICLE: The size was normal. Systolic function was normal.    LEFT ATRIUM: The atrium was mildly to moderately dilated.    ATRIAL SEPTUM: No good quality bubble study was recorded    RIGHT ATRIUM: The atrium was mildly to moderately dilated.    MITRAL VALVE: There was mild to moderate annular calcification. There was mild calcification. There was normal leaflet separation. DOPPLER: The transmitral velocity was within the normal range. There was no evidence for stenosis. There was  mild regurgitation.    AORTIC VALVE: The valve was trileaflet. Leaflets exhibited moderately increased thickness, moderate calcification, and moderately reduced cuspal separation. DOPPLER: Transaortic velocity was increased due to valvular stenosis. There was  moderate stenosis. ROCÍO 1.1 cm2, mean gradient 20 mm of hg and peak around 40 mm of hg. There was mild to moderate regurgitation.    TRICUSPID VALVE: DOPPLER: There was trace regurgitation. The tricuspid jet envelope definition was inadequate for estimation of RV systolic pressure.    PULMONIC VALVE: DOPPLER: There was no significant regurgitation.    PERICARDIUM: There was no thickening or calcification. There was no pericardial effusion.    AORTA: The root exhibited upper normal normal size. Size around 4 cm    SYSTEMIC VEINS: IVC: The inferior vena cava  was not well visualized.    SYSTEM MEASUREMENT TABLES    2D  EF (Teich): 54.08 %  %FS: 27.98 %  Ao Diam: 3.84 cm  EDV(Teich): 106.84 ml  ESV(Teich): 49.06 ml  IVSd: 1.36 cm  LA Area: 21.24 cm2  LA Diam: 4.39 cm  LVIDd: 4.79 cm  LVIDs: 3.45 cm  LVOT Diam: 2.18 cm  LVPWd: 1.32 cm  RA Area: 27.21 cm2  RVIDd: 4.12 cm  SV (Teich): 57.78 ml    PW  E' Lat: 0.08 m/s  E' Sept: 0.13 m/s  LVOT Env.Ti: 308.28 ms  LVOT VTI: 17.64 cm  LVOT Vmax: 0.89 m/s  LVOT Vmean: 0.57 m/s  LVOT maxPG: 3.19 mmHg  LVOT meanP.51 mmHg    [facility]    Prepared and electronically signed by    Nicol Desai MD  Signed 2021 14:54:03      Results for orders placed during the hospital encounter of 21    NM Myocardial Perfusion Spect (Exercise Induced Stress and/or Rest)    [place] [phone]    Regadenoson    Patient: ELISHA LEARY  MR number: [id number]  Account number: [id number]  : 1955  Age: 65 years  Gender: Male  Status: Outpatient  Location: Stress lab  Height: 73 in  Weight: 286 lb  BP: 131/ 74 mmHg    Allergies: NO KNOWN ALLERGIES    Primary Physician:  Светлана Thomas MD  RN:  Elizabeth Chin, RN  Interpreting Physician:  Nicol Desai MD    INDICATIONS: Coronary artery disease.    HISTORY: The patient is a 65 year old  male. Chest pain status: no chest pain. Coronary artery disease risk factors: dyslipidemia and family history of premature coronary artery disease. Small, acute cortical infarct in right MCA  territory, chronic Afib Medications: a beta blocker, clopidogrel, and a lipid lowering agent.    REST ECG: Atrial fibrillation.    PROCEDURE: The study was performed in the the Stress lab. A regadenoson infusion pharmacologic stress test was performed. Systolic blood pressure was 131 mmHg, at the start of the study. Diastolic blood pressure was 74 mmHg, at the  start  of the study. The heart rate was 75 bpm, at the start of the study. IV double checked.  Regadenoson protocol:  Time HR bpm SBP mmHg DBP mmHg Symptoms Rhythm/conduct  Baseline 10:18 75 131 74 none A-fib  Immediate 13:20 -- -- -- -- --  1 min 10:19 101 128 70 mild dyspnea, flushing same as above, occasional PVC's  2 min 10:20 130 135 65 subsiding A-fib, occasional PVC's  3 min 10:21 109 121 60 subsiding A-fib, occasional PVC's  4 min 10:22 103 121 70 subsiding A-fib, occasional PVC's  5 min 10:23 101 119 66 subsiding same as above    STRESS SUMMARY: Duration of pharmacologic stress was 3 min and 0 sec. Maximal heart rate during stress was 131 bpm ( 83 % of maximal predicted heart rate). The rate-pressure product for the peak heart rate and blood pressure was 82150.  There was no chest pain during stress. The stress test was terminated due to protocol completion. The stress ECG was equivocal for ischemia. There were no stress arrhythmias or conduction abnormalities. Patient has chronic atrial  fibrillation.    ISOTOPE ADMINISTRATION:  Resting isotope administration Stress isotope administration  Agent Tetrofosmin Tetrofosmin  Dose 11 mCi 32.8 mCi  Injection time 08:25 10:19    The radiopharmaceutical was injected at the peak effect of pharmacologic stress.    MYOCARDIAL PERFUSION IMAGING:  The image quality was fair. Rotating projection images reveal mild diaphragmatic attenuation. Left ventricular size was normal. The TID ratio was 1.18. The right ventricle was dilated.    PERFUSION DEFECTS:  -  There was a small, mildly severe, fixed myocardial perfusion defect of the apical wall likely. As there appeared normalized on prone images    GATED SPECT:  The calculated left ventricular ejection fraction was 52 %. Left ventricular ejection fraction was within normal limits by visual estimate. There was no left ventricular regional abnormality.    SUMMARY:  -  Stress results: Target heart rate was not achieved. There  was no chest pain during stress.  -  ECG conclusions: The stress ECG was equivocal for ischemia.  -  Perfusion imaging: The right ventricle was dilated. There was a small, mildly severe, fixed myocardial perfusion defect of the apical wall likely. As there appeared normalized on prone images  -  Gated SPECT: The calculated left ventricular ejection fraction was 52 %. Left ventricular ejection fraction was within normal limits by visual estimate. There was no left ventricular regional abnormality.    IMPRESSIONS: Probably normal study after pharmacologic vasodilation without reproduction of symptoms. EF is 50-55% Myocardial perfusion imaging was normal at rest and with stress. Left ventricular systolic function was normal. Diagnostic  sensitivity was limited by submaximal stress.    Prepared and signed by    Nicol Desai MD  Signed 01/07/2021 16:03:47      Lab Review   Lab Results   Component Value Date    WBC 7.58 07/01/2024    HGB 15.3 07/01/2024    HCT 45.4 07/01/2024    MCV 86 07/01/2024    RDW 13.2 07/01/2024     07/01/2024     BMP:  Lab Results   Component Value Date    SODIUM 135 07/01/2024    K 3.4 (L) 07/01/2024     07/01/2024    CO2 26 07/01/2024    ANIONGAP 13.0 12/26/2015    BUN 13 07/01/2024    CREATININE 0.79 07/01/2024    GLUC 118 07/01/2024    CALCIUM 8.9 07/01/2024    CORRECTEDCA 8.9 01/05/2021    EGFR 92 07/01/2024    MG 2.1 01/06/2021     Troponins:    LFT:  Lab Results   Component Value Date    AST 23 07/01/2024    ALT 21 07/01/2024    ALKPHOS 70 07/01/2024    TP 7.5 07/01/2024    ALB 4.2 07/01/2024        Lab Results   Component Value Date    HGBA1C 5.4 01/06/2021     Lipid Profile:   Lab Results   Component Value Date    CHOLESTEROL 88 (L) 07/18/2022    HDL 32 (L) 07/18/2022    LDLCALC 41 07/18/2022    TRIG 69 07/18/2022     Lab Results   Component Value Date    CHOLESTEROL 88 (L) 07/18/2022    CHOLESTEROL 106 07/16/2021     Lab Results   Component Value Date    TROPONINI <0.02  "01/05/2021             Dr. Nicol Desai MD [facility]      \"This note has been constructed using a voice recognition system.Therefore there may be syntax, spelling, and/or grammatical errors. Please call if you have any questions. \"  "

## 2024-12-22 DIAGNOSIS — I63.9 ACUTE ISCHEMIC STROKE (HCC): ICD-10-CM

## 2024-12-24 RX ORDER — ATORVASTATIN CALCIUM 40 MG/1
40 TABLET, FILM COATED ORAL
Qty: 90 TABLET | Refills: 1 | Status: SHIPPED | OUTPATIENT
Start: 2024-12-24

## 2025-03-19 DIAGNOSIS — G43.109 MIGRAINE WITH AURA AND WITHOUT STATUS MIGRAINOSUS, NOT INTRACTABLE: ICD-10-CM

## 2025-03-19 RX ORDER — VERAPAMIL HYDROCHLORIDE 120 MG/1
120 CAPSULE, EXTENDED RELEASE ORAL
Qty: 90 CAPSULE | Refills: 1 | Status: SHIPPED | OUTPATIENT
Start: 2025-03-19

## 2025-03-21 ENCOUNTER — NURSE TRIAGE (OUTPATIENT)
Age: 70
End: 2025-03-21

## 2025-03-21 ENCOUNTER — HOSPITAL ENCOUNTER (EMERGENCY)
Facility: HOSPITAL | Age: 70
Discharge: HOME | End: 2025-03-21
Attending: EMERGENCY MEDICINE
Payer: MEDICARE

## 2025-03-21 ENCOUNTER — APPOINTMENT (EMERGENCY)
Dept: RADIOLOGY | Facility: HOSPITAL | Age: 70
End: 2025-03-21
Payer: MEDICARE

## 2025-03-21 VITALS
RESPIRATION RATE: 18 BRPM | OXYGEN SATURATION: 98 % | HEART RATE: 61 BPM | SYSTOLIC BLOOD PRESSURE: 153 MMHG | DIASTOLIC BLOOD PRESSURE: 84 MMHG | TEMPERATURE: 97.8 F

## 2025-03-21 DIAGNOSIS — R31.9 HEMATURIA: Primary | ICD-10-CM

## 2025-03-21 LAB
ALBUMIN SERPL BCG-MCNC: 4 G/DL (ref 3.5–5)
ALP SERPL-CCNC: 75 U/L (ref 34–104)
ALT SERPL W P-5'-P-CCNC: 16 U/L (ref 7–52)
ANION GAP SERPL CALCULATED.3IONS-SCNC: 8 MMOL/L (ref 4–13)
AST SERPL W P-5'-P-CCNC: 15 U/L (ref 13–39)
BACTERIA UR QL AUTO: ABNORMAL /HPF
BASOPHILS # BLD AUTO: 0.06 THOUSANDS/ÂΜL (ref 0–0.1)
BASOPHILS NFR BLD AUTO: 1 % (ref 0–1)
BILIRUB SERPL-MCNC: 0.83 MG/DL (ref 0.2–1)
BILIRUB UR QL STRIP: NEGATIVE
BUN SERPL-MCNC: 16 MG/DL (ref 5–25)
CALCIUM SERPL-MCNC: 9.1 MG/DL (ref 8.4–10.2)
CHLORIDE SERPL-SCNC: 107 MMOL/L (ref 96–108)
CLARITY UR: CLEAR
CO2 SERPL-SCNC: 22 MMOL/L (ref 21–32)
COLOR UR: ABNORMAL
CREAT SERPL-MCNC: 0.67 MG/DL (ref 0.6–1.3)
EOSINOPHIL # BLD AUTO: 0.2 THOUSAND/ÂΜL (ref 0–0.61)
EOSINOPHIL NFR BLD AUTO: 3 % (ref 0–6)
ERYTHROCYTE [DISTWIDTH] IN BLOOD BY AUTOMATED COUNT: 13.3 % (ref 11.6–15.1)
GFR SERPL CREATININE-BSD FRML MDRD: 98 ML/MIN/1.73SQ M
GLUCOSE SERPL-MCNC: 122 MG/DL (ref 65–140)
GLUCOSE UR STRIP-MCNC: NEGATIVE MG/DL
HCT VFR BLD AUTO: 44.3 % (ref 36.5–49.3)
HGB BLD-MCNC: 14.8 G/DL (ref 12–17)
HGB UR QL STRIP.AUTO: ABNORMAL
IMM GRANULOCYTES # BLD AUTO: 0.02 THOUSAND/UL (ref 0–0.2)
IMM GRANULOCYTES NFR BLD AUTO: 0 % (ref 0–2)
KETONES UR STRIP-MCNC: NEGATIVE MG/DL
LEUKOCYTE ESTERASE UR QL STRIP: NEGATIVE
LYMPHOCYTES # BLD AUTO: 2.51 THOUSANDS/ÂΜL (ref 0.6–4.47)
LYMPHOCYTES NFR BLD AUTO: 38 % (ref 14–44)
MCH RBC QN AUTO: 28.9 PG (ref 26.8–34.3)
MCHC RBC AUTO-ENTMCNC: 33.4 G/DL (ref 31.4–37.4)
MCV RBC AUTO: 87 FL (ref 82–98)
MONOCYTES # BLD AUTO: 0.49 THOUSAND/ÂΜL (ref 0.17–1.22)
MONOCYTES NFR BLD AUTO: 8 % (ref 4–12)
NEUTROPHILS # BLD AUTO: 3.28 THOUSANDS/ÂΜL (ref 1.85–7.62)
NEUTS SEG NFR BLD AUTO: 50 % (ref 43–75)
NITRITE UR QL STRIP: NEGATIVE
NON-SQ EPI CELLS URNS QL MICRO: ABNORMAL /HPF
NRBC BLD AUTO-RTO: 0 /100 WBCS
PH UR STRIP.AUTO: 6 [PH]
PLATELET # BLD AUTO: 145 THOUSANDS/UL (ref 149–390)
PMV BLD AUTO: 11.5 FL (ref 8.9–12.7)
POTASSIUM SERPL-SCNC: 3.6 MMOL/L (ref 3.5–5.3)
PROT SERPL-MCNC: 7.3 G/DL (ref 6.4–8.4)
PROT UR STRIP-MCNC: ABNORMAL MG/DL
RBC # BLD AUTO: 5.12 MILLION/UL (ref 3.88–5.62)
RBC #/AREA URNS AUTO: ABNORMAL /HPF
SODIUM SERPL-SCNC: 137 MMOL/L (ref 135–147)
SP GR UR STRIP.AUTO: <1.005 (ref 1–1.03)
UROBILINOGEN UR STRIP-ACNC: <2 MG/DL
WBC # BLD AUTO: 6.56 THOUSAND/UL (ref 4.31–10.16)
WBC #/AREA URNS AUTO: ABNORMAL /HPF

## 2025-03-21 PROCEDURE — 36415 COLL VENOUS BLD VENIPUNCTURE: CPT | Performed by: EMERGENCY MEDICINE

## 2025-03-21 PROCEDURE — 80053 COMPREHEN METABOLIC PANEL: CPT | Performed by: EMERGENCY MEDICINE

## 2025-03-21 PROCEDURE — 99284 EMERGENCY DEPT VISIT MOD MDM: CPT

## 2025-03-21 PROCEDURE — 85025 COMPLETE CBC W/AUTO DIFF WBC: CPT | Performed by: EMERGENCY MEDICINE

## 2025-03-21 PROCEDURE — 81001 URINALYSIS AUTO W/SCOPE: CPT | Performed by: EMERGENCY MEDICINE

## 2025-03-21 PROCEDURE — 99285 EMERGENCY DEPT VISIT HI MDM: CPT | Performed by: EMERGENCY MEDICINE

## 2025-03-21 PROCEDURE — 74178 CT ABD&PLV WO CNTR FLWD CNTR: CPT

## 2025-03-21 RX ADMIN — IOHEXOL 100 ML: 350 INJECTION, SOLUTION INTRAVENOUS at 07:16

## 2025-03-21 NOTE — TELEPHONE ENCOUNTER
"FOLLOW UP: Received call from pt's wife stating the pt presented to the ER this morning 3/21/25 with hematuria.  While in the ED the pt passed a blood clot but by the time he was discharged his urine color had returned to normal.  Pt was told by the ER MD he could resume his Eliquis and to follow up with PCP and Urology.  Pt took morning dose of Eliquis and is now noticing blood leaking form the tip of his penis.      REASON FOR CONVERSATION: Blood in Urine    SYMPTOMS: denies any painful urination or flank pain    OTHER: Please advise if pt should hold Eliquis.    Labs done in ER: CBC, CMP, UA    Advised pt's wife to follow up with PCP and Urology for ASAP appointments.      DISPOSITION: Discuss with Provider and Call Back Patient      Answer Assessment - Initial Assessment Questions  1. COLOR of URINE: \"Describe the color of the urine.\"  (e.g., tea-colored, pink, red, bloody) \"Do you have blood clots in your urine?\" (e.g., none, pea, grape, small coin)      Pink - clots passed in ED  2. ONSET: \"When did the bleeding start?\"       This morning   3. EPISODES: \"How many times has there been blood in the urine?\" or \"How many times today?\"      Blood in his urine this morning, look more clear now but blood is leaking from the tip of his penis   4. PAIN with URINATION: \"Is there any pain with passing your urine?\" If Yes, ask: \"How bad is the pain?\"  (Scale 1-10; or mild, moderate, severe)      Had some pain this morning which is why he went to the ER but no pain now   5. FEVER: \"Do you have a fever?\" If Yes, ask: \"What is your temperature, how was it measured, and when did it start?\"      Afebrile in ER   6. ASSOCIATED SYMPTOMS: \"Are you passing urine more frequently than usual?\"      Denies   7. OTHER SYMPTOMS: \"Do you have any other symptoms?\" (e.g., back/flank pain, abdomen pain, vomiting)      Denies    Protocols used: Urine - Blood In-Adult-OH    "

## 2025-03-21 NOTE — TELEPHONE ENCOUNTER
I spoke with patient, advised of recommendations.  He stated that he was unable to get an appt with urology until April.  Can you please advise if this is sufficient.

## 2025-03-21 NOTE — TELEPHONE ENCOUNTER
That is unfortunate.  If his hematuria does not get better and or reoccurs he should go to the emergency room.

## 2025-03-21 NOTE — DISCHARGE INSTRUCTIONS
Schedule an appointment with urology -- you will need to have a cystoscopy (small camera) to see your bladder and evaluate the cause of your bleeding.     Follow up with your primary doctor. You have a small aortic aneurysm that will need to be followed over time to ensure it is not getting larger, this is usually done with either ultrasound or CT imaging and can be re-evaluated in 6-12 months.     If you develop new or worsening symptoms, please return to the Emergency Department for further evaluation.

## 2025-03-23 NOTE — ED PROVIDER NOTES
Final Diagnosis:  1. Hematuria        Chief Complaint   Patient presents with    Blood in Urine     Patient reports blood in urine this am, felt like there was a blockage and some pain at tip of penis       HPI  Pt pres w/ hematuria    Small clots  Felt a blockage so came here    However here urine clearing spont  Bladder scan ok  Minimal burning  No flank pain  No abd pain  No n/v  No fever chills    Never had prev  On OAC and antiplate    EMS additionally reports:     - Previous charting underwent limited review with attention to last ED visits, labs, ekgs, and prior imaging.  Chart review reveals :     Admission on 07/01/2024, Discharged on 07/01/2024   Component Date Value Ref Range Status    WBC 07/01/2024 7.58  4.31 - 10.16 Thousand/uL Final    RBC 07/01/2024 5.30  3.88 - 5.62 Million/uL Final    Hemoglobin 07/01/2024 15.3  12.0 - 17.0 g/dL Final    Hematocrit 07/01/2024 45.4  36.5 - 49.3 % Final    MCV 07/01/2024 86  82 - 98 fL Final    MCH 07/01/2024 28.9  26.8 - 34.3 pg Final    MCHC 07/01/2024 33.7  31.4 - 37.4 g/dL Final    RDW 07/01/2024 13.2  11.6 - 15.1 % Final    MPV 07/01/2024 10.8  8.9 - 12.7 fL Final    Platelets 07/01/2024 153  149 - 390 Thousands/uL Final    nRBC 07/01/2024 0  /100 WBCs Final    Segmented % 07/01/2024 76 (H)  43 - 75 % Final    Immature Grans % 07/01/2024 0  0 - 2 % Final    Lymphocytes % 07/01/2024 14  14 - 44 % Final    Monocytes % 07/01/2024 10  4 - 12 % Final    Eosinophils Relative 07/01/2024 0  0 - 6 % Final    Basophils Relative 07/01/2024 0  0 - 1 % Final    Absolute Neutrophils 07/01/2024 5.72  1.85 - 7.62 Thousands/µL Final    Absolute Immature Grans 07/01/2024 0.02  0.00 - 0.20 Thousand/uL Final    Absolute Lymphocytes 07/01/2024 1.03  0.60 - 4.47 Thousands/µL Final    Absolute Monocytes 07/01/2024 0.77  0.17 - 1.22 Thousand/µL Final    Eosinophils Absolute 07/01/2024 0.01  0.00 - 0.61 Thousand/µL Final    Basophils Absolute 07/01/2024 0.03  0.00 - 0.10 Thousands/µL  Final    Sodium 07/01/2024 135  135 - 147 mmol/L Final    Potassium 07/01/2024 3.4 (L)  3.5 - 5.3 mmol/L Final    Chloride 07/01/2024 102  96 - 108 mmol/L Final    CO2 07/01/2024 26  21 - 32 mmol/L Final    ANION GAP 07/01/2024 7  4 - 13 mmol/L Final    BUN 07/01/2024 13  5 - 25 mg/dL Final    Creatinine 07/01/2024 0.79  0.60 - 1.30 mg/dL Final    Standardized to IDMS reference method    Glucose 07/01/2024 118  65 - 140 mg/dL Final    If the patient is fasting, the ADA then defines impaired fasting glucose as > 100 mg/dL and diabetes as > or equal to 123 mg/dL.    Calcium 07/01/2024 8.9  8.4 - 10.2 mg/dL Final    AST 07/01/2024 23  13 - 39 U/L Final    ALT 07/01/2024 21  7 - 52 U/L Final    Specimen collection should occur prior to Sulfasalazine administration due to the potential for falsely depressed results.     Alkaline Phosphatase 07/01/2024 70  34 - 104 U/L Final    Total Protein 07/01/2024 7.5  6.4 - 8.4 g/dL Final    Albumin 07/01/2024 4.2  3.5 - 5.0 g/dL Final    Total Bilirubin 07/01/2024 1.32 (H)  0.20 - 1.00 mg/dL Final    Use of this assay is not recommended for patients undergoing treatment with eltrombopag due to the potential for falsely elevated results.  N-acetyl-p-benzoquinone imine (metabolite of Acetaminophen) will generate erroneously low results in samples for patients that have taken an overdose of Acetaminophen.    eGFR 07/01/2024 92  ml/min/1.73sq m Final    SARS-CoV-2 07/01/2024 Negative  Negative Final    INFLUENZA A PCR 07/01/2024 Negative  Negative Final    INFLUENZA B PCR 07/01/2024 Negative  Negative Final    RSV PCR 07/01/2024 Negative  Negative Final    Blood Culture 07/01/2024 No Growth After 5 Days.   Final    Blood Culture 07/01/2024 No Growth After 5 Days.   Final    LACTIC ACID 07/01/2024 0.8  0.5 - 2.0 mmol/L Final    Procalcitonin 07/01/2024 0.14  <=0.25 ng/ml Final    Comment: Suspected Lower Respiratory Tract Infection (LRTI):  - LESS than or EQUAL to 0.25 ng/mL:   low  likelihood for bacterial LRTI; antibiotics DISCOURAGED.  - GREATER than 0.25 ng/mL:   increased likelihood for bacterial LRTI; antibiotics ENCOURAGED.    Suspected Sepsis:  - Strongly consider initiating antibiotics in ALL UNSTABLE patients.  - LESS than or EQUAL to 0.5 ng/mL:   low likelihood for bacterial sepsis; antibiotics DISCOURAGED.  - GREATER than 0.5 ng/mL:   increased likelihood for bacterial sepsis; antibiotics ENCOURAGED.  - GREATER than 2 ng/mL:   high risk for severe sepsis / septic shock; antibiotics strongly ENCOURAGED.    Decisions on antibiotic use should not be based solely on Procalcitonin (PCT) levels. If PCT is low but uncertainty exists with stopping antibiotics, repeat PCT in 6-24 hours to confirm the low level. If antibiotics are administered (regardless if initial PCT was high or low), repeat PCT every 1-2 days to consider early antibiotic cessation (when GREATER                            than 80% decrease from the peak OR when PCT drops below designated cutoffs, whichever comes first), so long as the infection is NOT one that typically requires prolonged treatment durations (e.g., bone/joint infections, endocarditis, Staph. aureus bacteremia).    Situations of FALSE-POSITIVE Procalcitonin values:  1) Newborns < 72 hours old  2) Massive stress from severe trauma / burns, major surgery, acute pancreatitis, cardiogenic / hemorrhagic shock, sickle cell crisis, or other organ perfusion abnormalities  3) Malaria and some Candidal infections  4) Treatment with agents that stimulate cytokines (e.g., OKT3, anti-lymphocyte globulins, alemtuzumab, IL-2, granulocyte transfusion [NOT GCSFs])  5) Chronic renal disease causes elevated baseline levels (consider GREATER than 0.75 ng/mL as an abnormal cut-off); initiating HD/CRRT may cause transient decreases  6) Paraneoplastic syndromes from medullary thyroid or SCLC, some forms of vasculitis, and acute [id number]                             "disease    Situations of FALSE-NEGATIVE Procalcitonin values:  1) Too early in clinical course for PCT to have reached its peak (may repeat in 6-24 hours to confirm low level)  2) Localized infection WITHOUT systemic (SIRS / sepsis) response (e.g., an abscess, osteomyelitis, cystitis)  3) Mycobacteria (e.g., Tuberculosis, MAC)  4) Cystic fibrosis exacerbations      hs TnI 0hr 07/01/2024 11  \"Refer to ACS Flowchart\"- see link ng/L Final    Comment:                                              Initial (time 0) result  If >=50 ng/L, Myocardial injury suggested ;  Type of myocardial injury and treatment strategy  to be determined.  If 5-49 ng/L, a delta result at 2 hours and or 4 hours will be needed to further evaluate.  If <4 ng/L, and chest pain has been >3 hours since onset, patient may qualify for discharge based on the HEART score in the ED.  If <5 ng/L and <3hours since onset of chest pain, a delta result at 2 hours will be needed to further evaluate.    HS Troponin 99th Percentile URL of a Health Population=12 ng/L with a 95% Confidence Interval of 8-18 ng/L.    Second Troponin (time 2 hours)  If calculated delta >= 20 ng/L,  Myocardial injury suggested ; Type of myocardial injury and treatment strategy to be determined.  If 5-49 ng/L and the calculated delta is 5-19 ng/L, consult medical service for evaluation.  Continue evaluation for ischemia on ecg and other possible etiology and repeat hs troponin at 4 hours.  If delta                            is <5 ng/L at 2 hours, consider discharge based on risk stratification via the HEART score (if in ED), or JOSY risk score in IP/Observation.    HS Troponin 99th Percentile URL of a Health Population=12 ng/L with a 95% Confidence Interval of 8-18 ng/L.    Color, UA 07/01/2024 Colorless   Final    Clarity, UA 07/01/2024 Clear   Final    Specific Gravity, UA 07/01/2024 <1.005 (L)  1.005 - 1.030 Final    pH, UA 07/01/2024 6.0  4.5, 5.0, 5.5, 6.0, 6.5, 7.0, 7.5, 8.0 " Final    Leukocytes, UA 07/01/2024 Negative  Negative Final    Nitrite, UA 07/01/2024 Negative  Negative Final    Protein, UA 07/01/2024 Negative  Negative mg/dl Final    Glucose, UA 07/01/2024 Negative  Negative mg/dl Final    Ketones, UA 07/01/2024 Negative  Negative mg/dl Final    Urobilinogen, UA 07/01/2024 <2.0  <2.0 mg/dl mg/dl Final    Bilirubin, UA 07/01/2024 Negative  Negative Final    Occult Blood, UA 07/01/2024 Moderate (A)  Negative Final    RBC, UA 07/01/2024 2-4  None Seen, 0-1, 1-2, 2-4, 0-5 /hpf Final    WBC, UA 07/01/2024 None Seen  None Seen, 0-1, 1-2, 0-5, 2-4 /hpf Final    Epithelial Cells 07/01/2024 None Seen  None Seen, Occasional /hpf Final    Bacteria, UA 07/01/2024 None Seen  None Seen, Occasional /hpf Final    Ventricular Rate 07/01/2024 112  BPM Final    QRSD Interval 07/01/2024 106  ms Final    QT Interval 07/01/2024 362  ms Final    QTC Interval 07/01/2024 494  ms Final    QRS Axis 07/01/2024 -35  degrees Final    T Wave Axis 07/01/2024 31  degrees Final       - No language barrier.   - History obtained from patient    - Discuss patient's care, with patient permission or by chart review, with his sig other.      PMH:   has a past medical history of Atrial fibrillation (HCC), Hypertension, Paroxysmal atrial fibrillation (HCC), Stroke (HCC), and Valvular heart disease.    PSH:   has a past surgical history that includes Knee surgery and Hernia repair.     Social History:  Tobacco Use: Low Risk  (3/21/2025)    Patient History     Smoking Tobacco Use: Never     Smokeless Tobacco Use: Never     Passive Exposure: Not on file     Alcohol Use: Alcohol Misuse (6/9/2021)    AUDIT-C     Frequency of Alcohol Consumption: 2-3 times a week     Average Number of Drinks: 1 or 2     Frequency of Binge Drinking: Monthly     No illicit use       ROS:  Pertinent positives/negatives: .     Some ROS may be present in the HPI and would take precedent over these standard questions asked below.   Review of  Systems   Genitourinary:  Positive for difficulty urinating and hematuria.        CONSTITUTIONAL:  No lethargy. No unexpected weight loss. No change in behavior.  EYES:  No pain, redness, or discharge. No loss of vision. No orbital trauma or pain.   ENT:  No tinnitus or decreased hearing. No epistaxis/purulent rhinorrhea. No voice change, airway closing, trismus.   CARDIOVASCULAR:  No chest pain. No skin mottling or pallor. No change in exertional capacity  RESPIRATORY:  No hemoptysis. No paroxysmal nocturnal dyspnea. No stridor. No apnea or bluing.   GASTROINTESTINAL:  No vomiting, diarrhea. No distension. No melena. No hematochezia.   GENITOURINARY:  No nocturia. No hematuria or foul smelling or cloudy urine. No discharge. No sores/adenopathy.   MUSCULOSKELETAL:  No contracture.  No new deformity.   INTEGUMENTARY:  No swelling. No unexpected contusions. No abrasions. No lymphangitis.  NEUROLOGIC:  No meningismus. No new numbness of the extremities. No new focal weakness. No postural instability  PSYCHIATRIC:  No SI HI AVH  HEMATOLOGICAL:  No bleeding. No petechiae. No bruising.  ALLERGIES:  No urticaria. No sudden abd cramping. No stridor.    PE:     Physical exam highlights:   Physical Exam       Vitals:    03/21/25 0621 03/21/25 0800   BP: 168/90 153/84   BP Location: Right arm    Pulse: 80 61   Resp: 18 18   Temp: 98.4 °F (36.9 °C) 97.8 °F (36.6 °C)   TempSrc: Oral Oral   SpO2: 97% 98%     Vitals reviewed by me.   Nursing note reviewed  Chaperone present for all sensitive exam.  Const: No acute distress. Alert. Nontoxic. Not diaphoretic.    HEENT: External ears normal. No protrusion drainage swelling. Nose normal. No drainage/traumatic deformity. MM. Mouth with baseline/symmetric movement. No trismus.   Eyes: No squinting. No icterus. No tearing/swelling/drainage. Tracks through the room with normal EOM.   Neck: ROM normal. No rigidity. No meningismus.  Cards: Rate as per vitals Compared to monitor sinus unless  documented. Regular Well perfused.  Pulm: Effort and excursion normal. No distress. No audible wheezing/no stridor. Normal resp rate without retraction or change in work of breathing.  Abd: No distension beyond baseline. No fluctuant wave. Patient without peritoneal pain with shifting/bumping the bed. Soft no cva tend  MSK: ROM normal baseline. No deformity. No contractures from baseline.   Skin: No new rashes visible. Well perfused. No wounds visualized on exposed skin  Neuro: Nonfocal. Baseline. CN grossly intact. Moving all four with coordination.   Psych: Normal behavior and affect.        A:  - Nursing note reviewed.    Ddx and MDM  Considered diagnoses      R/o UTI hemorrhagic cystitis  None  F/u culture    R/o retention  None on bladder scan  Urine clearing here    R/o RCC  R/o bladder ca  Renal prot CT  AM team f/u    Check renal fxn      Dispo decision   No indic admit, no retention CBI etc  Will need f/u uro    My conversation with consultant reveals:        Decision rules:                    ED Course as of 03/23/25 0428   Fri Mar 21, 2025   0713 F/u CT r/o RCC/Bladder Ca  Ok for home  Not retaining, 11 cc on PVR         My read of the XR/CT scan reveals:     CT renal protocol   Final Result      1.  Nonobstructing 6 mm right renal calculus.   2.  There are small calcifications within a tiny urachal remnant at the bladder dome. While there is no convincing associated mass, this portion of the bladder is suboptimally opacified. Further evaluation by cystoscopy should be considered.   3.  Fusiform 3.7 cm infrarenal abdominal aortic aneurysm. This previously measured 2.5 cm in 2011.      4.  Please refer to the report body for description of other incidental, chronic and/or benign findings.                  Workstation performed: [id number]             Orders Placed This Encounter   Procedures    CT renal protocol    UA (URINE) with reflex to Scope    CBC and differential    Comprehensive metabolic panel     Urine Microscopic    Ambulatory Referral to Urology     Labs Reviewed   URINALYSIS WITH REFLEX TO SCOPE - Abnormal       Result Value Ref Range Status    Color, UA Light Yellow   Final    Clarity, UA Clear   Final    Specific Gravity, UA <1.005 (*) 1.005 - 1.030 Final    pH, UA 6.0  4.5, 5.0, 5.5, 6.0, 6.5, 7.0, 7.5, 8.0 Final    Leukocytes, UA Negative  Negative Final    Nitrite, UA Negative  Negative Final    Protein, UA Trace (*) Negative mg/dl Final    Glucose, UA Negative  Negative mg/dl Final    Ketones, UA Negative  Negative mg/dl Final    Urobilinogen, UA <2.0  <2.0 mg/dl mg/dl Final    Bilirubin, UA Negative  Negative Final    Occult Blood, UA Large (*) Negative Final   CBC AND DIFFERENTIAL - Abnormal    WBC 6.56  4.31 - 10.16 Thousand/uL Final    RBC 5.12  3.88 - 5.62 Million/uL Final    Hemoglobin 14.8  12.0 - 17.0 g/dL Final    Hematocrit 44.3  36.5 - 49.3 % Final    MCV 87  82 - 98 fL Final    MCH 28.9  26.8 - 34.3 pg Final    MCHC 33.4  31.4 - 37.4 g/dL Final    RDW 13.3  11.6 - 15.1 % Final    MPV 11.5  8.9 - 12.7 fL Final    Platelets 145 (*) 149 - 390 Thousands/uL Final    nRBC 0  /100 WBCs Final    Segmented % 50  43 - 75 % Final    Immature Grans % 0  0 - 2 % Final    Lymphocytes % 38  14 - 44 % Final    Monocytes % 8  4 - 12 % Final    Eosinophils Relative 3  0 - 6 % Final    Basophils Relative 1  0 - 1 % Final    Absolute Neutrophils 3.28  1.85 - 7.62 Thousands/µL Final    Absolute Immature Grans 0.02  0.00 - 0.20 Thousand/uL Final    Absolute Lymphocytes 2.51  0.60 - 4.47 Thousands/µL Final    Absolute Monocytes 0.49  0.17 - 1.22 Thousand/µL Final    Eosinophils Absolute 0.20  0.00 - 0.61 Thousand/µL Final    Basophils Absolute 0.06  0.00 - 0.10 Thousands/µL Final   URINE MICROSCOPIC - Abnormal    RBC, UA 30-50 (*) None Seen, 0-1, 1-2, 2-4, 0-5 /hpf Final    WBC, UA None Seen  None Seen, 0-1, 1-2, 0-5, 2-4 /hpf Final    Epithelial Cells None Seen  None Seen, Occasional /hpf Final    Bacteria,  UA None Seen  None Seen, Occasional /hpf Final   COMPREHENSIVE METABOLIC PANEL    Sodium 137  135 - 147 mmol/L Final    Potassium 3.6  3.5 - 5.3 mmol/L Final    Chloride 107  96 - 108 mmol/L Final    CO2 22  21 - 32 mmol/L Final    ANION GAP 8  4 - 13 mmol/L Final    BUN 16  5 - 25 mg/dL Final    Creatinine 0.67  0.60 - 1.30 mg/dL Final    Comment: Standardized to IDMS reference method    Glucose 122  65 - 140 mg/dL Final    Comment: If the patient is fasting, the ADA then defines impaired fasting glucose as > 100 mg/dL and diabetes as > or equal to 123 mg/dL.    Calcium 9.1  8.4 - 10.2 mg/dL Final    AST 15  13 - 39 U/L Final    ALT 16  7 - 52 U/L Final    Comment: Specimen collection should occur prior to Sulfasalazine administration due to the potential for falsely depressed results.     Alkaline Phosphatase 75  34 - 104 U/L Final    Total Protein 7.3  6.4 - 8.4 g/dL Final    Albumin 4.0  3.5 - 5.0 g/dL Final    Total Bilirubin 0.83  0.20 - 1.00 mg/dL Final    Comment: Use of this assay is not recommended for patients undergoing treatment with eltrombopag due to the potential for falsely elevated results.  N-acetyl-p-benzoquinone imine (metabolite of Acetaminophen) will generate erroneously low results in samples for patients that have taken an overdose of Acetaminophen.    eGFR 98  ml/min/1.73sq m Final    Narrative:     National Kidney Disease Foundation guidelines for Chronic Kidney Disease (CKD):     Stage 1 with normal or high GFR (GFR > 90 mL/min/1.73 square meters)    Stage 2 Mild CKD (GFR = 60-89 mL/min/1.73 square meters)    Stage 3A Moderate CKD (GFR = 45-59 mL/min/1.73 square meters)    Stage 3B Moderate CKD (GFR = 30-44 mL/min/1.73 square meters)    Stage 4 Severe CKD (GFR = 15-29 mL/min/1.73 square meters)    Stage 5 End Stage CKD (GFR <15 mL/min/1.73 square meters)  Note: GFR calculation is accurate only with a steady state creatinine       *Each of these labs was reviewed. Particular standout labs  will be noted in the ED Course above     Final Diagnosis:  1. Hematuria          P:  - hospital tx includes   Medications   iohexol (OMNIPAQUE) 350 MG/ML injection (MULTI-DOSE) 100 mL (100 mL Intravenous Given 3/21/25 0716)         - disposition  Time reflects when diagnosis was documented in both MDM as applicable and the Disposition within this note       Time User Action Codes Description Comment    3/21/2025  7:48 AM JaretJeffrey Add [R31.9] Hematuria           ED Disposition       ED Disposition   Discharge    Condition   Stable    Date/Time   Fri Mar 21, 2025  7:48 AM    Comment   Regulo Daly discharge to home/self care.                   Follow-up Information       Follow up With Specialties Details Why Contact Info    Marcus Lay MD Urology Schedule an appointment as soon as possible for a visit   [address] [facility] [address]  [phone]      Светлана Thomas MD  Schedule an appointment as soon as possible for a visit   [address] [address]  [phone]              - patient will call their PCP to let them know they were in the emergency department. We discuss return precautions and patient is agreeable with plan and aformentioned disposition.       - additional treatment intended, if consistent with primary provider:  - patient to follow with :      Discharge Medication List as of 3/21/2025  7:51 AM        CONTINUE these medications which have NOT CHANGED    Details   apixaban (Eliquis) 5 mg Take 1 tablet (5 mg total) by mouth 2 (two) times a day, Starting Wed 11/27/2024, Normal      aspirin 81 mg chewable tablet Chew 1 tablet (81 mg total) daily, Starting Fri 1/8/2021, Normal      atorvastatin (LIPITOR) 40 mg tablet TAKE 1 TABLET BY MOUTH EVERY DAY WITH DINNER, Starting Tue 12/24/2024, Normal      loratadine (CLARITIN) 10 mg tablet Take 10 mg by mouth daily, Historical Med      metoprolol tartrate (LOPRESSOR) 25 mg tablet TAKE 1 TABLET (25 MG TOTAL) BY MOUTH EVERY 12  "(TWELVE) HOURS, Starting Mon 9/23/2024, Normal      verapamil (Verelan) 120 MG 24 hr capsule TAKE 1 CAPSULE (120 MG TOTAL) BY MOUTH DAILY AT BEDTIME, Starting Wed 3/19/2025, Normal             Prior to Admission Medications   Prescriptions Last Dose Informant Patient Reported? Taking?   apixaban (Eliquis) 5 mg   No No   Sig: Take 1 tablet (5 mg total) by mouth 2 (two) times a day   apixaban (Eliquis) 5 mg   No No   Sig: Take 1 tablet (5 mg total) by mouth 2 (two) times a day for 28 days   aspirin 81 mg chewable tablet  Self No No   Sig: Chew 1 tablet (81 mg total) daily   atorvastatin (LIPITOR) 40 mg tablet   No No   Sig: TAKE 1 TABLET BY MOUTH EVERY DAY WITH DINNER   loratadine (CLARITIN) 10 mg tablet  Self Yes No   Sig: Take 10 mg by mouth daily   metoprolol tartrate (LOPRESSOR) 25 mg tablet   No No   Sig: TAKE 1 TABLET (25 MG TOTAL) BY MOUTH EVERY 12 (TWELVE) HOURS   verapamil (Verelan) 120 MG 24 hr capsule   No No   Sig: TAKE 1 CAPSULE (120 MG TOTAL) BY MOUTH DAILY AT BEDTIME      Facility-Administered Medications: None       Portions of the record may have been created with voice recognition software. Occasional wrong word or \"sound a like\" substitutions may have occurred due to the inherent limitations of voice recognition software. Read the chart carefully and recognize, using context, where substitutions have occurred.    Electronically signed by:  MD Larry Pizarro MD  03/23/25 0432    "

## 2025-03-24 ENCOUNTER — TELEPHONE (OUTPATIENT)
Age: 70
End: 2025-03-24

## 2025-03-24 NOTE — TELEPHONE ENCOUNTER
Caller: Tip     Doctor: Dr. Desai    Reason for call: Patient calling back to follow up on response and would like to know what to do with Eliquis tonight.     Call back#: [phone]

## 2025-03-24 NOTE — TELEPHONE ENCOUNTER
Patient calling back, he was seen in ED on 3/21/25 for hematuria. Patient states he stopped Eliquis, but increased Aspirin 81 mg to BID on his own. Urine has cleared over the weekend, but he did pass 1 small blood clot this morning when urinating. Urine is otherwise yellow, and he has voided since without clots. Patient will make appointment to follow up with Urology.  Would like recommendations on how to proceed with Eliquis until seen by Urology.     Please review and advise.

## 2025-03-24 NOTE — TELEPHONE ENCOUNTER
New Patient    Appointment Scheduling  What office location does the patient prefer?: [place]   What is the reason for the patient's appointment?: hematuria   Have patient records been requested?:  If No, are the records showing in Epic:     Appointment Details  Date: 04/07/25  Time:  1:20 pm     Location: [place]     Provider:  Letty   Does the appointment need further review? (Reason)   Pt is currently off of his Eliquis and awaiting clearance from urology to be able to continue taking it. Please review if sooner appt would be available      HISTORY  Is the patient having active symptoms? If so, describe symptoms: visible blood in urine   Has the patient had any previous Urologist(s)?: no   Was the patient seen in the ED?: yes 03/21  Has the patient had any outside testing done?:   Does patient have Imaging/Lab Results: U/S and CT scan   Have you had Cancer     INSURANCE   Have you confirmed Patient's insurance? yes  Is the insurance accepted?  yes  Is the insurance active? yes

## 2025-03-25 NOTE — TELEPHONE ENCOUNTER
I spoke with patient, made aware that his recommended dose is 5mg. Patient is willing to challenge the 5mg BID.   He was encouraged to reach out to us with any further questions or concerns.   He verbalized understanding.

## 2025-03-25 NOTE — TELEPHONE ENCOUNTER
There is always risk and benefit with Eliquis.  Of course Eliquis is given to him for stroke prevention due to his underlying atrial fibrillation.  But if he has recurrent hematuria he may have to stop it.  For now if he want to rechallenge himself he can go back on Eliquis stop aspirin and see how he does.  If he bleeds again obviously he would need to hold Eliquis until we find out why he is bleeding.  There is no other good solution.

## 2025-03-25 NOTE — TELEPHONE ENCOUNTER
Patient called back, information provided from your message.     He is willing to start eliquis at 5mg tonight, but he is wondering if he would still get some benefit from the 2.5mg tablet and not have such a risk of bleeding?

## 2025-03-31 NOTE — TELEPHONE ENCOUNTER
Called and spoke to pt as there is an available appt today or tomorrow. Pt told me he is not available for a sooner appt. Confirmed for 4/7

## 2025-04-06 PROBLEM — I35.0 MODERATE AORTIC STENOSIS: Status: ACTIVE | Noted: 2025-04-06

## 2025-04-07 ENCOUNTER — TELEPHONE (OUTPATIENT)
Dept: UROLOGY | Facility: CLINIC | Age: 70
End: 2025-04-07

## 2025-04-07 ENCOUNTER — OFFICE VISIT (OUTPATIENT)
Dept: UROLOGY | Facility: CLINIC | Age: 70
End: 2025-04-07
Payer: MEDICARE

## 2025-04-07 VITALS
SYSTOLIC BLOOD PRESSURE: 132 MMHG | OXYGEN SATURATION: 98 % | HEIGHT: 72 IN | WEIGHT: 233 LBS | HEART RATE: 61 BPM | DIASTOLIC BLOOD PRESSURE: 80 MMHG | BODY MASS INDEX: 31.56 KG/M2

## 2025-04-07 DIAGNOSIS — R31.9 HEMATURIA: ICD-10-CM

## 2025-04-07 DIAGNOSIS — R31.0 GROSS HEMATURIA: Primary | ICD-10-CM

## 2025-04-07 LAB — POST-VOID RESIDUAL VOLUME, ML POC: 35 ML

## 2025-04-07 PROCEDURE — 51798 US URINE CAPACITY MEASURE: CPT

## 2025-04-07 PROCEDURE — 99203 OFFICE O/P NEW LOW 30 MIN: CPT

## 2025-04-07 NOTE — TELEPHONE ENCOUNTER
Asad Rogers,    Good day,    Please kindly assist,    Patient needs to be scheduled for a Cysto but no availability. Our Providers are far out.    Just FYI:  Incase you need assistance:  Visit Type:  Uro Simple Cysyto  Provider:  Any MD        See Letty Notes:  Return for schedule cysto with MD within 2 months and print lab slip for urine cytology now .

## 2025-04-07 NOTE — PROGRESS NOTES
Name: Regulo Daly      : 1955      MRN: [id number]  Encounter Provider: HAILEE Esposito  Encounter Date: 2025   Encounter department: [facility]  :  Assessment & Plan  Gross hematuria  -PVR 35 mls.  -Unable to obtain urine specimen today.  I did order outpatient urine cytology for patient to complete now.  -CT renal protocol in 3-2-25 showing a nonobstructing 6 mm right renal calculus.  No hydronephrosis.  Bilateral simple renal cysts.  Small calcification was within a tiny urachal remnant at the bladder dome.  No convincing associated bladder mass, however, this portion of the bladder is suboptimally opacified.  Further evaluation by cystoscopy recommended.  Fusiform 3.7 cm infrarenal abdominal aortic aneurysm.  -He is asymptomatic of flank pain, dysuria, frequency, urgency. Denies leakage of urine. Denies new episodes of hematuria.  -Hx of smoking marijuana, since age 18.  -Denies family hx of  malignancy.  - Patient will be scheduled for cystoscopy within the next 2 months with physician. We discussed that a CT scan does not show any definitive masses, but given small calcification at bladder dome this should further be evaluated to rule out malignancy.  -All questions addressed. Please do not hesitate to reach out with further questions or concerns.   Orders:    Cytology, urine; Future        History of Present Illness   Regulo Daly is a 69 y.o. male who presents in consultation for hematuria.  Patient has past medical history history of atrial fibrillation on Eliquis and aspirin, aortic stenosis, history of acute ischemic stroke.    Patient denies previous urologic hx or hx of  malignancy.    Patient presented to the ED on 3-21-25 with complaints of blood in his urine.  His bladder scan was unremarkable.  His urinalysis was positive for 30-50 red blood cells per high-powered field.  Prior to this, patient had microscopic hematuria noted on 24 with 2-4 red  blood cells per high-power field.    Patient underwent a CT renal protocol in 3-2-25 showing a nonobstructing 6 mm right renal calculus.  No hydronephrosis.  Bilateral simple renal cysts.  Small calcification was within a tiny urachal remnant at the bladder dome.  No convincing associated bladder mass, however, this portion of the bladder is suboptimally opacified.  Further evaluation by cystoscopy recommended.  Fusiform 3.7 cm infrarenal abdominal aortic aneurysm.    Creatinine 0.67, GFR 98 (3/21/25).    Patient states he does not have known hx of kidney stones. Denies hx of recurrent UTIs.     He states he does drink plenty of water. Denies drinking dark-colored sodas or tea on a daily basis.    He states for about 6 month felt like it was more difficult to urinate. He states he felt like something was stuck inside urethra when urinating. He states he felt relief one he urinated a blood clot.     Was off eliquis for 2 days and has gone back on- and states has had no issues since with seeing blood in urine. He also states that he doesn't feel blockage anymore like he felt for 6 months.    Does endorse a slower stream. Nocturia 2-3x per night, depending on fluid intake. Content with urinary symptoms. PVR 35 mls today.       Denies family hx of  malignancy.    Currently smokes marijuana, since age 18.      Review of Systems   Constitutional:  Negative for activity change, chills, fatigue and fever.   HENT:  Negative for congestion, rhinorrhea and sore throat.    Eyes:  Negative for photophobia, redness and visual disturbance.   Respiratory:  Negative for cough, shortness of breath and wheezing.    Cardiovascular:  Negative for chest pain, palpitations and leg swelling.   Gastrointestinal:  Negative for abdominal pain, diarrhea, nausea and vomiting.   Genitourinary:  Positive for hematuria. Negative for difficulty urinating, dysuria, flank pain, frequency and urgency.        Occasional slower stream  Nocturia 2-3x  "per night   Neurological:  Negative for weakness, light-headedness and headaches.          Objective   /80 (BP Location: Left arm, Patient Position: Sitting, Cuff Size: Large)   Pulse 61   Ht 6' (1.829 m)   Wt 106 kg (233 lb) Comment: patient reported  SpO2 98%   BMI 31.60 kg/m²     Physical Exam  Vitals and nursing note reviewed.   Constitutional:       General: He is not in acute distress.     Appearance: He is well-developed.   HENT:      Head: Normocephalic and atraumatic.   Eyes:      Conjunctiva/sclera: Conjunctivae normal.   Pulmonary:      Effort: Pulmonary effort is normal. No respiratory distress.   Musculoskeletal:         General: No swelling.      Cervical back: Neck supple.   Skin:     General: Skin is warm and dry.      Capillary Refill: Capillary refill takes less than 2 seconds.   Neurological:      Mental Status: He is alert.   Psychiatric:         Mood and Affect: Mood normal.          Results   No results found for: \"PSA\"  Lab Results   Component Value Date    GLUCOSE 104 12/26/2015    CALCIUM 9.1 03/21/2025     12/26/2015    K 3.6 03/21/2025    CO2 22 03/21/2025     03/21/2025    BUN 16 03/21/2025    CREATININE 0.67 03/21/2025     Lab Results   Component Value Date    WBC 6.56 03/21/2025    HGB 14.8 03/21/2025    HCT 44.3 03/21/2025    MCV 87 03/21/2025     (L) 03/21/2025       Office Urine Dip  No results found for this or any previous visit (from the past hour).      "

## 2025-04-07 NOTE — TELEPHONE ENCOUNTER
PT wants a later appt in the day.  Can only come between 930 and 1.     PT cancelled appt and will call back to reschedule if he decides to.     PT was unhappy with how todays appt went as he thought he was getting the cysto done today.  And it was not explained to him when he made the appt that it would not be done.

## 2025-04-07 NOTE — TELEPHONE ENCOUNTER
Cysto scheduled for 6/5/25 with Dr. Lucas in the [place] office. Please confirm with patient.

## 2025-04-17 ENCOUNTER — TELEPHONE (OUTPATIENT)
Age: 70
End: 2025-04-17

## 2025-04-17 NOTE — TELEPHONE ENCOUNTER
Caller: Regulo Daly    Doctor: Dr. Desai    Reason for call: Pt wants to let Dr. Desai know that everything seems fine, no more blood in urine. Any questions    Call back#: [phone]

## 2025-04-30 LAB
CYTOLOGIST CVX/VAG CYTO: NORMAL
DX ICD CODE: NORMAL
PATH REPORT.FINAL DX SPEC: NORMAL
PATH REPORT.GROSS SPEC: NORMAL
PATH REPORT.SITE OF ORIGIN SPEC: NORMAL
PATHOLOGIST NAME: NORMAL

## 2025-05-14 ENCOUNTER — OFFICE VISIT (OUTPATIENT)
Age: 70
End: 2025-05-14
Payer: MEDICARE

## 2025-05-14 VITALS
HEIGHT: 72 IN | SYSTOLIC BLOOD PRESSURE: 130 MMHG | DIASTOLIC BLOOD PRESSURE: 70 MMHG | WEIGHT: 233 LBS | BODY MASS INDEX: 31.56 KG/M2 | HEART RATE: 80 BPM

## 2025-05-14 DIAGNOSIS — E78.5 HLD (HYPERLIPIDEMIA): ICD-10-CM

## 2025-05-14 DIAGNOSIS — G43.109 MIGRAINE WITH AURA AND WITHOUT STATUS MIGRAINOSUS, NOT INTRACTABLE: ICD-10-CM

## 2025-05-14 DIAGNOSIS — Z86.73 CHRONIC ISCHEMIC RIGHT MCA STROKE: Primary | ICD-10-CM

## 2025-05-14 DIAGNOSIS — I48.91 ATRIAL FIBRILLATION (HCC): ICD-10-CM

## 2025-05-14 DIAGNOSIS — H54.61 VISION LOSS OF RIGHT EYE: ICD-10-CM

## 2025-05-14 DIAGNOSIS — Z87.898 HISTORY OF HEADACHE: ICD-10-CM

## 2025-05-14 DIAGNOSIS — G31.84 MILD COGNITIVE IMPAIRMENT: ICD-10-CM

## 2025-05-14 PROCEDURE — 99205 OFFICE O/P NEW HI 60 MIN: CPT | Performed by: PSYCHIATRY & NEUROLOGY

## 2025-05-14 RX ORDER — ATORVASTATIN CALCIUM 20 MG/1
20 TABLET, FILM COATED ORAL DAILY
Qty: 90 TABLET | Refills: 2 | Status: SHIPPED | OUTPATIENT
Start: 2025-05-14

## 2025-05-14 RX ORDER — VERAPAMIL HYDROCHLORIDE 120 MG/1
120 CAPSULE, EXTENDED RELEASE ORAL
Qty: 90 CAPSULE | Refills: 2 | Status: SHIPPED | OUTPATIENT
Start: 2025-05-14

## 2025-05-14 NOTE — PROGRESS NOTES
Outpatient Neurology History and Physical  Regulo Daly  [id number]  69 y.o.  1955          Consult: Yes    Светлана Thomas MD      Chief Complaint   Patient presents with   • Headache   • Stroke   • Eye Problem     Peripheral vision loss           History Obtained from: patient and wife     HPI:     Regulo Daly is a 70 yo M with PMH of right mca stroke presents as f/u. He was last seen by me in 2021. He lost to follow up. Per my previous history, patient was admitted in early 2021 for dysarthria.  He was found to have acute right MCA ischemic stroke and active A fib. He was supposed to be on AC however for some reason it was d/c. Patient was also found to have right M2/M3 occlusion. He was started on eliquis, lipitor 80mg and aspirin 81mg for the occlusion.   Patient's wife says that he can get loss of train of thought at times and loses his words. He also has loss of peripheral vision in right eye. He was getting a lot of headaches after his stroke so we had placed him on verapamil which is working well. We had asked him to return as we couldn't keep refilling without seeing him for so long.   He has no complaints at present.   He is on SS disability at present. He was a .   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     He had repeat CTA head and neck and was negative.   We had added topamax but he gets side effects and it hasn't helped.      He's overall independent. He can't walk long distance.          Past Medical History:   Diagnosis Date   • Atrial fibrillation (HCC)    • Hypertension    • Paroxysmal atrial fibrillation (HCC)    • Stroke (HCC)    • Valvular heart disease                Medications Ordered Prior to Encounter[1]    No Known Allergies      Family History   Problem Relation Age of Onset   • No Known Problems Mother    • Stroke Father    • Heart failure Brother         heart problems   • Brain cancer Brother                           Past Surgical History:   Procedure Laterality Date   • HERNIA REPAIR     • KNEE SURGERY             Social History     Socioeconomic History   • Marital status: /Civil Union     Spouse name: Not on file   • Number of children: Not on file   • Years of education: Not on file   • Highest education level: Not on file   Occupational History   • Not on file   Tobacco Use   • Smoking status: Never   • Smokeless tobacco: Never   Vaping Use   • Vaping status: Every Day   • Substances: THC   Substance and Sexual Activity   • Alcohol use: Never     Alcohol/week: 6.0 standard drinks of alcohol     Types: 6 Cans of beer per week   • Drug use: Yes     Types: Marijuana     Comment: vape   • Sexual activity: Not Currently     Partners: Female     Birth control/protection: None     Comment: not asked   Other Topics Concern   • Not on file   Social History Narrative   • Not on file     Social Drivers of Health     Financial Resource Strain: Not on file   Food Insecurity: Not on file   Transportation Needs: Not on file   Physical Activity: Not on file   Stress: Not on file   Social Connections: Not on file   Intimate Partner Violence: Not on file   Housing Stability: Not on file       Review of Systems  Refer to positive review of systems in HPI  Constitutional- No fever  Eyes- No visual change  ENT- Hearing normal  CV- No chest pain  Resp- No Shortness of breath  GI- No diarrhea  - Bladder normal  MS- No Arthritis   Skin- No rash  Psych- No depression  Endo- No DM  Heme- No nodes    PHYSICAL EXAM:    Vitals:    05/14/25 1012   BP: 130/70   BP Location: Left arm   Patient Position: Sitting   Cuff Size: Large   Pulse: 80   Weight: 106 kg (233 lb)   Height: 6' (1.829 m)         Appearance: No Acute Distress  Ophthalmoscopic: Disc Flat, Normal fundus  Carotid/Heart/Peripheral Vascular: No Bruits, RRR  Orientation: Awake, Alert, and Oriented x 3  Mental status:  Memory: Registation 3/3 Recall 1/3  Attention:  Normal  Knowledge: Appropriate  Language: No aphasia  Speech: No dysarthria  Cranial Nerves:  2  right hemifield visual loss in right eye. Intact in left eye. ; Pupils round, equal, reactive to light  3,4,6 Extraocular Movements Intact; no nystagmus  5 Facial Sensation Intact  7 No facial asymmetry  8 Intact hearing  9,10 Palate symmetric, normal gag  11 Good shoulder shrug  12 Tongue Midline  Gait: Stable, No ataxia, can perform tandem walking  Coordination: No ataxia with finger to nose testing and heel to shin testing  Sensory: Intact, Symmetric to Pinprick, Light Touch, Vibration, and Joint Position  Muscle Tone: Normal  Muscle exam  Arm Right Left Leg Right Left   Deltoid 5/5 5/5 Iliopsoas 5/5 5/5   Biceps 5/5 5/5 Quads 5/5 5/5   Triceps 5/5 5/5 Hamstrings 5/5 5/5   Wrist Extension 5/5 5/5 Ankle Dorsi Flexion 5/5 5/5   Wrist Flexion 5/5 5/5 Ankle Plantar Flexion 5/5 5/5   Interossei 5/5 5/5 Ankle Eversion 5/5 5/5   APB 5/5 5/5 Ankle Inversion 5/5 5/5       Reflexes   RJ BJ TJ KJ AJ Plantars Carrasco's   Right 2+ 2+ 2+ 2+  Downgoing Not present   Left 2+ 2+ 2+ 2+  Downgoing Not present         Personal review of        MRI brain from 2021    Assessment/Plan:     Assessment & Plan  Chronic ischemic right MCA stroke  Stable. No additional ischemic event.   He is to resume aspirin 81 and eliquis 5mg bid.   Lipitor being reduced to 20mg daily as his LDL is in range of 40's.   Strongly encouraged joining gym and exercising.   Filled out form for jury duty why he can't serve.        HLD (hyperlipidemia)    Orders:  •  atorvastatin (LIPITOR) 20 mg tablet; Take 1 tablet (20 mg total) by mouth daily    Atrial fibrillation (HCC)  Resume eliquis 5mg bid.        History of headache  Well controlled on verapamil. Will resume 120mg daily.        Migraine with aura and without status migrainosus, not intractable    Orders:  •  verapamil (Verelan) 120 MG 24 hr capsule; Take 1 capsule (120 mg total) by mouth daily at  bedtime    Vision loss of right eye    Orders:  •  MRI brain NeuroQuant wo and w contrast; Future    Mild cognitive impairment    Orders:  •  MRI brain NeuroQuant wo and w contrast; Future    He has mild cognitive impairment since the stroke.                   Counseling Documentation:  The patient and/or patient's family were  counseled regarding diagnostic results. Instructions for management,risk factor reductions,prognosis of disease were discussed. Patient and family were educated regarding impressions,risks and benefits of treatment options,importance of compliance with treatment.        Total time of encounter: 60 min  More than 50% of time was spent in counseling and coordination of care of patient.     Jin Alberts M.D.  [facility]  [address] [place] [id number]           [1]  Current Outpatient Medications on File Prior to Visit   Medication Sig Dispense Refill   • apixaban (Eliquis) 5 mg Take 1 tablet (5 mg total) by mouth 2 (two) times a day 180 tablet 3   • aspirin 81 mg chewable tablet Chew 1 tablet (81 mg total) daily 30 tablet 0   • loratadine (CLARITIN) 10 mg tablet Take 10 mg by mouth in the morning.     • metoprolol tartrate (LOPRESSOR) 25 mg tablet TAKE 1 TABLET (25 MG TOTAL) BY MOUTH EVERY 12 (TWELVE) HOURS 180 tablet 3   • [DISCONTINUED] atorvastatin (LIPITOR) 40 mg tablet TAKE 1 TABLET BY MOUTH EVERY DAY WITH DINNER 90 tablet 1   • [DISCONTINUED] verapamil (Verelan) 120 MG 24 hr capsule TAKE 1 CAPSULE (120 MG TOTAL) BY MOUTH DAILY AT BEDTIME 90 capsule 1   • apixaban (Eliquis) 5 mg Take 1 tablet (5 mg total) by mouth 2 (two) times a day for 28 days 56 tablet 0   • [DISCONTINUED] topiramate (TOPAMAX) 50 MG tablet Take 1 tablet (50 mg total) by mouth daily at bedtime 30 tablet 1     No current facility-administered medications on file prior to visit.

## 2025-05-21 ENCOUNTER — TELEPHONE (OUTPATIENT)
Dept: CARDIOLOGY CLINIC | Facility: CLINIC | Age: 70
End: 2025-05-21

## 2025-05-21 NOTE — TELEPHONE ENCOUNTER
Patient had called with regards to questions.     Patient is going in for a scope tomorrow.   Last time he passed a stone and bled for 12 hours.   Does he need to hold eliquis?      Atorvastatin lowered to 20mg.

## 2025-05-21 NOTE — TELEPHONE ENCOUNTER
What kind of scope.  He is not cystoscope is not endoscope inside stone removal.  If it is a stone removal I am sure they will like to hold Eliquis for 2 days.   Abdomen soft, non-tender, no guarding.

## 2025-05-22 ENCOUNTER — PROCEDURE VISIT (OUTPATIENT)
Dept: UROLOGY | Facility: CLINIC | Age: 70
End: 2025-05-22
Payer: MEDICARE

## 2025-05-22 VITALS
WEIGHT: 230 LBS | HEIGHT: 72 IN | OXYGEN SATURATION: 96 % | SYSTOLIC BLOOD PRESSURE: 138 MMHG | BODY MASS INDEX: 31.15 KG/M2 | DIASTOLIC BLOOD PRESSURE: 76 MMHG | HEART RATE: 66 BPM

## 2025-05-22 DIAGNOSIS — I63.9 ACUTE ISCHEMIC STROKE (HCC): ICD-10-CM

## 2025-05-22 DIAGNOSIS — Q64.4 URACHAL ANOMALY: ICD-10-CM

## 2025-05-22 DIAGNOSIS — R31.0 GROSS HEMATURIA: Primary | ICD-10-CM

## 2025-05-22 PROCEDURE — 52000 CYSTOURETHROSCOPY: CPT | Performed by: UROLOGY

## 2025-05-22 NOTE — PATIENT INSTRUCTIONS
After the look in the bladder, it is not uncommon to have some mild urinary bleeding or discomfort with the first couple of urinations.  We try to avoid taking oral antibiotic.  To that end we recommend urinating before leaving the office, increasing fluids over the next few hours, and advise to call if the bleeding and discomfort continue after 24 hours.  Also you are advised to call if you have any trouble emptying the bladder.  Rarely people get infections up in the kidneys, as evidenced by pain up underneath the rib cage, and this would require our attention also.

## 2025-05-22 NOTE — PROGRESS NOTES
Cystoscopy     Date/Time  5/22/2025 10:30 AM     Performed by  David Lucas MD   Authorized by  David Lucas MD       Universal Protocol:  Consent: Written consent obtained  Risks and benefits: risks, benefits and alternatives were discussed  Consent given by: patient  Patient understanding: patient states understanding of the procedure being performed  Patient consent: the patient's understanding of the procedure matches consent given  Procedure consent: procedure consent matches procedure scheduled  Patient identity confirmed: verbally with patient      Procedure Details:  Procedure type: cystoscopy    Patient tolerance: Patient tolerated the procedure well with no immediate complications    Additional Procedure Details: Indication:  69-year-old gentleman with gross hematuria on Eliquis.  Thinks he passed a clot of her stone back in early March.  CT imaging shows calcification in what looks like the urachus at the dome of the bladder.  No renal lesions.    Findings:  Sterilely prepped and draped.  Numbed with lidocaine jelly.  17 Mexican cystoscope advanced per urethra.  No urethral strictures.  Prostate nonobstructive.  Bladder without trabeculation.  Single small diverticulum 1 cm in diameter with a little bullous edema at the base.  No stones.  Nothing that looked like luana adenocarcinoma of the urachus.  Visualized with patient and explained what we were seeing.  Retroflexed scope.  Ureters look normal.    Impression and plan:  Will repeat CT imaging at 6 months and cystoscopy thereafter.  Patient will call if he develops gross hematuria in the interim and if that is the case we may biopsy the edematous area at the dome.  Patient has a history of stroke so I would prefer not to stop his blood thinners to do a biopsy unless it is absolutely necessary.  He is nontender at the area of the dome.

## 2025-08-05 ENCOUNTER — OFFICE VISIT (OUTPATIENT)
Dept: CARDIOLOGY CLINIC | Facility: CLINIC | Age: 70
End: 2025-08-05
Payer: MEDICARE

## 2025-08-05 VITALS
OXYGEN SATURATION: 99 % | HEART RATE: 69 BPM | WEIGHT: 227 LBS | SYSTOLIC BLOOD PRESSURE: 145 MMHG | BODY MASS INDEX: 30.75 KG/M2 | HEIGHT: 72 IN | DIASTOLIC BLOOD PRESSURE: 92 MMHG

## 2025-08-05 DIAGNOSIS — I35.0 AORTIC STENOSIS, MODERATE: ICD-10-CM

## 2025-08-05 DIAGNOSIS — I48.21 PERMANENT ATRIAL FIBRILLATION (HCC): ICD-10-CM

## 2025-08-05 DIAGNOSIS — Z86.73 HISTORY OF STROKE: ICD-10-CM

## 2025-08-05 DIAGNOSIS — E78.5 DYSLIPIDEMIA: ICD-10-CM

## 2025-08-05 DIAGNOSIS — E66.9 OBESITY (BMI 30-39.9): ICD-10-CM

## 2025-08-05 DIAGNOSIS — I71.21 ANEURYSM OF ASCENDING AORTA WITHOUT RUPTURE (HCC): ICD-10-CM

## 2025-08-05 DIAGNOSIS — I11.9 HYPERTENSIVE HEART DISEASE WITHOUT HEART FAILURE: ICD-10-CM

## 2025-08-05 PROCEDURE — 93000 ELECTROCARDIOGRAM COMPLETE: CPT | Performed by: INTERNAL MEDICINE

## 2025-08-05 PROCEDURE — 99214 OFFICE O/P EST MOD 30 MIN: CPT | Performed by: INTERNAL MEDICINE

## 2025-08-05 RX ORDER — LOSARTAN POTASSIUM 50 MG/1
50 TABLET ORAL DAILY
Qty: 90 TABLET | Refills: 1 | Status: SHIPPED | OUTPATIENT
Start: 2025-08-05

## 2025-08-13 ENCOUNTER — RESULTS FOLLOW-UP (OUTPATIENT)
Dept: CARDIOLOGY CLINIC | Facility: CLINIC | Age: 70
End: 2025-08-13